# Patient Record
Sex: FEMALE | Race: WHITE | NOT HISPANIC OR LATINO | Employment: FULL TIME | URBAN - METROPOLITAN AREA
[De-identification: names, ages, dates, MRNs, and addresses within clinical notes are randomized per-mention and may not be internally consistent; named-entity substitution may affect disease eponyms.]

---

## 2017-01-30 RX ORDER — NORTRIPTYLINE HYDROCHLORIDE 10 MG/1
CAPSULE ORAL
Qty: 60 CAPSULE | Refills: 0 | Status: SHIPPED | OUTPATIENT
Start: 2017-01-30 | End: 2017-03-28 | Stop reason: SDUPTHER

## 2017-02-01 RX ORDER — CHLORTHALIDONE 25 MG/1
TABLET ORAL
Qty: 15 TABLET | Refills: 5 | Status: SHIPPED | OUTPATIENT
Start: 2017-02-01 | End: 2018-01-30 | Stop reason: SDUPTHER

## 2017-03-28 RX ORDER — NORTRIPTYLINE HYDROCHLORIDE 10 MG/1
CAPSULE ORAL
Qty: 60 CAPSULE | Refills: 0 | Status: SHIPPED | OUTPATIENT
Start: 2017-03-28 | End: 2017-05-30 | Stop reason: SDUPTHER

## 2017-05-30 RX ORDER — NORTRIPTYLINE HYDROCHLORIDE 10 MG/1
CAPSULE ORAL
Qty: 60 CAPSULE | Refills: 0 | Status: SHIPPED | OUTPATIENT
Start: 2017-05-30 | End: 2017-07-25 | Stop reason: SDUPTHER

## 2017-07-25 RX ORDER — NORTRIPTYLINE HYDROCHLORIDE 10 MG/1
CAPSULE ORAL
Qty: 60 CAPSULE | Refills: 0 | Status: SHIPPED | OUTPATIENT
Start: 2017-07-25 | End: 2017-09-12 | Stop reason: SDUPTHER

## 2017-09-12 RX ORDER — NORTRIPTYLINE HYDROCHLORIDE 10 MG/1
CAPSULE ORAL
Qty: 60 CAPSULE | Refills: 0 | Status: SHIPPED | OUTPATIENT
Start: 2017-09-12 | End: 2017-11-16 | Stop reason: SDUPTHER

## 2017-11-16 RX ORDER — NORTRIPTYLINE HYDROCHLORIDE 10 MG/1
CAPSULE ORAL
Qty: 60 CAPSULE | Refills: 0 | Status: SHIPPED | OUTPATIENT
Start: 2017-11-16 | End: 2018-01-04 | Stop reason: SDUPTHER

## 2017-11-28 ENCOUNTER — OFFICE VISIT (OUTPATIENT)
Dept: CARDIOLOGY | Facility: CLINIC | Age: 57
End: 2017-11-28

## 2017-11-28 VITALS
HEART RATE: 97 BPM | DIASTOLIC BLOOD PRESSURE: 78 MMHG | BODY MASS INDEX: 33.36 KG/M2 | SYSTOLIC BLOOD PRESSURE: 118 MMHG | WEIGHT: 200.2 LBS | HEIGHT: 65 IN

## 2017-11-28 DIAGNOSIS — E78.00 HYPERCHOLESTEROLEMIA: ICD-10-CM

## 2017-11-28 DIAGNOSIS — I10 ESSENTIAL HYPERTENSION: ICD-10-CM

## 2017-11-28 DIAGNOSIS — I25.10 CORONARY ARTERY DISEASE INVOLVING NATIVE CORONARY ARTERY OF NATIVE HEART WITHOUT ANGINA PECTORIS: Primary | ICD-10-CM

## 2017-11-28 PROCEDURE — 99214 OFFICE O/P EST MOD 30 MIN: CPT | Performed by: INTERNAL MEDICINE

## 2017-11-28 NOTE — PROGRESS NOTES
[facility] at [facility]  Office visit  Bri Boyce  1960  [phone]    VISIT DATE:  11/28/2017    PCP: Clarke Barragan MD  [address] [place] [address] [address] [address]    CC:  Chief Complaint   Patient presents with   • Coronary Artery Disease   • Hypertension       PROBLEM LIST:  1. Non-obstructive CAD  a. Left  heart catheterization, October 2011, 40% blockage off the circumflex  and 30% blockage of the LAD.  40% blockage of the RCA.  1. Echocardiogram, 04/09/2015: EF 55% to 60% with no regional wall motion abnormalities.  2. Exercise Cardiolite, 04/09/2015: Normal exercise Cardiolite with decreased exercise capacity.  2. Hypertension  3.  Hypercholesterolemia  4. Chest pain  5. Abnormal heart rhythm  6. Arthritis  7. Past surgeries:   a.  Cyst removed from ovary.  b. Cyst removed from right breast.  c. Tubal ligation  ASSESSMENT:   Diagnosis Plan   1. Coronary artery disease involving native coronary artery of native heart without angina pectoris     2. Essential hypertension     3. Hypercholesterolemia         PLAN:  Coronary artery disease, nonobstructive: Continue aspirin, statin and afterload reduction. encouraged regular exercise.    Hyperlipidemia: Goal LDL less than 100.  Continue atorvastatin 80 mg by mouth daily.  Have requested most recent fasting lipid panel.  Recommended biannual lipid panel and CMP.    Hypertension: Goal less than 130/80 mmHg.  Currently well-controlled.  Continue current medical therapy, ACE inhibitor and thiazide diuretic.    Subjective  Reports stable functional capacity.  Maintaining an active lifestyle but not exercising on a regular basis.  Blood pressures running less than 120/80 mmHg.  Denies myalgias on statin.  She is compliant with medical therapy.  She stopped smoking 7 years ago at the time of her heart catheterization.  Denies chest pain, palpitations or dyspnea.    PHYSICAL EXAMINATION:  Vitals:    11/28/17 1534   BP: 118/78   BP Location: Left arm  "  Patient Position: Sitting   Pulse: 97   Weight: 200 lb 3.2 oz (90.8 kg)   Height: 65\" (165.1 cm)     General Appearance:    Alert, cooperative, no distress, appears stated age   Head:    Normocephalic, without obvious abnormality, atraumatic   Eyes:    conjunctiva/corneas clear   Nose:   Nares normal, septum midline, mucosa normal, no drainage   Throat:   Lips, teeth and gums normal   Neck:   Supple, symmetrical, trachea midline, no carotid    bruit or JVD   Lungs:     Clear to auscultation bilaterally, respirations unlabored   Chest Wall:    No tenderness or deformity    Heart:    Regular rate and rhythm, S1 and S2 normal, no murmur, rub   or gallop, normal carotid impulse bilaterally without bruit.   Abdomen:     Soft, non-tender   Extremities:   Extremities normal, atraumatic, no cyanosis or edema   Pulses:   2+ and symmetric all extremities   Skin:   Skin color, texture, turgor normal, no rashes or lesions       Diagnostic Data:  Procedures  No results found for: CHLPL, TRIG, HDL, LDLDIRECT  No results found for: GLUCOSE, BUN, CREATININE, NA, K, CL, CO2, CREATININE, BUN  No results found for: HGBA1C  No results found for: WBC, HGB, HCT, PLT    Allergies  Allergies   Allergen Reactions   • Percocet [Oxycodone-Acetaminophen] Nausea And Vomiting       Current Medications    Current Outpatient Prescriptions:   •  aspirin 81 MG tablet, Take  by mouth Daily., Disp: , Rfl:   •  atorvastatin (LIPITOR) 80 MG tablet, Take 80 mg by mouth Daily., Disp: , Rfl:   •  chlorthalidone (HYGROTON) 25 MG tablet, take 1/2 tablet by mouth once daily, Disp: 15 tablet, Rfl: 5  •  Diphenhydramine-APAP, sleep, (TYLENOL PM EXTRA STRENGTH PO), Take  by mouth As Needed., Disp: , Rfl:   •  estradiol (ESTRACE) 0.5 MG tablet, Take  by mouth Daily., Disp: , Rfl:   •  lisinopril (PRINIVIL,ZESTRIL) 10 MG tablet, Take  by mouth Daily., Disp: , Rfl:   •  medroxyPROGESTERone (PROVERA) 2.5 MG tablet, Take  by mouth Daily., Disp: , Rfl:   •  " nortriptyline (PAMELOR) 10 MG capsule, take 1 to 2 capsules by mouth at bedtime, Disp: 60 capsule, Rfl: 0          ROS  Review of Systems   Cardiovascular: Negative for chest pain, claudication, dyspnea on exertion, irregular heartbeat, palpitations and syncope.   Respiratory: Negative for cough and wheezing.          SOCIAL HX  Social History     Social History   • Marital status:      Spouse name: N/A   • Number of children: N/A   • Years of education: N/A     Occupational History   • Not on file.     Social History Main Topics   • Smoking status: Former Smoker   • Smokeless tobacco: Never Used      Comment: QUIT 4 YEARS AGO   • Alcohol use No   • Drug use: No   • Sexual activity: Defer     Other Topics Concern   • Not on file     Social History Narrative       FAMILY HX  Family History   Problem Relation Age of Onset   • Other Other      BLOOD CLOTS   • Cancer Other    • Heart disease Other    • Diabetes Other    • Arthritis Other    • Stroke Mother    • Heart disease Father    • No Known Problems Sister    • No Known Problems Brother              Sven Escamilla III, MD, FACC

## 2018-01-04 RX ORDER — NORTRIPTYLINE HYDROCHLORIDE 10 MG/1
CAPSULE ORAL
Qty: 60 CAPSULE | Refills: 0 | Status: SHIPPED | OUTPATIENT
Start: 2018-01-04 | End: 2018-03-09 | Stop reason: SDUPTHER

## 2018-01-30 RX ORDER — CHLORTHALIDONE 25 MG/1
TABLET ORAL
Qty: 15 TABLET | Refills: 11 | Status: SHIPPED | OUTPATIENT
Start: 2018-01-30 | End: 2018-06-26 | Stop reason: SDUPTHER

## 2018-03-09 RX ORDER — NORTRIPTYLINE HYDROCHLORIDE 10 MG/1
CAPSULE ORAL
Qty: 60 CAPSULE | Refills: 5 | Status: SHIPPED | OUTPATIENT
Start: 2018-03-09 | End: 2019-07-23

## 2018-06-26 ENCOUNTER — OFFICE VISIT (OUTPATIENT)
Dept: CARDIOLOGY | Facility: CLINIC | Age: 58
End: 2018-06-26

## 2018-06-26 VITALS
HEART RATE: 97 BPM | DIASTOLIC BLOOD PRESSURE: 74 MMHG | HEIGHT: 65 IN | BODY MASS INDEX: 33.15 KG/M2 | SYSTOLIC BLOOD PRESSURE: 116 MMHG | WEIGHT: 199 LBS | OXYGEN SATURATION: 96 %

## 2018-06-26 DIAGNOSIS — I25.10 CORONARY ARTERY DISEASE INVOLVING NATIVE CORONARY ARTERY OF NATIVE HEART WITHOUT ANGINA PECTORIS: Primary | ICD-10-CM

## 2018-06-26 DIAGNOSIS — E78.00 HYPERCHOLESTEROLEMIA: ICD-10-CM

## 2018-06-26 DIAGNOSIS — I10 ESSENTIAL HYPERTENSION: ICD-10-CM

## 2018-06-26 PROCEDURE — 99214 OFFICE O/P EST MOD 30 MIN: CPT | Performed by: INTERNAL MEDICINE

## 2018-06-26 RX ORDER — CHLORTHALIDONE 25 MG/1
12.5 TABLET ORAL DAILY
Qty: 45 TABLET | Refills: 3 | Status: SHIPPED | OUTPATIENT
Start: 2018-06-26 | End: 2019-07-23 | Stop reason: SDUPTHER

## 2018-06-26 NOTE — PROGRESS NOTES
[facility] at [facility]  Office visit  Bri Boyce  1960  [phone]    VISIT DATE:  11/28/2017    PCP: Clarke Barragan MD  [address] [place] [address] [address] [place] [address]    CC:  Chief Complaint   Patient presents with   • Coronary Artery Disease   • Dizziness       PROBLEM LIST:  1. Non-obstructive CAD  a. Left  heart catheterization, October 2011, 40% blockage off the circumflex  and 30% blockage of the LAD.  40% blockage of the RCA.  1. Echocardiogram, 04/09/2015: EF 55% to 60% with no regional wall motion abnormalities.  2. Exercise Cardiolite, 04/09/2015: Normal exercise Cardiolite with decreased exercise capacity.  2. Hypertension  3.  Hypercholesterolemia  4. Chest pain  5. Abnormal heart rhythm  6. Arthritis  7. Past surgeries:   a.  Cyst removed from ovary.  b. Cyst removed from right breast.  c. Tubal ligation    ASSESSMENT:   Diagnosis Plan   1. Coronary artery disease involving native coronary artery of native heart without angina pectoris     2. Essential hypertension     3. Hypercholesterolemia         PLAN:  Coronary artery disease, nonobstructive: Continue aspirin, statin and afterload reduction.     Hyperlipidemia: Goal LDL less than 100.  Continue atorvastatin 80 mg by mouth daily. Recommended biannual lipid panel and CMP.Triglyceride levels and LDL are well controlled.  Mildly depressed HDL levels.  Continue regular exercise and dietary modification.    Hypertension: Goal less than 130/80 mmHg.  Currently well-controlled.  Continue current medical therapy, ACE inhibitor and thiazide diuretic.    Subjective  Reports stable functional capacity.  Maintaining an active lifestyle but not exercising on a regular basis.Has been able lose 12 pounds with regular exercise and dietary changes.  Blood pressures running less than 120/80 mmHg.  Denies myalgias on statin.  She is compliant with medical therapy.  She stopped smoking at the time of her heart catheterization.  Denies chest pain,  "palpitations or dyspnea.  Reviewed most recent fasting lipid panel.    PHYSICAL EXAMINATION:  Vitals:    06/26/18 1529   Weight: 90.3 kg (199 lb)   Height: 165.1 cm (65\")     General Appearance:    Alert, cooperative, no distress, appears stated age   Head:    Normocephalic, without obvious abnormality, atraumatic   Eyes:    conjunctiva/corneas clear   Nose:   Nares normal, septum midline, mucosa normal, no drainage   Throat:   Lips, teeth and gums normal   Neck:   Supple, symmetrical, trachea midline, no carotid    bruit or JVD   Lungs:     Clear to auscultation bilaterally, respirations unlabored   Chest Wall:    No tenderness or deformity    Heart:    Regular rate and rhythm, S1 and S2 normal, no murmur, rub   or gallop, normal carotid impulse bilaterally without bruit.   Abdomen:     Soft, non-tender   Extremities:   Extremities normal, atraumatic, no cyanosis or edema   Pulses:   2+ and symmetric all extremities   Skin:   Skin color, texture, turgor normal, no rashes or lesions       Diagnostic Data:  Procedures  No results found for: CHLPL, TRIG, HDL, LDLDIRECT  No results found for: GLUCOSE, BUN, CREATININE, NA, K, CL, CO2, CREATININE, BUN  No results found for: HGBA1C  No results found for: WBC, HGB, HCT, PLT    Allergies  Allergies   Allergen Reactions   • Percocet [Oxycodone-Acetaminophen] Nausea And Vomiting       Current Medications    Current Outpatient Prescriptions:   •  aspirin 81 MG tablet, Take  by mouth Daily., Disp: , Rfl:   •  atorvastatin (LIPITOR) 80 MG tablet, Take 80 mg by mouth Daily., Disp: , Rfl:   •  chlorthalidone (HYGROTON) 25 MG tablet, take 1/2 tablet by mouth once daily, Disp: 15 tablet, Rfl: 11  •  Diphenhydramine-APAP, sleep, (TYLENOL PM EXTRA STRENGTH PO), Take  by mouth As Needed., Disp: , Rfl:   •  estradiol (ESTRACE) 0.5 MG tablet, Take  by mouth Daily. Every other day, Disp: , Rfl:   •  lisinopril (PRINIVIL,ZESTRIL) 10 MG tablet, Take  by mouth Daily., Disp: , Rfl:   •  " medroxyPROGESTERone (PROVERA) 2.5 MG tablet, Take  by mouth Daily. Every other day, Disp: , Rfl:   •  Multiple Vitamin (MULTI-VITAMIN DAILY PO), Take  by mouth Daily. Smarty pants 6 gums, Disp: , Rfl:   •  nortriptyline (PAMELOR) 10 MG capsule, Take 1 to 2 capsules at bedtime, Disp: 60 capsule, Rfl: 5          ROS  Review of Systems   Cardiovascular: Negative for chest pain, claudication, dyspnea on exertion, irregular heartbeat, palpitations and syncope.   Respiratory: Positive for shortness of breath. Negative for cough and wheezing.          SOCIAL HX  Social History     Social History   • Marital status:      Spouse name: N/A   • Number of children: N/A   • Years of education: N/A     Occupational History   • Not on file.     Social History Main Topics   • Smoking status: Former Smoker     Types: Cigarettes     Quit date: 2011   • Smokeless tobacco: Never Used   • Alcohol use No   • Drug use: No   • Sexual activity: Defer     Other Topics Concern   • Not on file     Social History Narrative   • No narrative on file       FAMILY HX  Family History   Problem Relation Age of Onset   • Other Other         BLOOD CLOTS   • Cancer Other    • Heart disease Other    • Diabetes Other    • Arthritis Other    • Stroke Mother    • Heart disease Father    • No Known Problems Sister    • No Known Problems Brother              Sven Escamilla III, MD, [facility]

## 2019-02-25 RX ORDER — NORTRIPTYLINE HYDROCHLORIDE 10 MG/1
CAPSULE ORAL
Qty: 60 CAPSULE | Refills: 5 | OUTPATIENT
Start: 2019-02-25

## 2019-02-28 RX ORDER — NORTRIPTYLINE HYDROCHLORIDE 10 MG/1
CAPSULE ORAL
Qty: 60 CAPSULE | Refills: 5 | OUTPATIENT
Start: 2019-02-28

## 2019-06-26 RX ORDER — CHLORTHALIDONE 25 MG/1
TABLET ORAL
Qty: 15 TABLET | Refills: 1 | Status: SHIPPED | OUTPATIENT
Start: 2019-06-26 | End: 2019-07-23 | Stop reason: SDUPTHER

## 2019-07-23 ENCOUNTER — OFFICE VISIT (OUTPATIENT)
Dept: CARDIOLOGY | Facility: CLINIC | Age: 59
End: 2019-07-23

## 2019-07-23 VITALS
WEIGHT: 210 LBS | SYSTOLIC BLOOD PRESSURE: 136 MMHG | HEART RATE: 86 BPM | HEIGHT: 65 IN | OXYGEN SATURATION: 98 % | BODY MASS INDEX: 34.99 KG/M2 | DIASTOLIC BLOOD PRESSURE: 72 MMHG

## 2019-07-23 DIAGNOSIS — E78.00 HYPERCHOLESTEROLEMIA: ICD-10-CM

## 2019-07-23 DIAGNOSIS — I25.10 CORONARY ARTERY DISEASE INVOLVING NATIVE CORONARY ARTERY OF NATIVE HEART WITHOUT ANGINA PECTORIS: Primary | ICD-10-CM

## 2019-07-23 DIAGNOSIS — I10 ESSENTIAL HYPERTENSION: ICD-10-CM

## 2019-07-23 PROCEDURE — 99213 OFFICE O/P EST LOW 20 MIN: CPT | Performed by: INTERNAL MEDICINE

## 2019-07-23 RX ORDER — CHLORTHALIDONE 25 MG/1
12.5 TABLET ORAL DAILY
Qty: 45 TABLET | Refills: 3 | Status: SHIPPED | OUTPATIENT
Start: 2019-07-23 | End: 2020-03-11 | Stop reason: SDUPTHER

## 2019-07-23 NOTE — PROGRESS NOTES
[facility] at [facility]  Office visit  Bri Boyce  1960  [phone]    VISIT DATE:  7/23/2019      PCP: Clarke Barragan MD  [address] [place] [address] [address] [place] [address]    CC:  Chief Complaint   Patient presents with   • Coronary Artery Disease       PROBLEM LIST:  1. Non-obstructive CAD  a. Left  heart catheterization, October 2011, 40% blockage off the circumflex  and 30% blockage of the LAD.  40% blockage of the RCA.  1. Echocardiogram, 04/09/2015: EF 55% to 60% with no regional wall motion abnormalities.  2. Exercise Cardiolite, 04/09/2015: Normal exercise Cardiolite with decreased exercise capacity.  2. Hypertension  3.  Hypercholesterolemia  4. Chest pain  5. Abnormal heart rhythm  6. Arthritis  7. Past surgeries:   a.  Cyst removed from ovary.  b. Cyst removed from right breast.  c. Tubal ligation    ASSESSMENT:   Diagnosis Plan   1. Coronary artery disease involving native coronary artery of native heart without angina pectoris     2. Essential hypertension     3. Hypercholesterolemia         PLAN:  Coronary artery disease, nonobstructive: Continue aspirin, statin and afterload reduction.     Hyperlipidemia: Goal LDL less than 100.  Continue atorvastatin 80 mg by mouth daily. annual lipid panel and CMP.  LDL and HDL at goal.  Mild increase in triglycerides over the past year associated with about a 10 pound weight gain.  Continue regular exercise and dietary modification.    Hypertension: Goal less than 130/80 mmHg.  Currently well-controlled.  Continue current medical therapy, ACE inhibitor and thiazide diuretic.    Subjective  Reports stable functional capacity.  Maintaining an active lifestyle but not exercising on a regular basis.is gained about 10 pounds over the previous year. Denies myalgias on statin.  She is compliant with medical therapy.  She stopped smoking at the time of her heart catheterization.  Denies chest pain, palpitations or dyspnea.  Reviewed most recent fasting  "lipid panel.    PHYSICAL EXAMINATION:  Vitals:    07/23/19 1510   Weight: 95.3 kg (210 lb)   Height: 165.1 cm (65\")     General Appearance:    Alert, cooperative, no distress, appears stated age   Head:    Normocephalic, without obvious abnormality, atraumatic   Eyes:    conjunctiva/corneas clear   Nose:   Nares normal, septum midline, mucosa normal, no drainage   Throat:   Lips, teeth and gums normal   Neck:   Supple, symmetrical, trachea midline, no carotid    bruit or JVD   Lungs:     Clear to auscultation bilaterally, respirations unlabored   Chest Wall:    No tenderness or deformity    Heart:    Regular rate and rhythm, S1 and S2 normal, no murmur, rub   or gallop, normal carotid impulse bilaterally without bruit.   Abdomen:     Soft, non-tender   Extremities:   Extremities normal, atraumatic, no cyanosis or edema   Pulses:   2+ and symmetric all extremities   Skin:   Skin color, texture, turgor normal, no rashes or lesions       Diagnostic Data:  Procedures  No results found for: CHLPL, TRIG, HDL, LDLDIRECT  No results found for: GLUCOSE, BUN, CREATININE, NA, K, CL, CO2, CREATININE, BUN  No results found for: HGBA1C  No results found for: WBC, HGB, HCT, PLT    Allergies  Allergies   Allergen Reactions   • Percocet [Oxycodone-Acetaminophen] Nausea And Vomiting       Current Medications    Current Outpatient Medications:   •  aspirin 81 MG tablet, Take  by mouth Daily., Disp: , Rfl:   •  atorvastatin (LIPITOR) 80 MG tablet, Take 80 mg by mouth Daily., Disp: , Rfl:   •  chlorthalidone (HYGROTON) 25 MG tablet, Take 0.5 tablets by mouth Daily., Disp: 45 tablet, Rfl: 3  •  Diphenhydramine-APAP, sleep, (TYLENOL PM EXTRA STRENGTH PO), Take  by mouth As Needed., Disp: , Rfl:   •  estradiol (ESTRACE) 0.5 MG tablet, Take  by mouth Daily. Every other day, Disp: , Rfl:   •  lisinopril (PRINIVIL,ZESTRIL) 10 MG tablet, Take  by mouth Daily., Disp: , Rfl:   •  medroxyPROGESTERone (PROVERA) 2.5 MG tablet, Take  by mouth Daily. " Every other day, Disp: , Rfl:   •  Multiple Vitamin (MULTI-VITAMIN DAILY PO), Take  by mouth Daily. Smarty pants 6 gums, Disp: , Rfl:   •  nortriptyline (PAMELOR) 10 MG capsule, Take 1 to 2 capsules at bedtime, Disp: 60 capsule, Rfl: 5          ROS  Review of Systems   Cardiovascular: Negative for chest pain, claudication, dyspnea on exertion, irregular heartbeat, palpitations and syncope.   Respiratory: Negative for cough, shortness of breath and wheezing.          SOCIAL HX  Social History     Socioeconomic History   • Marital status:      Spouse name: Not on file   • Number of children: Not on file   • Years of education: Not on file   • Highest education level: Not on file   Tobacco Use   • Smoking status: Former Smoker     Types: Cigarettes     Last attempt to quit:      Years since quittin.5   • Smokeless tobacco: Never Used   Substance and Sexual Activity   • Alcohol use: No   • Drug use: No   • Sexual activity: Defer       FAMILY HX  Family History   Problem Relation Age of Onset   • Other Other         BLOOD CLOTS   • Cancer Other    • Heart disease Other    • Diabetes Other    • Arthritis Other    • Stroke Mother    • Heart disease Father    • No Known Problems Sister    • No Known Problems Brother              Sven Escamilla III, MD, FACC

## 2020-01-03 ENCOUNTER — TELEPHONE (OUTPATIENT)
Dept: CARDIOLOGY | Facility: CLINIC | Age: 60
End: 2020-01-03

## 2020-01-03 NOTE — TELEPHONE ENCOUNTER
Was in [facility] for 2 days last week with Pneumonia. B/P elevated and b/p medication changed from Lisinopril 10 mg to 20 mg daily and Isosorbide mononitrate 60 mg daily. B/P elevated this morning. Asked for a list of b/p reading. No reading recorded. Instructed her to keep a b/p log for the next week and to call us back with the readings. Verbalized understanding. Requested records from [facility].

## 2020-01-10 ENCOUNTER — OFFICE VISIT (OUTPATIENT)
Dept: NEUROSURGERY | Facility: CLINIC | Age: 60
End: 2020-01-10

## 2020-01-10 VITALS — WEIGHT: 207 LBS | BODY MASS INDEX: 34.49 KG/M2 | HEIGHT: 65 IN | TEMPERATURE: 98.6 F

## 2020-01-10 DIAGNOSIS — M50.30 DEGENERATIVE DISC DISEASE, CERVICAL: ICD-10-CM

## 2020-01-10 DIAGNOSIS — M54.2 NECK PAIN: Primary | ICD-10-CM

## 2020-01-10 DIAGNOSIS — M47.812 CERVICAL SPONDYLOSIS WITHOUT MYELOPATHY: ICD-10-CM

## 2020-01-10 PROCEDURE — 99243 OFF/OP CNSLTJ NEW/EST LOW 30: CPT | Performed by: NEUROLOGICAL SURGERY

## 2020-01-10 RX ORDER — ISOSORBIDE MONONITRATE 60 MG/1
60 TABLET, EXTENDED RELEASE ORAL DAILY
COMMUNITY
Start: 2019-12-20 | End: 2022-08-28 | Stop reason: HOSPADM

## 2020-01-10 RX ORDER — ALBUTEROL SULFATE 2.5 MG/3ML
SOLUTION RESPIRATORY (INHALATION)
COMMUNITY
Start: 2019-12-17 | End: 2020-08-04

## 2020-01-10 RX ORDER — CYCLOBENZAPRINE HCL 10 MG
10 TABLET ORAL AS NEEDED
COMMUNITY
End: 2022-06-20

## 2020-01-10 RX ORDER — DILTIAZEM HYDROCHLORIDE 60 MG/1
TABLET, FILM COATED ORAL
COMMUNITY
Start: 2019-12-20 | End: 2020-08-04

## 2020-01-10 NOTE — PROGRESS NOTES
Patient: Bri Boyce  : 1960    Primary Care Provider: Clarke Barragan MD    Requesting Provider: As above        History    Chief Complaint: Neck pain.    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  I saw her in 2015 with similar complaints.  At that time she had undergone therapy, chiropractic, acupuncture, and massage therapy.  She was noted to have mechanical neck pain and was referred to Dr. Whitten.  She ultimately had rhizotomies done and did wonderfully.  Since about mid  her symptoms have started to creep back on her.  In mid December she was afflicted with pneumonia and tightened up and that seemed to put more stress on her neck and her symptoms have been worse.  She has some trapezius region pain.  She has no radicular pain.  She denies numbness or weakness.  She has no bowel or bladder dysfunction.  She is worse with protracted sitting or sitting in a wrong position.  Massage is somewhat helpful.  She has been taking Flexeril.    Review of Systems   Constitutional: Negative for activity change, appetite change, chills, diaphoresis, fatigue, fever and unexpected weight change.   HENT: Negative for congestion, dental problem, drooling, ear discharge, ear pain, facial swelling, hearing loss, mouth sores, nosebleeds, postnasal drip, rhinorrhea, sinus pressure, sneezing, sore throat, tinnitus, trouble swallowing and voice change.    Eyes: Negative for photophobia, pain, discharge, redness, itching and visual disturbance.   Respiratory: Negative for apnea, cough, choking, chest tightness, shortness of breath, wheezing and stridor.    Cardiovascular: Negative for chest pain, palpitations and leg swelling.   Gastrointestinal: Negative for abdominal distention, abdominal pain, anal bleeding, blood in stool, constipation, diarrhea, nausea, rectal pain and vomiting.   Endocrine: Negative for cold intolerance, heat  "intolerance, polydipsia, polyphagia and polyuria.   Genitourinary: Negative for decreased urine volume, difficulty urinating, dysuria, enuresis, flank pain, frequency, genital sores, hematuria and urgency.   Musculoskeletal: Positive for myalgias, neck pain and neck stiffness. Negative for arthralgias, back pain, gait problem and joint swelling.   Skin: Negative for color change, pallor, rash and wound.   Allergic/Immunologic: Negative for environmental allergies, food allergies and immunocompromised state.   Neurological: Negative for dizziness, tremors, seizures, syncope, facial asymmetry, speech difficulty, weakness, light-headedness, numbness and headaches.   Hematological: Negative for adenopathy. Does not bruise/bleed easily.   Psychiatric/Behavioral: Negative for agitation, behavioral problems, confusion, decreased concentration, dysphoric mood, hallucinations, self-injury, sleep disturbance and suicidal ideas. The patient is not nervous/anxious and is not hyperactive.    All other systems reviewed and are negative.      The patient's past medical history, past surgical history, family history, and social history have been reviewed at length in the electronic medical record.    Physical Exam:   Temp 98.6 °F (37 °C)   Ht 165.1 cm (65\")   Wt 93.9 kg (207 lb)   BMI 34.45 kg/m²   CONSTITUTIONAL: Patient is well-nourished, pleasant and appears stated age.  CV: Heart regular rate and rhythm without murmur, rub, or gallop.  PULMONARY: Lungs are clear to ascultation.  MUSCULOSKELETAL:  Neck tenderness to palpation is not observed.   ROM in neck is normal.  NEUROLOGICAL:  Orientation, memory, attention span, language function, and cognition have been examined and are intact.  Strength is intact in the upper and lower extremities to direct testing.  Muscle tone is normal throughout.  Station and gait are normal.  Sensation is intact to light touch testing throughout.  Deep tendon reflexes are 1+ and symmetrical.  " Jim's Sign is negative bilaterally. No clonus is elicited.  Coordination is intact.      Medical Decision Making    Data Review:   MRI of the cervical spine dated 12/30/2019 demonstrates some diffuse degenerative disc disease and spondylosis with loss of the normal lordosis.  There is some modest central disc bulging without significant cord or root compromise.    Diagnosis:   The patient harbors mechanical neck pain without radiculopathy or myelopathy.    Treatment Options:   I am going to refer back to Dr. Whitten for some additional blocks.  She will follow-up on an as-needed basis.       Diagnosis Plan   1. Neck pain  Ambulatory Referral to Pain Management   2. Cervical spondylosis without myelopathy     3. Degenerative disc disease, cervical         Scribed for Tay Ayala MD by Jeannine Brooks CMA on 01/10/2020 at 4:36 PM      I, Dr. Ayala, personally performed the services described in the documentation, as scribed in my presence, and it is both accurate and complete.

## 2020-01-27 ENCOUNTER — TELEPHONE (OUTPATIENT)
Dept: PAIN MEDICINE | Facility: CLINIC | Age: 60
End: 2020-01-27

## 2020-01-29 PROBLEM — R53.81 PHYSICAL DECONDITIONING: Status: ACTIVE | Noted: 2020-01-29

## 2020-01-29 PROBLEM — M47.816 SPONDYLOSIS OF LUMBAR REGION WITHOUT MYELOPATHY OR RADICULOPATHY: Status: ACTIVE | Noted: 2020-01-29

## 2020-01-29 PROBLEM — M50.30 DDD (DEGENERATIVE DISC DISEASE), CERVICAL: Status: ACTIVE | Noted: 2020-01-29

## 2020-01-29 PROBLEM — M47.812 CERVICAL SPONDYLOSIS WITHOUT MYELOPATHY: Status: ACTIVE | Noted: 2020-01-29

## 2020-01-29 PROBLEM — M54.81 OCCIPITAL NEURALGIA: Status: ACTIVE | Noted: 2020-01-29

## 2020-01-29 PROBLEM — M79.18 MYOFASCIAL PAIN: Status: ACTIVE | Noted: 2020-01-29

## 2020-01-29 PROBLEM — G58.9 NERVE ENTRAPMENT: Status: ACTIVE | Noted: 2020-01-29

## 2020-02-04 ENCOUNTER — OFFICE VISIT (OUTPATIENT)
Dept: PAIN MEDICINE | Facility: CLINIC | Age: 60
End: 2020-02-04

## 2020-02-04 VITALS
DIASTOLIC BLOOD PRESSURE: 86 MMHG | SYSTOLIC BLOOD PRESSURE: 132 MMHG | HEIGHT: 65 IN | WEIGHT: 210 LBS | BODY MASS INDEX: 34.99 KG/M2

## 2020-02-04 DIAGNOSIS — M54.81 OCCIPITAL NEURALGIA, UNSPECIFIED LATERALITY: ICD-10-CM

## 2020-02-04 DIAGNOSIS — G58.9 NERVE ENTRAPMENT: ICD-10-CM

## 2020-02-04 DIAGNOSIS — M79.18 MYOFASCIAL PAIN: ICD-10-CM

## 2020-02-04 DIAGNOSIS — M50.30 DDD (DEGENERATIVE DISC DISEASE), CERVICAL: ICD-10-CM

## 2020-02-04 DIAGNOSIS — M47.812 CERVICAL SPONDYLOSIS WITHOUT MYELOPATHY: ICD-10-CM

## 2020-02-04 DIAGNOSIS — M47.816 SPONDYLOSIS OF LUMBAR REGION WITHOUT MYELOPATHY OR RADICULOPATHY: ICD-10-CM

## 2020-02-04 DIAGNOSIS — M47.812 CERVICAL SPONDYLOSIS WITHOUT MYELOPATHY: Primary | ICD-10-CM

## 2020-02-04 PROCEDURE — 99204 OFFICE O/P NEW MOD 45 MIN: CPT | Performed by: ANESTHESIOLOGY

## 2020-02-04 RX ORDER — ME-TETRAHYDROFOLATE/B12/HRB236 1-1-500 MG
CAPSULE ORAL
Qty: 180 CAPSULE | Refills: 1 | Status: SHIPPED | OUTPATIENT
Start: 2020-02-04 | End: 2020-08-04

## 2020-02-26 ENCOUNTER — OUTSIDE FACILITY SERVICE (OUTPATIENT)
Dept: PAIN MEDICINE | Facility: CLINIC | Age: 60
End: 2020-02-26

## 2020-02-26 DIAGNOSIS — M47.812 CERVICAL SPONDYLOSIS WITHOUT MYELOPATHY: Primary | ICD-10-CM

## 2020-02-26 PROCEDURE — 64490 INJ PARAVERT F JNT C/T 1 LEV: CPT | Performed by: ANESTHESIOLOGY

## 2020-02-26 PROCEDURE — 99152 MOD SED SAME PHYS/QHP 5/>YRS: CPT | Performed by: ANESTHESIOLOGY

## 2020-02-26 PROCEDURE — 64491 INJ PARAVERT F JNT C/T 2 LEV: CPT | Performed by: ANESTHESIOLOGY

## 2020-02-27 ENCOUNTER — TELEPHONE (OUTPATIENT)
Dept: PAIN MEDICINE | Facility: CLINIC | Age: 60
End: 2020-02-27

## 2020-02-27 DIAGNOSIS — M79.18 MYOFASCIAL PAIN: ICD-10-CM

## 2020-02-27 DIAGNOSIS — M47.816 SPONDYLOSIS OF LUMBAR REGION WITHOUT MYELOPATHY OR RADICULOPATHY: ICD-10-CM

## 2020-02-27 DIAGNOSIS — R53.81 PHYSICAL DECONDITIONING: ICD-10-CM

## 2020-02-27 DIAGNOSIS — M54.81 OCCIPITAL NEURALGIA, UNSPECIFIED LATERALITY: ICD-10-CM

## 2020-02-27 DIAGNOSIS — M47.812 CERVICAL SPONDYLOSIS WITHOUT MYELOPATHY: Primary | ICD-10-CM

## 2020-02-27 DIAGNOSIS — M50.30 DDD (DEGENERATIVE DISC DISEASE), CERVICAL: ICD-10-CM

## 2020-02-27 DIAGNOSIS — G58.9 NERVE ENTRAPMENT: ICD-10-CM

## 2020-02-27 NOTE — TELEPHONE ENCOUNTER
ONE SET DX ÁLVARO CERVICAL MBB 02/26/2020.    Left a message, requested a return call. Advised about RFTC that is scheduled for 03/04.

## 2020-03-04 ENCOUNTER — OUTSIDE FACILITY SERVICE (OUTPATIENT)
Dept: PAIN MEDICINE | Facility: CLINIC | Age: 60
End: 2020-03-04

## 2020-03-04 PROCEDURE — 64634 DESTROY C/TH FACET JNT ADDL: CPT | Performed by: ANESTHESIOLOGY

## 2020-03-04 PROCEDURE — 99152 MOD SED SAME PHYS/QHP 5/>YRS: CPT | Performed by: ANESTHESIOLOGY

## 2020-03-04 PROCEDURE — 64633 DESTROY CERV/THOR FACET JNT: CPT | Performed by: ANESTHESIOLOGY

## 2020-03-05 ENCOUNTER — TELEPHONE (OUTPATIENT)
Dept: PAIN MEDICINE | Facility: CLINIC | Age: 60
End: 2020-03-05

## 2020-03-05 NOTE — TELEPHONE ENCOUNTER
ÁLVARO CERVICAL TC 03/04/2020.    Attempted to contact patient. No answer, the voicemail was the  stating his name.  I left a brief message regarding the reason of the call. Also informed of 20 week f/u scheduled on 07/22.

## 2020-03-11 ENCOUNTER — TELEPHONE (OUTPATIENT)
Dept: CARDIOLOGY | Facility: CLINIC | Age: 60
End: 2020-03-11

## 2020-03-11 DIAGNOSIS — I10 ESSENTIAL HYPERTENSION: Primary | ICD-10-CM

## 2020-03-11 RX ORDER — CHLORTHALIDONE 25 MG/1
12.5 TABLET ORAL DAILY
Qty: 15 TABLET | Refills: 0 | Status: SHIPPED | OUTPATIENT
Start: 2020-03-11 | End: 2020-08-07 | Stop reason: SDUPTHER

## 2020-03-11 NOTE — TELEPHONE ENCOUNTER
Patient notified of message above from . Verbalized understanding. Stated would go to her PCP to get lab work done to check her potassium.

## 2020-03-11 NOTE — TELEPHONE ENCOUNTER
Left voice message requesting refill on her chlorthalidone 12.5 mg daily. Last lab work  was from [facility] in Dec 2019 when she was hospitalized with Pneumonia. Her K was 3.3 . No recent lab work with her PCP. Do you want a 30 day supply sent with an order for  Bmp? Please advise.

## 2020-05-10 ENCOUNTER — RESULTS ENCOUNTER (OUTPATIENT)
Dept: CARDIOLOGY | Facility: CLINIC | Age: 60
End: 2020-05-10

## 2020-05-10 DIAGNOSIS — I10 ESSENTIAL HYPERTENSION: ICD-10-CM

## 2020-06-03 ENCOUNTER — TELEPHONE (OUTPATIENT)
Dept: CARDIOLOGY | Facility: CLINIC | Age: 60
End: 2020-06-03

## 2020-06-03 DIAGNOSIS — R06.02 SHORTNESS OF BREATH: ICD-10-CM

## 2020-06-03 DIAGNOSIS — R07.9 CHEST PAIN, UNSPECIFIED TYPE: Primary | ICD-10-CM

## 2020-06-03 DIAGNOSIS — I49.8 FLUTTERING HEART: ICD-10-CM

## 2020-06-03 NOTE — TELEPHONE ENCOUNTER
Back in December was hospitalized for pneumonia. Had PFT'S and her pulmonologist told her they were negative.Still having soa and heart rate increases to @ 150 whenever she does any activity. Walking in her driveway she has to stop and catch her breath. Also 2-3 times a week she feels a flutter/chest pressure that makes her cough.The only new medication that was added back in December is Isosorbide mononitrate 60 mg daily.She is requesting her appt be moved up. Do you want any testing ordered? Please advise.

## 2020-06-03 NOTE — TELEPHONE ENCOUNTER
Patient notified of message above from . Told her Dorys would call her to schedule the Echo and stress test and Sapna or Ana in the holter dept would call her about the Zio patch. She verbalized understanding.

## 2020-06-30 ENCOUNTER — HOSPITAL ENCOUNTER (OUTPATIENT)
Dept: CARDIOLOGY | Facility: HOSPITAL | Age: 60
Discharge: HOME | End: 2020-06-30
Admitting: INTERNAL MEDICINE

## 2020-06-30 ENCOUNTER — HOSPITAL ENCOUNTER (OUTPATIENT)
Dept: CARDIOLOGY | Facility: HOSPITAL | Age: 60
Discharge: HOME | End: 2020-06-30

## 2020-06-30 VITALS
HEART RATE: 82 BPM | SYSTOLIC BLOOD PRESSURE: 138 MMHG | HEIGHT: 65 IN | BODY MASS INDEX: 35.82 KG/M2 | DIASTOLIC BLOOD PRESSURE: 78 MMHG | WEIGHT: 215 LBS

## 2020-06-30 DIAGNOSIS — R07.9 CHEST PAIN, UNSPECIFIED TYPE: ICD-10-CM

## 2020-06-30 DIAGNOSIS — R06.02 SHORTNESS OF BREATH: ICD-10-CM

## 2020-06-30 LAB
BH CV ECHO MEAS - AO ROOT AREA (BSA CORRECTED): 1.3
BH CV ECHO MEAS - AO ROOT AREA: 5.3 CM^2
BH CV ECHO MEAS - AO ROOT DIAM: 2.6 CM
BH CV ECHO MEAS - BSA(HAYCOCK): 2.2 M^2
BH CV ECHO MEAS - BSA: 2 M^2
BH CV ECHO MEAS - BZI_BMI: 35.8 KILOGRAMS/M^2
BH CV ECHO MEAS - BZI_METRIC_HEIGHT: 165.1 CM
BH CV ECHO MEAS - BZI_METRIC_WEIGHT: 97.5 KG
BH CV ECHO MEAS - EDV(CUBED): 55.3 ML
BH CV ECHO MEAS - EDV(TEICH): 62.3 ML
BH CV ECHO MEAS - EF(CUBED): 59.5 %
BH CV ECHO MEAS - EF(TEICH): 51.8 %
BH CV ECHO MEAS - ESV(CUBED): 22.4 ML
BH CV ECHO MEAS - ESV(TEICH): 30.1 ML
BH CV ECHO MEAS - FS: 26 %
BH CV ECHO MEAS - IVS/LVPW: 1
BH CV ECHO MEAS - IVSD: 0.99 CM
BH CV ECHO MEAS - LA DIMENSION: 3 CM
BH CV ECHO MEAS - LA/AO: 1.2
BH CV ECHO MEAS - LAD MAJOR: 4.5 CM
BH CV ECHO MEAS - LAT PEAK E' VEL: 7.9 CM/SEC
BH CV ECHO MEAS - LATERAL E/E' RATIO: 11.2
BH CV ECHO MEAS - LV MASS(C)D: 112.4 GRAMS
BH CV ECHO MEAS - LV MASS(C)DI: 55.1 GRAMS/M^2
BH CV ECHO MEAS - LVIDD: 3.8 CM
BH CV ECHO MEAS - LVIDS: 2.8 CM
BH CV ECHO MEAS - LVOT AREA (M): 3.1 CM^2
BH CV ECHO MEAS - LVOT AREA: 3.1 CM^2
BH CV ECHO MEAS - LVOT DIAM: 2 CM
BH CV ECHO MEAS - LVPWD: 0.95 CM
BH CV ECHO MEAS - MED PEAK E' VEL: 7.7 CM/SEC
BH CV ECHO MEAS - MEDIAL E/E' RATIO: 11.4
BH CV ECHO MEAS - MV A MAX VEL: 86.4 CM/SEC
BH CV ECHO MEAS - MV DEC SLOPE: 408 CM/SEC^2
BH CV ECHO MEAS - MV DEC TIME: 0.2 SEC
BH CV ECHO MEAS - MV E MAX VEL: 88.2 CM/SEC
BH CV ECHO MEAS - MV E/A: 1
BH CV ECHO MEAS - MV P1/2T MAX VEL: 103 CM/SEC
BH CV ECHO MEAS - MV P1/2T: 73.9 MSEC
BH CV ECHO MEAS - MVA P1/2T LCG: 2.1 CM^2
BH CV ECHO MEAS - MVA(P1/2T): 3 CM^2
BH CV ECHO MEAS - PA ACC SLOPE: 635 CM/SEC^2
BH CV ECHO MEAS - PA ACC TIME: 0.13 SEC
BH CV ECHO MEAS - PA PR(ACCEL): 20.5 MMHG
BH CV ECHO MEAS - RV MAX PG: 2.7 MMHG
BH CV ECHO MEAS - RV V1 MAX: 82.1 CM/SEC
BH CV ECHO MEAS - SI(CUBED): 16.1 ML/M^2
BH CV ECHO MEAS - SI(TEICH): 15.8 ML/M^2
BH CV ECHO MEAS - SV(CUBED): 32.9 ML
BH CV ECHO MEAS - SV(TEICH): 32.3 ML
BH CV ECHO MEAS - TAPSE (>1.6): 2.2 CM2
BH CV ECHO MEASUREMENTS AVERAGE E/E' RATIO: 11.31
BH CV STRESS BP STAGE 1: NORMAL
BH CV STRESS DURATION MIN STAGE 1: 4
BH CV STRESS DURATION SEC STAGE 1: 0
BH CV STRESS GRADE STAGE 1: 10
BH CV STRESS HR STAGE 1: 153
BH CV STRESS METS STAGE 1: 5
BH CV STRESS O2 STAGE 1: 95
BH CV STRESS PROTOCOL 1: NORMAL
BH CV STRESS RECOVERY BP: NORMAL MMHG
BH CV STRESS RECOVERY HR: 94 BPM
BH CV STRESS SPEED STAGE 1: 1.7
BH CV STRESS STAGE 1: 1
BH CV XLRA - RV BASE: 2.7 CM
BH CV XLRA - RV LENGTH: 6 CM
BH CV XLRA - RV MID: 2.3 CM
BH CV XLRA - TDI S': 11.8 CM/SEC
LEFT ATRIUM VOLUME INDEX: 13.7 ML/M^2
LEFT ATRIUM VOLUME: 28 ML
LV EF NUC BP: 78 %
MAXIMAL PREDICTED HEART RATE: 162 BPM
MAXIMAL PREDICTED HEART RATE: 162 BPM
PERCENT MAX PREDICTED HR: 95.68 %
STRESS BASELINE BP: NORMAL MMHG
STRESS BASELINE HR: 86 BPM
STRESS PERCENT HR: 113 %
STRESS POST ESTIMATED WORKLOAD: 4.6 METS
STRESS POST EXERCISE DUR MIN: 4 MIN
STRESS POST EXERCISE DUR SEC: 0 SEC
STRESS POST O2 SAT PEAK: 95 %
STRESS POST PEAK BP: NORMAL MMHG
STRESS POST PEAK HR: 155 BPM
STRESS TARGET HR: 138 BPM
STRESS TARGET HR: 138 BPM

## 2020-06-30 PROCEDURE — 78452 HT MUSCLE IMAGE SPECT MULT: CPT | Performed by: INTERNAL MEDICINE

## 2020-06-30 PROCEDURE — 93017 CV STRESS TEST TRACING ONLY: CPT

## 2020-06-30 PROCEDURE — 0 TECHNETIUM SESTAMIBI: Performed by: INTERNAL MEDICINE

## 2020-06-30 PROCEDURE — 93306 TTE W/DOPPLER COMPLETE: CPT | Performed by: INTERNAL MEDICINE

## 2020-06-30 PROCEDURE — 93306 TTE W/DOPPLER COMPLETE: CPT

## 2020-06-30 PROCEDURE — 78452 HT MUSCLE IMAGE SPECT MULT: CPT

## 2020-06-30 PROCEDURE — 93018 CV STRESS TEST I&R ONLY: CPT | Performed by: INTERNAL MEDICINE

## 2020-06-30 PROCEDURE — A9500 TC99M SESTAMIBI: HCPCS | Performed by: INTERNAL MEDICINE

## 2020-06-30 RX ADMIN — TECHNETIUM TC 99M SESTAMIBI 1 DOSE: 1 INJECTION INTRAVENOUS at 13:35

## 2020-06-30 RX ADMIN — TECHNETIUM TC 99M SESTAMIBI 1 DOSE: 1 INJECTION INTRAVENOUS at 11:40

## 2020-07-01 ENCOUNTER — TELEPHONE (OUTPATIENT)
Dept: CARDIOLOGY | Facility: CLINIC | Age: 60
End: 2020-07-01

## 2020-07-01 NOTE — TELEPHONE ENCOUNTER
----- Message from Sven Escamilla III, MD sent at 7/1/2020  9:14 AM EDT -----  Strength of heart muscle is normal.  You are having some extra heartbeats coming from the top chambers of your heart which you are sensing every once in a while.  These are more annoying than anything else.  No evidence of blockage on your stress test.  The heart muscle is gotten a little stiff and does not relax very well in between each heartbeat which can cause some shortness of breath.  There is some mild calcium buildup in the arteries feeding blood supply to your heart.  We will discuss these issues in more detail at follow-up.

## 2020-08-04 ENCOUNTER — OFFICE VISIT (OUTPATIENT)
Dept: CARDIOLOGY | Facility: CLINIC | Age: 60
End: 2020-08-04

## 2020-08-04 VITALS
DIASTOLIC BLOOD PRESSURE: 72 MMHG | HEART RATE: 98 BPM | OXYGEN SATURATION: 99 % | BODY MASS INDEX: 36.55 KG/M2 | WEIGHT: 219.4 LBS | TEMPERATURE: 97.7 F | SYSTOLIC BLOOD PRESSURE: 126 MMHG | HEIGHT: 65 IN

## 2020-08-04 DIAGNOSIS — I10 ESSENTIAL HYPERTENSION: ICD-10-CM

## 2020-08-04 DIAGNOSIS — I25.10 CORONARY ARTERY DISEASE INVOLVING NATIVE CORONARY ARTERY OF NATIVE HEART WITHOUT ANGINA PECTORIS: Primary | ICD-10-CM

## 2020-08-04 DIAGNOSIS — E78.00 HYPERCHOLESTEROLEMIA: ICD-10-CM

## 2020-08-04 PROCEDURE — 99214 OFFICE O/P EST MOD 30 MIN: CPT | Performed by: INTERNAL MEDICINE

## 2020-08-04 RX ORDER — ALBUTEROL SULFATE 90 UG/1
2 AEROSOL, METERED RESPIRATORY (INHALATION) EVERY 4 HOURS PRN
COMMUNITY

## 2020-08-04 NOTE — PROGRESS NOTES
[facility]  Office visit  Bri Boyce  1960  [phone]    VISIT DATE:  8/4/2020      PCP: Clarke Barragan MD  [address] [place] [address] [address] [place] [address]    CC:  Chief Complaint   Patient presents with   • Shortness of Breath   • Coronary Artery Disease       PROBLEM LIST:  1. Non-obstructive CAD  a. Left  heart catheterization, October 2011, 40% blockage off the circumflex  and 30% blockage of the LAD.  40% blockage of the RCA.  1. Echocardiogram, 04/09/2015: EF 55% to 60% with no regional wall motion abnormalities.  2. Exercise Cardiolite, 04/09/2015: Normal exercise Cardiolite with decreased exercise capacity.  2. Hypertension  3.  Hypercholesterolemia  4. Chest pain  5. Abnormal heart rhythm  6. Arthritis  7. Past surgeries:   a.  Cyst removed from ovary.  b. Cyst removed from right breast.  c. Tubal ligation    Previous cardiac studies and procedures:  June 2020  2-week ambulatory ECG monitor  · A relatively benign monitor study.  · Shortness of air correlates with sinus rhythm. Palpitations correlate with PACs and short bursts of nonsustained atrial tachycardia.  Exercise myocardial perfusion imaging  · Mild mild calcifications visualized in the mid LAD and proximal circumflex.  · Pt denied any chest pain or discomfort, did report significant SOA (O2 sat 93-98%) during stress.  · Expected exercise time: 7:05, actual time: 4:00  · No ECG evidence of myocardial ischemia  · Left ventricular ejection fraction is hyperdynamic (Calculated EF > 70%).  · Myocardial perfusion imaging indicates a normal myocardial perfusion study with no evidence of ischemia.  · Impressions are consistent with a low risk study.  Echo  · Estimated EF appears to be in the range of 56 - 60%.  · Left ventricular diastolic dysfunction (grade II) consistent with pseudonormalization.    ASSESSMENT:   Diagnosis Plan   1. Coronary artery disease involving native coronary artery of native heart without angina  "pectoris     2. Essential hypertension     3. Hypercholesterolemia         PLAN:  Coronary artery disease, nonobstructive: Continue aspirin, statin and afterload reduction.  Reassuring perfusion imaging.    Hyperlipidemia: Goal LDL less than 100.  Continue atorvastatin 80 mg by mouth daily. annual lipid panel and CMP.  LDL and HDL at goal.  Mild increase in triglycerides over the past year associated with weight gain.  Encouraged regular exercise and dietary modification.    Hypertension: Goal less than 130/80 mmHg.  Currently well-controlled.  Continue current medical therapy, ACE inhibitor and thiazide diuretic.    Symptomatic PACs and PVCs: Offered reassurance.  Starting low-dose beta-blockade.  Trending clinical response.    Diastolic dysfunction, grade 2: Currently euvolemic.  Continue current afterload reduction and thiazide diuretic.  Adding low-dose beta-blockade.  Encouraged dietary modifications and regular exercise to achieve weight loss.    Subjective  Reports stable functional capacity.  Still with shortness of breath and a class II pattern.  Intermittent brief palpitations which usually occur at rest without any triggers.. Denies myalgias on statin.  She is compliant with medical therapy.  She stopped smoking at the time of her heart catheterization.  Denies chest pain.  Reviewed results of ambulatory ECG monitor, echo and stress test.  Blood pressures running less than 130/80 mmHg.  She feels heart racing heartbeat when she exerts herself.    PHYSICAL EXAMINATION:  Vitals:    08/04/20 0937   BP: 126/72   BP Location: Left arm   Patient Position: Sitting   Pulse: 98   Temp: 97.7 °F (36.5 °C)   SpO2: 99%   Weight: 99.5 kg (219 lb 6.4 oz)   Height: 165.1 cm (65\")     General Appearance:    Alert, cooperative, no distress, appears stated age   Head:    Normocephalic, without obvious abnormality, atraumatic   Eyes:    conjunctiva/corneas clear   Nose:   Nares normal, septum midline, mucosa normal, no " drainage   Throat:   Lips, teeth and gums normal   Neck:   Supple, symmetrical, trachea midline, no carotid    bruit or JVD   Lungs:     Clear to auscultation bilaterally, respirations unlabored   Chest Wall:    No tenderness or deformity    Heart:    Regular rate and rhythm, S1 and S2 normal, no murmur, rub   or gallop, normal carotid impulse bilaterally without bruit.   Abdomen:     Soft, non-tender   Extremities:   Extremities normal, atraumatic, no cyanosis.  Trivial pretibial edema   Pulses:   2+ and symmetric all extremities   Skin:   Skin color, texture, turgor normal, no rashes or lesions       Diagnostic Data:  Procedures  No results found for: CHLPL, TRIG, HDL, LDLDIRECT  No results found for: GLUCOSE, BUN, CREATININE, NA, K, CL, CO2, CREATININE, BUN  No results found for: HGBA1C  No results found for: WBC, HGB, HCT, PLT    Allergies  Allergies   Allergen Reactions   • Percocet [Oxycodone-Acetaminophen] Nausea And Vomiting       Current Medications    Current Outpatient Medications:   •  albuterol sulfate  (90 Base) MCG/ACT inhaler, Inhale 2 puffs Every 4 (Four) Hours As Needed for Wheezing., Disp: , Rfl:   •  aspirin 81 MG tablet, Take 81 mg by mouth Daily., Disp: , Rfl:   •  atorvastatin (LIPITOR) 80 MG tablet, Take 80 mg by mouth Daily., Disp: , Rfl:   •  chlorthalidone (HYGROTON) 25 MG tablet, Take 0.5 tablets by mouth Daily. Need lab work for further refills. Otherwise defer to PCP., Disp: 15 tablet, Rfl: 0  •  cyclobenzaprine (FLEXERIL) 10 MG tablet, cyclobenzaprine 10 mg tablet  prn pain, Disp: , Rfl:   •  Diphenhydramine-APAP, sleep, (TYLENOL PM EXTRA STRENGTH PO), Take 1 tablet by mouth As Needed., Disp: , Rfl:   •  estradiol (ESTRACE) 0.5 MG tablet, Take 0.5 mg by mouth Daily., Disp: , Rfl:   •  isosorbide mononitrate (IMDUR) 60 MG 24 hr tablet, Take 60 mg by mouth Daily., Disp: , Rfl:   •  lisinopril (PRINIVIL,ZESTRIL) 10 MG tablet, Take 10 mg by mouth Daily., Disp: , Rfl:   •   medroxyPROGESTERone (PROVERA) 2.5 MG tablet, Take 2.5 mg by mouth Daily., Disp: , Rfl:   •  WIXELA INHUB 250-50 MCG/DOSE DISKUS, USE 1 INHALATION PO BID, Disp: , Rfl:   •  SYMBICORT 80-4.5 MCG/ACT inhaler, , Disp: , Rfl:           ROS  Review of Systems   Cardiovascular: Negative for chest pain, claudication, dyspnea on exertion, irregular heartbeat, palpitations and syncope.   Respiratory: Negative for cough, shortness of breath and wheezing.          SOCIAL HX  Social History     Socioeconomic History   • Marital status:      Spouse name: Not on file   • Number of children: Not on file   • Years of education: Not on file   • Highest education level: Not on file   Tobacco Use   • Smoking status: Former Smoker     Packs/day: 1.00     Types: Cigarettes     Last attempt to quit:      Years since quittin.5   • Smokeless tobacco: Never Used   Substance and Sexual Activity   • Alcohol use: No   • Drug use: No   • Sexual activity: Defer       FAMILY HX  Family History   Problem Relation Age of Onset   • Other Other         BLOOD CLOTS   • Cancer Other    • Heart disease Other    • Diabetes Other    • Arthritis Other    • Stroke Mother    • Diabetes Mother    • Heart disease Father    • No Known Problems Sister    • No Known Problems Brother    • Cancer Maternal Grandmother    • No Known Problems Sister              Sven Escamilla III, MD, [facility]

## 2020-08-07 RX ORDER — CHLORTHALIDONE 25 MG/1
12.5 TABLET ORAL DAILY
Qty: 15 TABLET | Refills: 5 | Status: SHIPPED | OUTPATIENT
Start: 2020-08-07 | End: 2021-05-11 | Stop reason: SDUPTHER

## 2020-11-10 ENCOUNTER — OFFICE VISIT (OUTPATIENT)
Dept: CARDIOLOGY | Facility: CLINIC | Age: 60
End: 2020-11-10

## 2020-11-10 VITALS
DIASTOLIC BLOOD PRESSURE: 70 MMHG | BODY MASS INDEX: 36.04 KG/M2 | WEIGHT: 216.3 LBS | OXYGEN SATURATION: 98 % | SYSTOLIC BLOOD PRESSURE: 112 MMHG | HEART RATE: 75 BPM | HEIGHT: 65 IN

## 2020-11-10 DIAGNOSIS — E78.00 HYPERCHOLESTEROLEMIA: ICD-10-CM

## 2020-11-10 DIAGNOSIS — I10 ESSENTIAL HYPERTENSION: ICD-10-CM

## 2020-11-10 DIAGNOSIS — I25.10 CORONARY ARTERY DISEASE INVOLVING NATIVE CORONARY ARTERY OF NATIVE HEART WITHOUT ANGINA PECTORIS: Primary | ICD-10-CM

## 2020-11-10 PROCEDURE — 99214 OFFICE O/P EST MOD 30 MIN: CPT | Performed by: INTERNAL MEDICINE

## 2020-11-10 RX ORDER — ESTRADIOL 10 UG/1
INSERT VAGINAL
COMMUNITY
Start: 2020-10-26 | End: 2021-05-11

## 2020-11-10 RX ORDER — DICLOFENAC SODIUM 75 MG/1
1 TABLET, DELAYED RELEASE ORAL AS NEEDED
COMMUNITY
Start: 2020-11-09 | End: 2021-05-11

## 2020-11-10 RX ORDER — LISINOPRIL 20 MG/1
TABLET ORAL DAILY
COMMUNITY
Start: 2020-10-19 | End: 2020-11-10

## 2020-11-10 NOTE — PROGRESS NOTES
[facility]  Office visit  Bri Boyce  1960  [phone]    VISIT DATE:  11/10/2020      PCP: Clarke Barragan MD  [address] [place] [address] [address] [place] [id number]    CC:  Chief Complaint   Patient presents with   • Coronary Artery Disease   • Shortness of Breath       PROBLEM LIST:  1. Non-obstructive CAD  a. Left  heart catheterization, October 2011, 40% blockage off the circumflex  and 30% blockage of the LAD.  40% blockage of the RCA.  1. Echocardiogram, 04/09/2015: EF 55% to 60% with no regional wall motion abnormalities.  2. Exercise Cardiolite, 04/09/2015: Normal exercise Cardiolite with decreased exercise capacity.  2. Hypertension  3.  Hypercholesterolemia  4. Chest pain  5. Abnormal heart rhythm  6. Arthritis  7. Past surgeries:   a.  Cyst removed from ovary.  b. Cyst removed from right breast.  c. Tubal ligation    Previous cardiac studies and procedures:  June 2020  2-week ambulatory ECG monitor  · A relatively benign monitor study.  · Shortness of air correlates with sinus rhythm. Palpitations correlate with PACs and short bursts of nonsustained atrial tachycardia.  Exercise myocardial perfusion imaging  · Mild mild calcifications visualized in the mid LAD and proximal circumflex.  · Pt denied any chest pain or discomfort, did report significant SOA (O2 sat 93-98%) during stress.  · Expected exercise time: 7:05, actual time: 4:00  · No ECG evidence of myocardial ischemia  · Left ventricular ejection fraction is hyperdynamic (Calculated EF > 70%).  · Myocardial perfusion imaging indicates a normal myocardial perfusion study with no evidence of ischemia.  · Impressions are consistent with a low risk study.  Echo  · Estimated EF appears to be in the range of 56 - 60%.  · Left ventricular diastolic dysfunction (grade II) consistent with pseudonormalization.    ASSESSMENT:   Diagnosis Plan   1. Coronary artery disease involving native coronary artery of native heart without angina  "pectoris     2. Hypercholesterolemia     3. Essential hypertension         PLAN:  Coronary artery disease, nonobstructive: Continue aspirin, statin and afterload reduction.  Reassuring perfusion imaging.    Hyperlipidemia: Goal LDL less than 100.  Continue atorvastatin 80 mg by mouth daily. annual lipid panel and CMP.  Encouraged regular exercise and dietary modification.    Hypertension: Goal less than 130/80 mmHg.  Currently well-controlled.  Continue current medical therapy, ACE inhibitor and thiazide diuretic.    Symptomatic PACs and PVCs: Improving symptomatically on beta-blockade, continue metoprolol tartrate 25 mg p.o. twice daily.    Diastolic dysfunction, grade 2: Currently euvolemic.  Continue current medical therapy.  Encouraged dietary modifications and regular exercise to achieve weight loss.  Improving functional capacity with regular exercise.    Subjective  Improving functional capacity, is now able to walk down to the end of her driveway and back without stopping which is about half a mile.  Episodes of palpitations improved significantly on beta-blockade. Denies myalgias on statin.  She is compliant with medical therapy.  She stopped smoking at the time of her heart catheterization in 2011.  Denies chest pain.  Blood pressures running less than 130/80 mmHg.      PHYSICAL EXAMINATION:  Vitals:    11/10/20 1024   BP: 112/70   BP Location: Right arm   Patient Position: Sitting   Pulse: 75   SpO2: 98%   Weight: 98.1 kg (216 lb 4.8 oz)   Height: 165.1 cm (65\")     General Appearance:    Alert, cooperative, no distress, appears stated age   Head:    Normocephalic, without obvious abnormality, atraumatic   Eyes:    conjunctiva/corneas clear   Nose:   Nares normal, septum midline, mucosa normal, no drainage   Throat:   Lips, teeth and gums normal   Neck:   Supple, symmetrical, trachea midline, no carotid    bruit or JVD   Lungs:     Clear to auscultation bilaterally, respirations unlabored   Chest Wall:    " No tenderness or deformity    Heart:    Regular rate and rhythm, S1 and S2 normal, no murmur, rub   or gallop, normal carotid impulse bilaterally without bruit.   Abdomen:     Soft, non-tender   Extremities:   Extremities normal, atraumatic, no cyanosis.  Trivial pretibial edema   Pulses:   2+ and symmetric all extremities   Skin:   Skin color, texture, turgor normal, no rashes or lesions       Diagnostic Data:  Procedures  No results found for: CHLPL, TRIG, HDL, LDLDIRECT  No results found for: GLUCOSE, BUN, CREATININE, NA, K, CL, CO2, CREATININE, BUN  No results found for: HGBA1C  No results found for: WBC, HGB, HCT, PLT    Allergies  Allergies   Allergen Reactions   • Percocet [Oxycodone-Acetaminophen] Nausea And Vomiting       Current Medications    Current Outpatient Medications:   •  albuterol sulfate  (90 Base) MCG/ACT inhaler, Inhale 2 puffs Every 4 (Four) Hours As Needed for Wheezing., Disp: , Rfl:   •  aspirin 81 MG tablet, Take 81 mg by mouth Daily., Disp: , Rfl:   •  atorvastatin (LIPITOR) 80 MG tablet, Take 80 mg by mouth Daily., Disp: , Rfl:   •  chlorthalidone (HYGROTON) 25 MG tablet, Take 0.5 tablets by mouth Daily., Disp: 15 tablet, Rfl: 5  •  cyclobenzaprine (FLEXERIL) 10 MG tablet, Take 10 mg by mouth As Needed., Disp: , Rfl:   •  diclofenac (VOLTAREN) 75 MG EC tablet, Take 1 tablet by mouth As Needed., Disp: , Rfl:   •  Diphenhydramine-APAP, sleep, (TYLENOL PM EXTRA STRENGTH PO), Take 1 tablet by mouth As Needed., Disp: , Rfl:   •  estradiol (VAGIFEM) 10 MCG tablet vaginal tablet, I 1 T VAGINALLY 2 TIMES A WK, Disp: , Rfl:   •  isosorbide mononitrate (IMDUR) 60 MG 24 hr tablet, Take 60 mg by mouth Daily., Disp: , Rfl:   •  lisinopril (PRINIVIL,ZESTRIL) 10 MG tablet, Take 10 mg by mouth Daily., Disp: , Rfl:   •  metoprolol tartrate (LOPRESSOR) 25 MG tablet, Take 1 tablet by mouth 2 (Two) Times a Day., Disp: 60 tablet, Rfl: 11  •  WIXELA INHUB 250-50 MCG/DOSE DISKUS, USE 1 INHALATION PO BID,  Disp: , Rfl:           ROS  Review of Systems   Cardiovascular: Negative for chest pain, claudication, dyspnea on exertion, irregular heartbeat, palpitations and syncope.   Respiratory: Negative for cough, shortness of breath and wheezing.          SOCIAL HX  Social History     Socioeconomic History   • Marital status:      Spouse name: Not on file   • Number of children: Not on file   • Years of education: Not on file   • Highest education level: Not on file   Tobacco Use   • Smoking status: Former Smoker     Packs/day: 1.00     Types: Cigarettes     Quit date:      Years since quittin.8   • Smokeless tobacco: Never Used   Substance and Sexual Activity   • Alcohol use: No   • Drug use: No   • Sexual activity: Defer       FAMILY HX  Family History   Problem Relation Age of Onset   • Other Other         BLOOD CLOTS   • Cancer Other    • Heart disease Other    • Diabetes Other    • Arthritis Other    • Stroke Mother    • Diabetes Mother    • Heart disease Father    • No Known Problems Sister    • No Known Problems Brother    • Cancer Maternal Grandmother    • No Known Problems Sister              Sven Escamilla III, MD, FACC

## 2021-05-11 ENCOUNTER — OFFICE VISIT (OUTPATIENT)
Dept: CARDIOLOGY | Facility: CLINIC | Age: 61
End: 2021-05-11

## 2021-05-11 VITALS
HEART RATE: 74 BPM | DIASTOLIC BLOOD PRESSURE: 72 MMHG | BODY MASS INDEX: 35.65 KG/M2 | HEIGHT: 65 IN | WEIGHT: 214 LBS | OXYGEN SATURATION: 97 % | SYSTOLIC BLOOD PRESSURE: 118 MMHG

## 2021-05-11 DIAGNOSIS — E78.00 HYPERCHOLESTEROLEMIA: ICD-10-CM

## 2021-05-11 DIAGNOSIS — I10 ESSENTIAL HYPERTENSION: ICD-10-CM

## 2021-05-11 DIAGNOSIS — I25.10 CORONARY ARTERY DISEASE INVOLVING NATIVE CORONARY ARTERY OF NATIVE HEART WITHOUT ANGINA PECTORIS: Primary | ICD-10-CM

## 2021-05-11 PROBLEM — M54.2 NECK PAIN: Status: ACTIVE | Noted: 2021-05-11

## 2021-05-11 PROBLEM — M25.519 SHOULDER PAIN: Status: ACTIVE | Noted: 2021-05-11

## 2021-05-11 PROBLEM — M54.9 BACK PAIN: Status: ACTIVE | Noted: 2021-05-11

## 2021-05-11 PROCEDURE — 99214 OFFICE O/P EST MOD 30 MIN: CPT | Performed by: INTERNAL MEDICINE

## 2021-05-11 RX ORDER — FLUTICASONE PROPIONATE 50 MCG
SPRAY, SUSPENSION (ML) NASAL
COMMUNITY
Start: 2021-03-12 | End: 2022-07-26

## 2021-05-11 RX ORDER — CEPHALEXIN 500 MG/1
CAPSULE ORAL
COMMUNITY
Start: 2021-03-04 | End: 2021-05-11

## 2021-05-11 RX ORDER — CHLORTHALIDONE 25 MG/1
12.5 TABLET ORAL DAILY
Qty: 45 TABLET | Refills: 1 | Status: SHIPPED | OUTPATIENT
Start: 2021-05-11 | End: 2022-06-15

## 2021-05-11 RX ORDER — FLUCONAZOLE 150 MG/1
TABLET ORAL
COMMUNITY
End: 2021-05-11

## 2021-05-11 RX ORDER — PREDNISOLONE ACETATE 10 MG/ML
SUSPENSION/ DROPS OPHTHALMIC
COMMUNITY
Start: 2021-02-08 | End: 2021-05-11

## 2021-05-11 RX ORDER — LISINOPRIL 20 MG/1
20 TABLET ORAL DAILY
COMMUNITY
Start: 2021-04-19 | End: 2021-05-11

## 2021-05-11 NOTE — PROGRESS NOTES
[facility]  Office visit  Bir Boyce  1960  [phone]    VISIT DATE:  5/11/2021      PCP: Laney Contreras PA  [address] [place] [address]    CC:  Chief Complaint   Patient presents with   • Coronary Artery Disease       PROBLEM LIST:  1. Non-obstructive CAD  a. Left  heart catheterization, October 2011, 40% blockage off the circumflex  and 30% blockage of the LAD.  40% blockage of the RCA.  1. Echocardiogram, 04/09/2015: EF 55% to 60% with no regional wall motion abnormalities.  2. Exercise Cardiolite, 04/09/2015: Normal exercise Cardiolite with decreased exercise capacity.  2. Hypertension  3.  Hypercholesterolemia  4. Chest pain  5. Abnormal heart rhythm  6. Arthritis  7. Past surgeries:   a.  Cyst removed from ovary.  b. Cyst removed from right breast.  c. Tubal ligation    Previous cardiac studies and procedures:  June 2020  2-week ambulatory ECG monitor  · A relatively benign monitor study.  · Shortness of air correlates with sinus rhythm. Palpitations correlate with PACs and short bursts of nonsustained atrial tachycardia.  Exercise myocardial perfusion imaging  · Mild mild calcifications visualized in the mid LAD and proximal circumflex.  · Pt denied any chest pain or discomfort, did report significant SOA (O2 sat 93-98%) during stress.  · Expected exercise time: 7:05, actual time: 4:00  · No ECG evidence of myocardial ischemia  · Left ventricular ejection fraction is hyperdynamic (Calculated EF > 70%).  · Myocardial perfusion imaging indicates a normal myocardial perfusion study with no evidence of ischemia.  · Impressions are consistent with a low risk study.  Echo  · Estimated EF appears to be in the range of 56 - 60%.  · Left ventricular diastolic dysfunction (grade II) consistent with pseudonormalization.    ASSESSMENT:   Diagnosis Plan   1. Coronary artery disease involving native coronary artery of native heart without angina pectoris     2.  "Hypercholesterolemia     3. Essential hypertension         PLAN:  Coronary artery disease, nonobstructive: Continue aspirin, statin and afterload reduction.  Currently asymptomatic.    Hyperlipidemia: Goal LDL less than 100.  Continue atorvastatin 80 mg by mouth daily. annual lipid panel and CMP.  Encouraged regular exercise and dietary modification.    Hypertension: Goal less than 130/80 mmHg.  Currently well-controlled.  Continue current medical therapy, ACE inhibitor and thiazide diuretic.    Symptomatic PACs and PVCs: Improving symptomatically on beta-blockade, continue metoprolol tartrate 25 mg p.o. twice daily.    Diastolic dysfunction, grade 2: Currently euvolemic.  Continue current medical therapy.  Encouraged dietary modifications and regular exercise to achieve weight loss.  Improving functional capacity with regular exercise.    Subjective  Stable functional capacity.  Currently not exercising on a regular basis but wants to begin walking again.  Palpitations are being well controlled on beta-blockade. Denies myalgias on statin.  She is compliant with medical therapy.  She stopped smoking at the time of her heart catheterization in 2011.  Denies chest pain.  Blood pressures running less than 130/80 mmHg.      PHYSICAL EXAMINATION:  Vitals:    05/11/21 0908   Weight: 97.1 kg (214 lb)   Height: 165.1 cm (65\")     General Appearance:    Alert, cooperative, no distress, appears stated age   Head:    Normocephalic, without obvious abnormality, atraumatic   Eyes:    conjunctiva/corneas clear   Nose:   Nares normal, septum midline, mucosa normal, no drainage   Throat:   Lips, teeth and gums normal   Neck:   Supple, symmetrical, trachea midline, no carotid    bruit or JVD   Lungs:     Clear to auscultation bilaterally, respirations unlabored   Chest Wall:    No tenderness or deformity    Heart:    Regular rate and rhythm, S1 and S2 normal, no murmur, rub   or gallop, normal carotid impulse bilaterally without " bruit.   Abdomen:     Soft, non-tender   Extremities:   Extremities normal, atraumatic, no cyanosis.  Trivial pretibial edema   Pulses:   2+ and symmetric all extremities   Skin:   Skin color, texture, turgor normal, no rashes or lesions       Diagnostic Data:  Procedures  No results found for: CHLPL, TRIG, HDL, LDLDIRECT  No results found for: GLUCOSE, BUN, CREATININE, NA, K, CL, CO2, CREATININE, BUN  No results found for: HGBA1C  No results found for: WBC, HGB, HCT, PLT    Allergies  Allergies   Allergen Reactions   • Percocet [Oxycodone-Acetaminophen] Nausea And Vomiting       Current Medications    Current Outpatient Medications:   •  albuterol sulfate  (90 Base) MCG/ACT inhaler, Inhale 2 puffs Every 4 (Four) Hours As Needed for Wheezing., Disp: , Rfl:   •  aspirin 81 MG tablet, Take 81 mg by mouth Daily., Disp: , Rfl:   •  atorvastatin (LIPITOR) 80 MG tablet, Take 80 mg by mouth Daily., Disp: , Rfl:   •  chlorthalidone (HYGROTON) 25 MG tablet, Take 0.5 tablets by mouth Daily., Disp: 15 tablet, Rfl: 5  •  cyclobenzaprine (FLEXERIL) 10 MG tablet, Take 10 mg by mouth As Needed., Disp: , Rfl:   •  Diphenhydramine-APAP, sleep, (TYLENOL PM EXTRA STRENGTH PO), Take 1 tablet by mouth As Needed., Disp: , Rfl:   •  fluticasone (FLONASE) 50 MCG/ACT nasal spray, 2 SPRAYS IN EACH NOSTRIL EVERY DAY, Disp: , Rfl:   •  isosorbide mononitrate (IMDUR) 60 MG 24 hr tablet, Take 60 mg by mouth Daily., Disp: , Rfl:   •  lisinopril (PRINIVIL,ZESTRIL) 10 MG tablet, Take 10 mg by mouth Daily., Disp: , Rfl:   •  metoprolol tartrate (LOPRESSOR) 25 MG tablet, Take 1 tablet by mouth 2 (Two) Times a Day., Disp: 60 tablet, Rfl: 11          ROS  Review of Systems   Cardiovascular: Negative for chest pain, claudication, dyspnea on exertion, irregular heartbeat, palpitations and syncope.   Respiratory: Negative for cough, shortness of breath and wheezing.          SOCIAL HX  Social History     Socioeconomic History   • Marital status:       Spouse name: Not on file   • Number of children: Not on file   • Years of education: Not on file   • Highest education level: Not on file   Tobacco Use   • Smoking status: Former Smoker     Packs/day: 1.00     Types: Cigarettes     Quit date: 2011     Years since quitting: 10.3   • Smokeless tobacco: Never Used   Substance and Sexual Activity   • Alcohol use: No   • Drug use: No   • Sexual activity: Defer       FAMILY HX  Family History   Problem Relation Age of Onset   • Other Other         BLOOD CLOTS   • Cancer Other    • Heart disease Other    • Diabetes Other    • Arthritis Other    • Stroke Mother    • Diabetes Mother    • Heart disease Father    • No Known Problems Sister    • No Known Problems Brother    • Cancer Maternal Grandmother    • No Known Problems Sister              Sven Escamilla III, MD, FACC

## 2021-08-08 ENCOUNTER — HOSPITAL ENCOUNTER (EMERGENCY)
Age: 61
Discharge: HOME | End: 2021-08-08
Payer: COMMERCIAL

## 2021-08-08 VITALS
OXYGEN SATURATION: 98 % | HEART RATE: 63 BPM | TEMPERATURE: 98.1 F | RESPIRATION RATE: 18 BRPM | SYSTOLIC BLOOD PRESSURE: 133 MMHG | DIASTOLIC BLOOD PRESSURE: 63 MMHG

## 2021-08-08 VITALS
RESPIRATION RATE: 18 BRPM | TEMPERATURE: 98.1 F | HEART RATE: 63 BPM | OXYGEN SATURATION: 98 % | SYSTOLIC BLOOD PRESSURE: 133 MMHG | DIASTOLIC BLOOD PRESSURE: 63 MMHG

## 2021-08-08 VITALS — BODY MASS INDEX: 34.9 KG/M2

## 2021-08-08 DIAGNOSIS — Z20.822: Primary | ICD-10-CM

## 2021-08-08 DIAGNOSIS — R51.9: ICD-10-CM

## 2021-08-08 PROCEDURE — U0003 INFECTIOUS AGENT DETECTION BY NUCLEIC ACID (DNA OR RNA); SEVERE ACUTE RESPIRATORY SYNDROME CORONAVIRUS 2 (SARS-COV-2) (CORONAVIRUS DISEASE [COVID-19]), AMPLIFIED PROBE TECHNIQUE, MAKING USE OF HIGH THROUGHPUT TECHNOLOGIES AS DESCRIBED BY CMS-2020-01-R: HCPCS

## 2021-08-08 PROCEDURE — 99202 OFFICE O/P NEW SF 15 MIN: CPT

## 2021-08-08 PROCEDURE — G0463 HOSPITAL OUTPT CLINIC VISIT: HCPCS

## 2021-11-09 ENCOUNTER — TELEPHONE (OUTPATIENT)
Dept: CARDIOLOGY | Facility: CLINIC | Age: 61
End: 2021-11-09

## 2021-11-09 DIAGNOSIS — E78.00 HYPERCHOLESTEROLEMIA: ICD-10-CM

## 2021-11-09 DIAGNOSIS — I10 ESSENTIAL HYPERTENSION: ICD-10-CM

## 2021-11-09 DIAGNOSIS — I25.10 CORONARY ARTERY DISEASE INVOLVING NATIVE CORONARY ARTERY OF NATIVE HEART WITHOUT ANGINA PECTORIS: Primary | ICD-10-CM

## 2021-11-09 NOTE — TELEPHONE ENCOUNTER
Notified of message above from . Requesting lab requisitions be faxed to her PCP at [phone]. Lab requisitions faxed.

## 2021-11-18 ENCOUNTER — APPOINTMENT (OUTPATIENT)
Dept: GENERAL RADIOLOGY | Facility: HOSPITAL | Age: 61
End: 2021-11-18

## 2021-11-18 ENCOUNTER — HOSPITAL ENCOUNTER (EMERGENCY)
Facility: HOSPITAL | Age: 61
Discharge: HOME | End: 2021-11-19
Attending: STUDENT IN AN ORGANIZED HEALTH CARE EDUCATION/TRAINING PROGRAM | Admitting: STUDENT IN AN ORGANIZED HEALTH CARE EDUCATION/TRAINING PROGRAM

## 2021-11-18 DIAGNOSIS — R07.9 NONSPECIFIC CHEST PAIN: ICD-10-CM

## 2021-11-18 DIAGNOSIS — R00.0 RAPID HEART RATE: Primary | ICD-10-CM

## 2021-11-18 PROCEDURE — 84443 ASSAY THYROID STIM HORMONE: CPT | Performed by: STUDENT IN AN ORGANIZED HEALTH CARE EDUCATION/TRAINING PROGRAM

## 2021-11-18 PROCEDURE — 80053 COMPREHEN METABOLIC PANEL: CPT | Performed by: STUDENT IN AN ORGANIZED HEALTH CARE EDUCATION/TRAINING PROGRAM

## 2021-11-18 PROCEDURE — 83880 ASSAY OF NATRIURETIC PEPTIDE: CPT | Performed by: STUDENT IN AN ORGANIZED HEALTH CARE EDUCATION/TRAINING PROGRAM

## 2021-11-18 PROCEDURE — 83735 ASSAY OF MAGNESIUM: CPT | Performed by: STUDENT IN AN ORGANIZED HEALTH CARE EDUCATION/TRAINING PROGRAM

## 2021-11-18 PROCEDURE — 85025 COMPLETE CBC W/AUTO DIFF WBC: CPT

## 2021-11-18 PROCEDURE — 93005 ELECTROCARDIOGRAM TRACING: CPT | Performed by: STUDENT IN AN ORGANIZED HEALTH CARE EDUCATION/TRAINING PROGRAM

## 2021-11-18 PROCEDURE — 99283 EMERGENCY DEPT VISIT LOW MDM: CPT

## 2021-11-18 PROCEDURE — 71045 X-RAY EXAM CHEST 1 VIEW: CPT

## 2021-11-18 PROCEDURE — 84484 ASSAY OF TROPONIN QUANT: CPT | Performed by: STUDENT IN AN ORGANIZED HEALTH CARE EDUCATION/TRAINING PROGRAM

## 2021-11-18 PROCEDURE — 93005 ELECTROCARDIOGRAM TRACING: CPT

## 2021-11-18 RX ORDER — SODIUM CHLORIDE 0.9 % (FLUSH) 0.9 %
10 SYRINGE (ML) INJECTION AS NEEDED
Status: DISCONTINUED | OUTPATIENT
Start: 2021-11-18 | End: 2021-11-19 | Stop reason: HOSPADM

## 2021-11-19 VITALS
SYSTOLIC BLOOD PRESSURE: 127 MMHG | HEIGHT: 65 IN | TEMPERATURE: 98.2 F | DIASTOLIC BLOOD PRESSURE: 73 MMHG | BODY MASS INDEX: 35.99 KG/M2 | OXYGEN SATURATION: 95 % | WEIGHT: 216 LBS | RESPIRATION RATE: 18 BRPM | HEART RATE: 84 BPM

## 2021-11-19 LAB
ALBUMIN SERPL-MCNC: 4.5 G/DL (ref 3.5–5.2)
ALBUMIN/GLOB SERPL: 1.6 G/DL
ALP SERPL-CCNC: 102 U/L (ref 39–117)
ALT SERPL W P-5'-P-CCNC: 26 U/L (ref 1–33)
ANION GAP SERPL CALCULATED.3IONS-SCNC: 13 MMOL/L (ref 5–15)
AST SERPL-CCNC: 27 U/L (ref 1–32)
BASOPHILS # BLD AUTO: 0.08 10*3/MM3 (ref 0–0.2)
BASOPHILS NFR BLD AUTO: 0.9 % (ref 0–1.5)
BILIRUB SERPL-MCNC: 0.4 MG/DL (ref 0–1.2)
BUN SERPL-MCNC: 17 MG/DL (ref 6–20)
BUN/CREAT SERPL: 21 (ref 7–25)
CALCIUM SPEC-SCNC: 9.5 MG/DL (ref 8.6–10.5)
CHLORIDE SERPL-SCNC: 100 MMOL/L (ref 98–107)
CO2 SERPL-SCNC: 27 MMOL/L (ref 22–29)
CREAT SERPL-MCNC: 0.81 MG/DL (ref 0.57–1)
DEPRECATED RDW RBC AUTO: 44.3 FL (ref 37–54)
EOSINOPHIL # BLD AUTO: 0.24 10*3/MM3 (ref 0–0.4)
EOSINOPHIL NFR BLD AUTO: 2.7 % (ref 0.3–6.2)
ERYTHROCYTE [DISTWIDTH] IN BLOOD BY AUTOMATED COUNT: 12 % (ref 12.3–15.4)
GFR SERPL CREATININE-BSD FRML MDRD: 72 ML/MIN/1.73
GLOBULIN UR ELPH-MCNC: 2.8 GM/DL
GLUCOSE SERPL-MCNC: 202 MG/DL (ref 65–99)
HCT VFR BLD AUTO: 41.5 % (ref 34–46.6)
HGB BLD-MCNC: 14.5 G/DL (ref 12–15.9)
HOLD SPECIMEN: NORMAL
IMM GRANULOCYTES # BLD AUTO: 0.07 10*3/MM3 (ref 0–0.05)
IMM GRANULOCYTES NFR BLD AUTO: 0.8 % (ref 0–0.5)
LYMPHOCYTES # BLD AUTO: 3.38 10*3/MM3 (ref 0.7–3.1)
LYMPHOCYTES NFR BLD AUTO: 38.2 % (ref 19.6–45.3)
MAGNESIUM SERPL-MCNC: 1.9 MG/DL (ref 1.6–2.6)
MCH RBC QN AUTO: 35 PG (ref 26.6–33)
MCHC RBC AUTO-ENTMCNC: 34.9 G/DL (ref 31.5–35.7)
MCV RBC AUTO: 100.2 FL (ref 79–97)
MONOCYTES # BLD AUTO: 0.68 10*3/MM3 (ref 0.1–0.9)
MONOCYTES NFR BLD AUTO: 7.7 % (ref 5–12)
NEUTROPHILS NFR BLD AUTO: 4.39 10*3/MM3 (ref 1.7–7)
NEUTROPHILS NFR BLD AUTO: 49.7 % (ref 42.7–76)
NRBC BLD AUTO-RTO: 0 /100 WBC (ref 0–0.2)
NT-PROBNP SERPL-MCNC: 17.4 PG/ML (ref 0–900)
PLATELET # BLD AUTO: 326 10*3/MM3 (ref 140–450)
PMV BLD AUTO: 9.6 FL (ref 6–12)
POTASSIUM SERPL-SCNC: 3.9 MMOL/L (ref 3.5–5.2)
PROT SERPL-MCNC: 7.3 G/DL (ref 6–8.5)
RBC # BLD AUTO: 4.14 10*6/MM3 (ref 3.77–5.28)
SODIUM SERPL-SCNC: 140 MMOL/L (ref 136–145)
TROPONIN T SERPL-MCNC: <0.01 NG/ML (ref 0–0.03)
TROPONIN T SERPL-MCNC: <0.01 NG/ML (ref 0–0.03)
TSH SERPL DL<=0.05 MIU/L-ACNC: 3.01 UIU/ML (ref 0.27–4.2)
WBC NRBC COR # BLD: 8.84 10*3/MM3 (ref 3.4–10.8)
WHOLE BLOOD HOLD SPECIMEN: NORMAL
WHOLE BLOOD HOLD SPECIMEN: NORMAL

## 2021-11-19 PROCEDURE — 84484 ASSAY OF TROPONIN QUANT: CPT | Performed by: PHYSICIAN ASSISTANT

## 2021-11-19 RX ORDER — TAMSULOSIN HYDROCHLORIDE 0.4 MG/1
1 CAPSULE ORAL DAILY
COMMUNITY

## 2021-11-19 NOTE — DISCHARGE INSTRUCTIONS
Symptomatic care is recommended. Take all medications as prescribed and instructed. Follow up with your primary care as directed and cardiologist as scheduled or return to Emergency Department with worsening of symptoms.

## 2021-11-19 NOTE — ED PROVIDER NOTES
Subjective   Patient is a 61 year old female who presents to the emergency room for evaluation of a rapid hear rate and chest pain. Patient shares that today she started to experience a rapid heart rate. She denies anything specific that made her symptoms come on and cannot correlate them to any certain event of activity. Patient shares that she experienced pain in her chest with the fast hear rate. She denies any additional associated symptoms. She reports recently starting a new medication to control her heart rate and has a follow up appointment scheduled with her cardiologist, Dr. Sven Escamilla      Palpitations  Palpitations quality:  Fast  Onset quality:  Insidious  Duration:  1 day  Timing:  Sporadic  Progression:  Waxing and waning  Chronicity:  New  Context: not anxiety, not caffeine, not exercise and not stimulant use    Relieved by:  None tried  Worsened by:  Nothing  Ineffective treatments:  None tried  Associated symptoms: chest pain and chest pressure        Review of Systems   Constitutional: Negative for activity change, appetite change, chills, fatigue and fever.   HENT: Negative.    Respiratory: Negative.    Cardiovascular: Positive for chest pain and palpitations.   Gastrointestinal: Negative.    Genitourinary: Negative.    Musculoskeletal: Negative.    Skin: Negative.    Neurological: Negative.    Psychiatric/Behavioral: Negative.        Past Medical History:   Diagnosis Date   • Abnormal heart rhythm 10/14/2016   • Arthritis 10/14/2016   • CAD (coronary artery disease) 10/14/2016    Non-obstructive CAD   • Chest pain 10/14/2016   • Heart disease    • Hypercholesterolemia 10/14/2016   • Hypertension 10/14/2016   • Pneumonia 12/2019       Allergies   Allergen Reactions   • Percocet [Oxycodone-Acetaminophen] Nausea And Vomiting       Past Surgical History:   Procedure Laterality Date   • OTHER SURGICAL HISTORY      CYST REMOVAL BREAST   • OVARIAN CYST SURGERY     • TUBAL ABDOMINAL LIGATION          Family History   Problem Relation Age of Onset   • Other Other         BLOOD CLOTS   • Cancer Other    • Heart disease Other    • Diabetes Other    • Arthritis Other    • Stroke Mother    • Diabetes Mother    • Heart disease Father    • No Known Problems Sister    • No Known Problems Brother    • Cancer Maternal Grandmother    • No Known Problems Sister        Social History     Socioeconomic History   • Marital status:    Tobacco Use   • Smoking status: Former Smoker     Packs/day: 1.00     Types: Cigarettes     Quit date: 2011     Years since quitting: 10.8   • Smokeless tobacco: Never Used   Substance and Sexual Activity   • Alcohol use: No   • Drug use: No   • Sexual activity: Defer           Objective   Physical Exam  Vitals and nursing note reviewed.   Constitutional:       General: She is not in acute distress.     Appearance: Normal appearance. She is not ill-appearing or toxic-appearing.   HENT:      Head: Normocephalic and atraumatic.      Nose: Nose normal.      Mouth/Throat:      Mouth: Mucous membranes are moist.   Eyes:      Extraocular Movements: Extraocular movements intact.      Conjunctiva/sclera: Conjunctivae normal.   Cardiovascular:      Rate and Rhythm: Normal rate and regular rhythm.      Pulses: Normal pulses.   Pulmonary:      Effort: Pulmonary effort is normal. No respiratory distress.   Abdominal:      General: There is no distension.   Musculoskeletal:         General: Normal range of motion.      Cervical back: Normal range of motion.   Skin:     General: Skin is warm and dry.   Neurological:      General: No focal deficit present.      Mental Status: She is alert.   Psychiatric:         Mood and Affect: Mood normal.         Behavior: Behavior normal.         Thought Content: Thought content normal.         Judgment: Judgment normal.         Procedures           ED Course  ED Course as of 11/20/21 0008   Sat Nov 20, 2021   0005 Patient presents to ED with complaints of chest  pain. No acute or emergent findings on physical exam. Labs and urinalysis without acute or emergent abnormalities. Initial and two hour troponin negative. Imaging of chest demonsrates no acute cardiopulmonary process. EKG with no acute changes or ST elevations. Doubt ACS, PE, dissection, or emergent cardiothoracic process at this time based on exam, history, clinical presentation, gestalt, objective findings in the ED, and risk stratification (heart score 3). Patient is afebrile and non-toxic in appearance with vital signs stable with oxygen saturation of 95% on room air. Patient will be discharged home with symptomatic care and outpatient follow up to primary care and her scheduled appointment with her cardiologist. Patient is agreeable to plan of care, provided clear return precautions and demonstrated understanding. [JG]      ED Course User Index  [JG] Alexander Rose PA      Recent Results (from the past 24 hour(s))   Troponin    Collection Time: 11/19/21  1:59 AM    Specimen: Blood   Result Value Ref Range    Troponin T <0.010 0.000 - 0.030 ng/mL     Note: In addition to lab results from this visit, the labs listed above may include labs taken at another facility or during a different encounter within the last 24 hours. Please correlate lab times with ED admission and discharge times for further clarification of the services performed during this visit.    XR Chest 1 View   Final Result   No acute cardiopulmonary findings.      Signer Name: Gabriele Leonard MD    Signed: 11/19/2021 12:11 AM    Workstation Name: [facility]     [facility]        Vitals:    11/19/21 0200 11/19/21 0215 11/19/21 0230 11/19/21 0245   BP: 125/79  127/73    Pulse: 80 79 84 84   Resp:       Temp:       TempSrc:       SpO2: 91% 96% 92% 95%   Weight:       Height:         Medications - No data to display  ECG/EMG Results (last 24 hours)     ** No results found for the last 24 hours. **        ECG 12 Lead                                               HEART Score (for prediction of 6-week risk of major adverse cardiac event) reviewed and/or performed as part of the patient evaluation and treatment planning process.  The result associated with this review/performance is: 3       MDM  Number of Diagnoses or Management Options  Nonspecific chest pain: new and requires workup  Rapid heart rate: new and requires workup     Amount and/or Complexity of Data Reviewed  Clinical lab tests: reviewed  Tests in the radiology section of CPT®: reviewed  Tests in the medicine section of CPT®: reviewed    Risk of Complications, Morbidity, and/or Mortality  Presenting problems: moderate  Diagnostic procedures: moderate  Management options: moderate    Patient Progress  Patient progress: improved      Final diagnoses:   Rapid heart rate   Nonspecific chest pain       ED Disposition  ED Disposition     ED Disposition Condition Comment    Discharge Stable           Laney Contreras PA  [address] [place] [address] [phone]    Call   As needed    Sven Escamilla III, MD  [address] [facility]  [address] [facility] [address]  [place] [address]  [phone]    On 11/23/2021  Keep appointment as scheduled with cardiology    [facility] Emergency Department  [address] [address] [phone]  Go to   If symptoms worsen         Medication List      No changes were made to your prescriptions during this visit.          Alexander Rose PA  11/20/21 0008

## 2021-11-20 LAB
QT INTERVAL: 374 MS
QTC INTERVAL: 436 MS

## 2021-11-23 ENCOUNTER — OFFICE VISIT (OUTPATIENT)
Dept: CARDIOLOGY | Facility: CLINIC | Age: 61
End: 2021-11-23

## 2021-11-23 ENCOUNTER — LAB (OUTPATIENT)
Dept: LAB | Facility: HOSPITAL | Age: 61
End: 2021-11-23

## 2021-11-23 VITALS
DIASTOLIC BLOOD PRESSURE: 72 MMHG | WEIGHT: 215 LBS | HEIGHT: 65 IN | OXYGEN SATURATION: 96 % | HEART RATE: 95 BPM | BODY MASS INDEX: 35.82 KG/M2 | SYSTOLIC BLOOD PRESSURE: 126 MMHG

## 2021-11-23 DIAGNOSIS — I10 PRIMARY HYPERTENSION: ICD-10-CM

## 2021-11-23 DIAGNOSIS — R73.09 ABNORMAL GLUCOSE: ICD-10-CM

## 2021-11-23 DIAGNOSIS — E78.00 HYPERCHOLESTEROLEMIA: ICD-10-CM

## 2021-11-23 DIAGNOSIS — I25.10 CORONARY ARTERY DISEASE INVOLVING NATIVE CORONARY ARTERY OF NATIVE HEART WITHOUT ANGINA PECTORIS: Primary | ICD-10-CM

## 2021-11-23 LAB
ANION GAP SERPL CALCULATED.3IONS-SCNC: 10.3 MMOL/L (ref 5–15)
BUN SERPL-MCNC: 16 MG/DL (ref 6–20)
BUN/CREAT SERPL: 23.5 (ref 7–25)
CALCIUM SPEC-SCNC: 9.8 MG/DL (ref 8.6–10.5)
CHLORIDE SERPL-SCNC: 102 MMOL/L (ref 98–107)
CHOLEST SERPL-MCNC: 148 MG/DL (ref 0–200)
CO2 SERPL-SCNC: 27.7 MMOL/L (ref 22–29)
CREAT SERPL-MCNC: 0.68 MG/DL (ref 0.57–1)
GFR SERPL CREATININE-BSD FRML MDRD: 89 ML/MIN/1.73
GLUCOSE SERPL-MCNC: 145 MG/DL (ref 65–99)
HDLC SERPL-MCNC: 34 MG/DL (ref 40–60)
LDLC SERPL CALC-MCNC: 90 MG/DL (ref 0–100)
LDLC/HDLC SERPL: 2.58 {RATIO}
POTASSIUM SERPL-SCNC: 4.4 MMOL/L (ref 3.5–5.2)
SODIUM SERPL-SCNC: 140 MMOL/L (ref 136–145)
TRIGL SERPL-MCNC: 131 MG/DL (ref 0–150)
VLDLC SERPL-MCNC: 24 MG/DL (ref 5–40)

## 2021-11-23 PROCEDURE — 80061 LIPID PANEL: CPT | Performed by: INTERNAL MEDICINE

## 2021-11-23 PROCEDURE — 80048 BASIC METABOLIC PNL TOTAL CA: CPT

## 2021-11-23 PROCEDURE — 36415 COLL VENOUS BLD VENIPUNCTURE: CPT

## 2021-11-23 PROCEDURE — 83036 HEMOGLOBIN GLYCOSYLATED A1C: CPT

## 2021-11-23 PROCEDURE — 99214 OFFICE O/P EST MOD 30 MIN: CPT | Performed by: INTERNAL MEDICINE

## 2021-11-23 RX ORDER — MONTELUKAST SODIUM 10 MG/1
10 TABLET ORAL DAILY
COMMUNITY
Start: 2021-10-22

## 2021-11-23 NOTE — PROGRESS NOTES
[facility]  Office visit  Bri Boyce  1960  [phone]    VISIT DATE:  11/23/2021      PCP: Romulo Costa, APRN  [address] [place] [id number]    CC:  Chief Complaint   Patient presents with   • Coronary artery disease involving native coronary artery of        PROBLEM LIST:  1. Non-obstructive CAD  a. Left  heart catheterization, October 2011, 40% blockage off the circumflex  and 30% blockage of the LAD.  40% blockage of the RCA.  1. Echocardiogram, 04/09/2015: EF 55% to 60% with no regional wall motion abnormalities.  2. Exercise Cardiolite, 04/09/2015: Normal exercise Cardiolite with decreased exercise capacity.  2. Hypertension  3.  Hypercholesterolemia  4. Chest pain  5. Abnormal heart rhythm  6. Arthritis  7. Past surgeries:   a.  Cyst removed from ovary.  b. Cyst removed from right breast.  c. Tubal ligation    Previous cardiac studies and procedures:  June 2020  2-week ambulatory ECG monitor  · A relatively benign monitor study.  · Shortness of air correlates with sinus rhythm. Palpitations correlate with PACs and short bursts of nonsustained atrial tachycardia.  Exercise myocardial perfusion imaging  · Mild mild calcifications visualized in the mid LAD and proximal circumflex.  · Pt denied any chest pain or discomfort, did report significant SOA (O2 sat 93-98%) during stress.  · Expected exercise time: 7:05, actual time: 4:00  · No ECG evidence of myocardial ischemia  · Left ventricular ejection fraction is hyperdynamic (Calculated EF > 70%).  · Myocardial perfusion imaging indicates a normal myocardial perfusion study with no evidence of ischemia.  · Impressions are consistent with a low risk study.  Echo  · Estimated EF appears to be in the range of 56 - 60%.  · Left ventricular diastolic dysfunction (grade II) consistent with pseudonormalization.    ASSESSMENT:   Diagnosis Plan   1. Coronary artery disease involving native coronary artery of native  "heart without angina pectoris     2. Hypercholesterolemia     3. Primary hypertension         PLAN:  Coronary artery disease, nonobstructive: Continue aspirin, statin and afterload reduction.  Currently asymptomatic.    Hyperlipidemia: Goal LDL less than 100.  Continue atorvastatin 80 mg by mouth daily. annual lipid panel and CMP.  Encouraged regular exercise and dietary modification.    Hypertension: Goal less than 130/80 mmHg.  Currently well-controlled.  Continue current medical therapy, ACE inhibitor and thiazide diuretic.    Symptomatic PACs and PVCs: Improving symptomatically on beta-blockade, continue metoprolol tartrate 25 mg p.o. twice daily.    Diastolic dysfunction, grade 2: Currently euvolemic.  Continue current medical therapy.  Encouraged dietary modifications and regular exercise to achieve weight loss.  Improving functional capacity with regular exercise.    Palpitations: Low risk cardiac evaluation recently.  Given a prescription for metoprolol tartrate 25 mg p.o. every 8 hours to take as needed for palpitations.  Will arrange for ambulatory ECG monitor if symptoms return.    Subjective  Recent episode of tachypalpitations with atypical chest pain which woke her up from sleep and was intermittent throughout the day.  Episodes do not last more than 5 minutes.  No triggers.  No alleviating factors.  Evaluation the ED was unremarkable.    PHYSICAL EXAMINATION:  Vitals:    11/23/21 0857   BP: 126/72   BP Location: Right arm   Patient Position: Sitting   Pulse: 95   SpO2: 96%   Weight: 97.5 kg (215 lb)   Height: 165.1 cm (65\")     General Appearance:    Alert, cooperative, no distress, appears stated age   Head:    Normocephalic, without obvious abnormality, atraumatic   Eyes:    conjunctiva/corneas clear   Nose:   Nares normal, septum midline, mucosa normal, no drainage   Throat:   Lips, teeth and gums normal   Neck:   Supple, symmetrical, trachea midline, no carotid    bruit or JVD   Lungs:     Clear " to auscultation bilaterally, respirations unlabored   Chest Wall:    No tenderness or deformity    Heart:    Regular rate and rhythm, S1 and S2 normal, no murmur, rub   or gallop, normal carotid impulse bilaterally without bruit.   Abdomen:     Soft, non-tender   Extremities:   Extremities normal, atraumatic, no cyanosis.  Trivial pretibial edema   Pulses:   2+ and symmetric all extremities   Skin:   Skin color, texture, turgor normal, no rashes or lesions       Diagnostic Data:  Procedures  No results found for: CHLPL, TRIG, HDL, LDLDIRECT  Lab Results   Component Value Date    GLUCOSE 202 (H) 11/18/2021    BUN 17 11/18/2021    CREATININE 0.81 11/18/2021     11/18/2021    K 3.9 11/18/2021     11/18/2021    CO2 27.0 11/18/2021     No results found for: HGBA1C  Lab Results   Component Value Date    WBC 8.84 11/18/2021    HGB 14.5 11/18/2021    HCT 41.5 11/18/2021     11/18/2021       Allergies  Allergies   Allergen Reactions   • Percocet [Oxycodone-Acetaminophen] Nausea And Vomiting       Current Medications    Current Outpatient Medications:   •  albuterol sulfate  (90 Base) MCG/ACT inhaler, Inhale 2 puffs Every 4 (Four) Hours As Needed for Wheezing., Disp: , Rfl:   •  aspirin 81 MG tablet, Take 81 mg by mouth Daily., Disp: , Rfl:   •  atorvastatin (LIPITOR) 80 MG tablet, Take 80 mg by mouth Daily., Disp: , Rfl:   •  chlorthalidone (HYGROTON) 25 MG tablet, Take 0.5 tablets by mouth Daily., Disp: 45 tablet, Rfl: 1  •  cyclobenzaprine (FLEXERIL) 10 MG tablet, Take 10 mg by mouth As Needed., Disp: , Rfl:   •  Diphenhydramine-APAP, sleep, (TYLENOL PM EXTRA STRENGTH PO), Take 1 tablet by mouth As Needed., Disp: , Rfl:   •  fluticasone (FLONASE) 50 MCG/ACT nasal spray, 2 SPRAYS IN EACH NOSTRIL EVERY DAY, Disp: , Rfl:   •  isosorbide mononitrate (IMDUR) 60 MG 24 hr tablet, Take 60 mg by mouth Daily., Disp: , Rfl:   •  lisinopril (PRINIVIL,ZESTRIL) 10 MG tablet, Take 10 mg by mouth Daily., Disp: ,  Rfl:   •  metoprolol tartrate (LOPRESSOR) 25 MG tablet, TAKE 1 TABLET BY MOUTH TWICE DAILY, Disp: 180 tablet, Rfl: 1  •  montelukast (SINGULAIR) 10 MG tablet, Take 10 mg by mouth Daily., Disp: , Rfl:   •  tamsulosin (FLOMAX) 0.4 MG capsule 24 hr capsule, Take 1 capsule by mouth Daily., Disp: , Rfl:           ROS  Review of Systems   Cardiovascular: Negative for chest pain, claudication, dyspnea on exertion, irregular heartbeat, palpitations and syncope.   Respiratory: Negative for cough, shortness of breath and wheezing.          SOCIAL HX  Social History     Socioeconomic History   • Marital status:    Tobacco Use   • Smoking status: Former Smoker     Packs/day: 1.00     Types: Cigarettes     Quit date: 2011     Years since quitting: 10.9   • Smokeless tobacco: Never Used   Vaping Use   • Vaping Use: Never used   Substance and Sexual Activity   • Alcohol use: No   • Drug use: No   • Sexual activity: Defer       FAMILY HX  Family History   Problem Relation Age of Onset   • Other Other         BLOOD CLOTS   • Cancer Other    • Heart disease Other    • Diabetes Other    • Arthritis Other    • Stroke Mother    • Diabetes Mother    • Heart disease Father    • No Known Problems Sister    • No Known Problems Brother    • Cancer Maternal Grandmother    • No Known Problems Sister              Sven Escamilla III, MD, [facility]

## 2021-11-24 ENCOUNTER — TELEPHONE (OUTPATIENT)
Dept: CARDIOLOGY | Facility: CLINIC | Age: 61
End: 2021-11-24

## 2021-11-24 DIAGNOSIS — R73.09 ABNORMAL GLUCOSE: Primary | ICD-10-CM

## 2021-11-24 DIAGNOSIS — R73.09 ELEVATED HEMOGLOBIN A1C: ICD-10-CM

## 2021-11-24 LAB — HBA1C MFR BLD: 6.79 % (ref 4.8–5.6)

## 2021-11-24 RX ORDER — METFORMIN HYDROCHLORIDE 500 MG/1
500 TABLET, EXTENDED RELEASE ORAL
Qty: 30 TABLET | Refills: 3 | Status: SHIPPED | OUTPATIENT
Start: 2021-11-24 | End: 2022-03-21

## 2021-11-24 NOTE — TELEPHONE ENCOUNTER
----- Message from Sven Escamilla III, MD sent at 11/24/2021  8:44 AM EST -----  Bad cholesterol levels are currently where you want them to be.  Continue current medications.  Blood sugar levels are falling into the diabetic range.  Would recommend starting Metformin extended release 500 mg p.o. daily and refer her to diabetic educator.

## 2021-12-13 ENCOUNTER — HOSPITAL ENCOUNTER (OUTPATIENT)
Dept: DIABETES SERVICES | Facility: HOSPITAL | Age: 61
Setting detail: RECURRING SERIES
Discharge: HOME | End: 2021-12-13

## 2021-12-13 NOTE — CONSULTS
Diabetes Education    Patient Name:  Bri Boyce  YOB: 1960  MRN: [id number]  Admit Date:  12/13/2021        This medical referred consult was provided as a telephone call, tele-health or e-visit, as patient is unable to attend an in-office appointment due to the COVID-19 crisis. Consent for treatment was given verbally.  Mrs. Boyce attended her scheduled 90 minute diabetes education visit.  Please see media tab for assessment and notes if you use [employer]. If you are not an [employer] user a copy of patient's assessment and notes will be sent per routine. Thank you for the referral.         Electronically signed by:  Zhanna Lieberman, MSN, APRN  12/13/21 11:30 EST

## 2022-03-21 RX ORDER — METFORMIN HYDROCHLORIDE 500 MG/1
500 TABLET, EXTENDED RELEASE ORAL
Qty: 30 TABLET | Refills: 1 | Status: SHIPPED | OUTPATIENT
Start: 2022-03-21 | End: 2022-06-20

## 2022-03-28 ENCOUNTER — TELEPHONE (OUTPATIENT)
Dept: CARDIOLOGY | Facility: CLINIC | Age: 62
End: 2022-03-28

## 2022-03-28 NOTE — TELEPHONE ENCOUNTER
CJ  Wants you to know that she stopped the Glucophage. It caused severe diarrhea. Her PCP has placed her on Ozempic.

## 2022-05-16 ENCOUNTER — TELEPHONE (OUTPATIENT)
Dept: CARDIOLOGY | Facility: CLINIC | Age: 62
End: 2022-05-16

## 2022-05-16 NOTE — TELEPHONE ENCOUNTER
Communicated Dr. Escamilla's recommendations to patient. Patient verbalized understanding and will call the office on Wednesday afternoon with updated BP and heart rate readings.

## 2022-05-16 NOTE — TELEPHONE ENCOUNTER
Patient has been experiencing episodes of hypotension with associated s/s of fatigue and lightheadedness, onset last week.     Patient saw her PCP last Tuesday. BP at that time was 96/67. PCP suggested decreasing Imdur from 60 MG to 30 MG.      BP this morning was 84/53, HR 80 BPM, 97/56, HR 89BPM, and 96/63, HR 84 BPM.  Patient took Metoprolol this morning. Advised patient to hold Metoprolol if her SBP < 90.    Other cardiac meds include Lisinopril 10 MG and Metoprolol 25 BID.    Suggested increasing fluid intake and elevating extremities.    Any other suggestions?

## 2022-05-19 NOTE — TELEPHONE ENCOUNTER
Patient called with BP updates after d/c'ing Lisinopril.    Monday at noon BP 89/58 HR 77 BPM  Monday at bedtime /90     Tuesday 0800 117/75 HR 94 BPM      1400 113/70 HR 87 BPM      1130 150/96 HR 88BPM    Wednesday 0800 118/84  BPM    Thursday   118/75 HR 91 BPM           1240 122/69 HR 89 BPM            300 125/76 HR 86 BPM    Advised patient to continue on medication regimen. Patient verbalized understanding.

## 2022-06-15 RX ORDER — CHLORTHALIDONE 25 MG/1
TABLET ORAL
Qty: 45 TABLET | Refills: 0 | Status: SHIPPED | OUTPATIENT
Start: 2022-06-15 | End: 2022-11-14

## 2022-06-16 NOTE — PROGRESS NOTES
"Chief complaint: \"Neck and low back pain\"      History of present illness: Mrs. Bri Boyce is a 62 y.o. female, who returns to the clinic for evaluation of recurrent chronic neck pain.  She was last seen on March 4, 2020, when she underwent bilateral cervical medial branch rhizotomies at C3, C4, and C5, from which she tells me has provided her with 80% pain relief lasting about 1 1/2 years.  She cancelled a few follow-ups thereafter which was in the midst of COVID, and has not been seen since her procedure.  She has done fairly well, and tells me cervical rhizotomies provide her with much benefit in her neck pain.  She is also having some recent lower back pain.  She tells me she has been seen by orthopedics, Dr. Brandon Melendez, and has recently underwent a lumbar medial branch block, as well as a lumbar epidural steroid injection from which she tells me did not provide her with any benefit.  She tells me she has participated in recent physical therapy for the last six weeks her neck and low back pain.  She denies any changes in her medical history since she was last seen.  She presents today with a new MRI of the cervical and lumbar spine.  Today she wishes to proceed with treatment of her neck.  Pain History: Ms. Bri Boyce, 62 y.o. female originally referred by Dr. Tay Ayala in consultation for chronic intractable neck pain.  Patient reports a now 3 year history of pain, which began without incident.   Pain Description: constant pain with intermittent exacerbation, described as sharp, aching, and \"on fire\" sensation.   Radiation of Pain: The pain radiates into the bilateral occipital region and into the bilateral shoulders.   Pain intensity today: 3/10  Average pain intensity last week: 5/10  Pain intensity ranges from: 3/10 to 7/10  Aggravating factors: Pain increases with extension and rotation of the cervical spine   Alleviating factors: Pain decreases with massage and lying down "     Associated Symptoms:   Patient denies pain, numbness, or weakness in the upper extremities.   Patient denies any new bladder or bowel problems.   Patient denies difficulties with her balance or recent falls.   Patient reports bilateral occipital headaches lasting occurring 1-2 times per month.      Review of previous therapies and additional medical records:  Bri Boyce has already failed the following measures, including:   Conservative measures: oral analgesics, massage, physical therapy, chiropractic therapy and acupuncture   Interventional: 08/31/2015: Bilateral cervical RFA  3/4/2020: Bilateral cervical RFA  LESI and lumbar MBB by Dr. Brandon Melendez out benefit.  I will obtain records.  Surgical: No previous history of cervical spine or shoulder surgery  Bri Boyce underwent neurosurgical  consultation with Dr. Tay Ayala on 6/12/2015 and on 10/10/2020, and was found not to be a surgical candidate.   Bri Boyce presents with significant comorbidities including hypertension, hypercholesterolemia, moderate obesity, type 2 diabetes  In terms of current analgesics, Bri Boyce takes: None  I have reviewed her Ankush Report is consistent with medication reconciliation.    SOAPP: Low Risk    Global Pain Scale 02-04  2020 06-20 2022         Pain 10 12         Feelings 3 0         Clinical outcomes 3 0         Activities 0 0         GPS Total: 16 12             Review of New Diagnostic Studies:   MRI of the cervical spine without contrast 3/14/2022: Per radiology report.  Mild reversal of the normal cervical lordosis.  There are degenerative endplate changes most significant at C5-C6.  There are high T2 signal changes noted within the amy, which can be seen with small vessel ischemic related changes and demyelinating processes.  MRI of the brain could be obtained.  C2-C3: No significant spinal canal or neural foraminal stenosis.  C3-C4: Disc osteophyte complex formation  that contacts and mildly effaces the ventral cord surface with mild central spinal canal stenosis.  C4-C5: Small disc osteophyte complex formation that contacts in minimally effaces the ventral cord surface there is mild left neural foraminal narrowing and minimal right neural foraminal narrowing.  C5-C6: Disc osteophyte complex with contact and effacement of the ventral cord surface.  Mild central spinal canal stenosis with moderate left and moderate right neural foraminal stenosis.  C6-C7: Small disc osteophyte complex formation with mild neuroforaminal stenosis, greater on the right.  There is minimal spinal canal stenosis.  C7-T1: Small disc osteophyte complex formation without significant spinal canal or neural foraminal stenosis.  MRI of the brain with and without contrast 6/8/2022: Per radiology report.  No evidence of acute ischemia or intracranial hemorrhage.  There are no abnormal enhancing mass lesions.  The pituitary gland images are normal.  The cerebellar tonsils are in the normal location.  There are flair signal changes noted within the cerebral white matter and in the amy, these are nonspecific signal changes and can be seen with small vessel ischemic disease as well as demyelinating processes among other etiologies a few of these lesions raise suspicion for possible demyelinating process such as multiple sclerosis.  MRI of the lumbar spine without contrast 3/13/2022, per radiology report.  T12-L1: No significant spinal canal or neuroforaminal stenosis.  L1-L2: Small disc bulges with no significant spinal canal or neural foraminal stenosis.  L2-L3: Disc bulge with a small disc extrusion that extends superiorly along the inferior endplate of L2.  No significant spinal canal or neuroforaminal stenosis.  L3-4: Small disc bulge with mild facet arthritis without significant spinal canal or neuroforaminal stenosis.  L4-L5: Mild facet arthritis without significant spinal canal or neuroforaminal  stenosis.  L5-S1: Small disc bulge with a small posterior central disc protrusion with mild facet arthritis.  No significant spinal canal or neuroforaminal stenosis.    Diagnostic Studies:  MRI of the cervical spine without contrast 12/30/2019: Per radiology report:  C2-C3: No significant spinal canal or neural foraminal stenosis.  C3-C4: Small disc osteophyte complex with mild left foraminal stenosis and mild central spinal canal stenosis.  C4-C5: Small disc osteophyte complex with mild to moderate left and mild right neuroforaminal stenosis and mild central spinal canal stenosis.  C5-C6: Disc osteophyte complex with mild to moderate neuroforaminal stenosis and mild to moderate central spinal canal stenosis.  C6-C7: Small disc osteophyte complex with mild to moderate central spinal canal mild left neuroforaminal stenosis.  C7-T1: Disc osteophyte complex with minimal spinal canal stenosis.  No foraminal stenosis.    Review of Systems   Musculoskeletal: Positive for arthralgias, back pain, myalgias, neck pain and neck stiffness.   All other systems reviewed and are negative.       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    • Occipital neuralgia   • DDD (degenerative disc disease), cervical   • Cervical spondylosis without myelopathy   • Neck pain   • Shoulder pain   • Back pain       Past Medical History:   Diagnosis Date   • Abnormal heart rhythm 10/14/2016   • Arthritis 10/14/2016   • CAD (coronary artery disease) 10/14/2016    Non-obstructive CAD   • Chest pain 10/14/2016   • Heart disease    • Hypercholesterolemia 10/14/2016   • Hypertension 10/14/2016   • Pneumonia 12/2019         Past  Surgical History:   Procedure Laterality Date   • OTHER SURGICAL HISTORY      CYST REMOVAL BREAST   • OVARIAN CYST SURGERY     • TUBAL ABDOMINAL LIGATION           Family History   Problem Relation Age of Onset   • Other Other         BLOOD CLOTS   • Cancer Other    • Heart disease Other    • Diabetes Other    • Arthritis Other    • Stroke Mother    • Diabetes Mother    • Heart disease Father    • No Known Problems Sister    • No Known Problems Brother    • Cancer Maternal Grandmother    • No Known Problems Sister          Social History     Socioeconomic History   • Marital status:    Tobacco Use   • Smoking status: Former Smoker     Packs/day: 1.00     Types: Cigarettes     Quit date:      Years since quittin.4   • Smokeless tobacco: Never Used   Vaping Use   • Vaping Use: Never used   Substance and Sexual Activity   • Alcohol use: No   • Drug use: No   • Sexual activity: Defer           Current Outpatient Medications:   •  albuterol sulfate  (90 Base) MCG/ACT inhaler, Inhale 2 puffs Every 4 (Four) Hours As Needed for Wheezing., Disp: , Rfl:   •  aspirin 81 MG tablet, Take 81 mg by mouth Daily., Disp: , Rfl:   •  atorvastatin (LIPITOR) 80 MG tablet, Take 80 mg by mouth Daily., Disp: , Rfl:   •  chlorthalidone (HYGROTON) 25 MG tablet, TAKE 1/2 TABLET BY MOUTH DAILY, Disp: 45 tablet, Rfl: 0  •  Diphenhydramine-APAP, sleep, (TYLENOL PM EXTRA STRENGTH PO), Take 1 tablet by mouth As Needed., Disp: , Rfl:   •  fluticasone (FLONASE) 50 MCG/ACT nasal spray, 2 SPRAYS IN EACH NOSTRIL EVERY DAY, Disp: , Rfl:   •  isosorbide mononitrate (IMDUR) 60 MG 24 hr tablet, Take 60 mg by mouth Daily., Disp: , Rfl:   •  lisinopril (PRINIVIL,ZESTRIL) 10 MG tablet, Take 10 mg by mouth Daily., Disp: , Rfl:   •  lisinopril (PRINIVIL,ZESTRIL) 5 MG tablet, Take 5 mg by mouth Daily., Disp: , Rfl:   •  metoprolol tartrate (LOPRESSOR) 25 MG tablet, TAKE 1 TABLET BY MOUTH TWICE DAILY, Disp: 180 tablet, Rfl: 1  •   "montelukast (SINGULAIR) 10 MG tablet, Take 10 mg by mouth Daily., Disp: , Rfl:   •  Ozempic, 0.25 or 0.5 MG/DOSE, 2 MG/1.5ML solution pen-injector, INJECT 0.5MG ONCE WEEKLY AS DIRECTED, Disp: , Rfl:   •  tamsulosin (FLOMAX) 0.4 MG capsule 24 hr capsule, Take 1 capsule by mouth Daily., Disp: , Rfl:   •  Wixela Inhub 100-50 MCG/ACT DISKUS, Inhale 1 puff 2 (Two) Times a Day., Disp: , Rfl:   •  metoprolol tartrate (LOPRESSOR) 25 MG tablet, Take 1 tablet by mouth Every 8 (Eight) Hours As Needed (palpitations)., Disp: 30 tablet, Rfl: 3      Allergies   Allergen Reactions   • Percocet [Oxycodone-Acetaminophen] Nausea And Vomiting         The following portions of the patient's history were reviewed and updated as appropriate: problem list, past medical history, past surgery history, social history, family history, medications, and allergies     /84   Pulse 72   Temp 96.8 °F (36 °C)   Ht 165.1 cm (65\")   Wt 90.6 kg (199 lb 12.8 oz)   SpO2 98%   BMI 33.25 kg/m²       Physical Exam   Constitutional: Patient appears, well-developed, well-nourished, well-hydrated  HEENT: Head: Normocephalic and atraumatic  Eyes: Conjunctivae and lids are normal  Pupils: Equal, round, reactive to light  Neck: Trachea normal. Neck supple. No JVD present.   Pulmonary: No increased work of breathing or signs of respiratory distress.   Musculoskeletal   Gait and station: Gait evaluation demonstrated a normal gait   Cervical spine: Passive and active range of motion are limited secondary to pain. Extension, flexion, lateral flexion, rotation of the cervical spine increased and reproduced pain. Cervical facet joint loading maneuvers are positive.  Muscles: Presence of active trigger points at the bilateral levator scapulae, bilateral trapezius and bilateral rhomboids  Shoulders: The range of motion of the glenohumeral joints is full and without pain. Rotator cuff strength is 5/5.   Lumbar spine: Passive and active range of motion are " almost full and without significant pain. Extension and flexion of the lumbar spine increased and reproduced some pain. Lumbar facet joint loading maneuvers are equivocal.  Héctor test, Gaenslen's test, SI compression test, Yeoman's test positive on the right.   Piriformis maneuvers are negative.    Palpation of the bilateral ischial tuberosities, unrevealing.    Palpation of the bilateral greater trochanter, reveals tenderness bilaterally.    Examination of the Iliotibial band: unrevealing.    Hip joints: The range of motion of the hip joints is full and without pain on the left, limited to internal rotation on the right.  Neurological:   Patient is alert and oriented to person, place, and time.   Speech: Normal.   Cortical function: Normal mental status.   Cranial nerves 2-12: intact.   Reflex Scores:  Right brachioradialis: 2+  Left brachioradialis: 2+  Right biceps: 2+  Left biceps: 2+  Right triceps: 2+  Left triceps: 2+  Right patellar: 1+  Left patellar: 1+  Right Achilles: 1+  Left Achilles: 1+  Motor strength: 5/5  Motor Tone: Normal  Involuntary movements: None.   Superficial/Primitive Reflexes: Primitive reflexes were absent.   Right Deleon: Absent  Left Deleon: Absent  Right ankle clonus: Absent  Left ankle clonus: Absent   Spurling sign: Negative. Neck tornado test: Negative. Lhermitte sign: Negative. Long tract signs: Negative.  Straight leg raising test: Negative.  Femoral stretch sign: Negative  Sensory exam: Intact to light touch, intact pain and temperature sensation, intact vibration sensation and normal proprioception.   Coordination: Normal finger to nose and heel to shin. Normal balance and negative Romberg's sign   Skin and subcutaneous tissue: Skin is warm and intact. No rash noted. No cyanosis.   Psychiatric: Judgment and insight: Normal. Recent and remote memory: Intact. Mood and affect: Normal.        ASSESSMENT:   1. Cervical spondylosis without myelopathy    2. Myofascial pain    3.  Bilateral occipital neuralgia    4. DDD (degenerative disc disease), cervical    5. Physical deconditioning    6. Spondylosis of lumbar region without myelopathy or radiculopathy    7. Coronary artery disease involving native coronary artery of native heart without angina pectoris        PLAN/MEDICAL DECISION MAKING: Patient presents with a 3-year history of mechanical neck pain.  She has underwent cervical rhizotomies in the past with significant benefit, as regards her HPI.  Recent MRI of the cervical spine revealed to level degenerative changes, with facet arthritis.  At C3-C4 there is disc osteophyte complex formation that contacts and mildly effaces the ventral cord surface with mild central spinal canal stenosis. C4-C5: Small disc osteophyte complex formation that contacts in minimally effaces the ventral cord surface there is mild left neural foraminal narrowing and minimal right neural foraminal narrowing. At C5-C6: Disc osteophyte complex with contact and effacement of the ventral cord surface.  Mild central spinal canal stenosis with moderate left and moderate right neural foraminal stenosis. Her symptoms remain more mechanical.  She also has quite a bit of myofascial symptoms upon physical examination. She is also having some recent lower back pain.  She tells me she has been seen by orthopedics, Dr. Brandon Melendez, and has recently underwent a lumbar medial branch block, as well as a lumbar epidural steroid injection from which she tells me did not provide her with any benefit.  She has participated in recent physical therapy for at least 6 weeks.  She has also been referred to a spine surgeon per her report for her lower back.  She also has a known history of bilateral hip arthritis and bilateral greater trochanteric bursitis, from which she gets injections, from which she reports does provide her with benefit.  Upon physical examination I did find positive findings for right sacroiliac joint dysfunction,  as she reports some pain extending into her right thigh stopping above the knee.  She does have mechanical lower back pain as well from facet arthritis.  I will obtain some further diagnostic studies, if she wishes to address her lower back pain as well as her right sacroiliac joint dysfunction, we will arrange for follow-up after completion of her neck.  Also of note she had some abnormal findings on her cervical MRI, which prompted an MRI of the brain with and without contrast.  She tells me she has been undergoing work-up with neurology, which thus far has been negative she will follow-up with them for regular scans.  Patient has failed to obtain pain relief with conservative measures as referenced above. I have reviewed all available patient's medical records as well as previous therapies as referenced above. I had a lengthy conversation with Ms. Bri Boyce regarding her chronic pain condition and potential therapeutic options including risks, benefits, alternative therapies, to name a few. Therefore, I have proposed the following plan:  1. Diagnostic studies:  A. X-rays of the cervical spine full view with flexion and extension  B. Lumbar spine x-rays with flexion-extension views  C. Bilateral hip x-rays  2. Pharmacological measures: Reviewed and discussed;   A. Patient takes at this time.  She reports she has tried muscle relaxants, anti-inflammatories, topical creams, to name a few which do not provide her with any benefit.   3. Interventional pain management measures:   A. Treatment of neck pain: Patient will be scheduled for one set of diagnostic bilateral cervical medial branch blocks at C3, C4, C5; for bilateral cervical facet joints at C3-C4, C4-C5 to clarify the origin of chronic refractory mechanical/axial cervicalgia. If patient experiences more than 80% pain relief along with significant improvement in the range of motion of the cervical spine, then, patient will be scheduled for bilateral  cervical medial branch rhizotomies combined with trigger point injections at the bilateral levator scapulae, bilateral trapezius, and bilateral rhomboids to maximize her rehabilitation.  If she continues with occipital headaches we could consider diagnostic and therapeutic bilateral greater occipital nerve blocks by hydrodissection technique under ultrasound and fluoroscopic guidance.  Other options include the possibility of cervical epidural steroid injection by interlaminar approach.   B. Treatment of lower back pain/SI joint pain: I briefly discussed with patient the possibility of diagnostic right sacroiliac joint injection with local anesthetics to determine the origin of her pain.  I have also briefly discussed with her lumbar facet joint injections as well as lumbar medial branch blocks to address her mechanical lower back pain.  4. Long-term rehabilitation efforts:  A. The patient does not have a history of falls. I did complete a risk assessment for falls.   B. Patient will start a comprehensive physical therapy program for upper body strengthening/posture correction, core strengthening, neurodynamics, myofascial release, cupping and dry needling   C. Start an exercise program such as yoga and Pilates  D. Contrast therapy: Apply ice-packs for 15-20 minutes, followed by heating pads for 15-20 minutes to affected area   5. The patient has been instructed to contact my office with any questions or difficulties. The patient understands the plan and agrees to proceed accordingly.    The patient has a documented plan of care to address chronic pain.   Bri Boyce reports a pain score of 3.  Given her pain assessment as noted, treatment options were discussed and the following options were decided upon as a follow-up plan to address the patient's pain: continuation of current treatment plan for pain, home exercises and therapy and use of non-medical modalities (ice, heat, stretching and/or behavior  modifications).          Pain Medications             aspirin 81 MG tablet Take 81 mg by mouth Daily.           Patient Care Team:  Romulo Costa APRN as PCP - General (Nurse Practitioner)  Tay Ayala MD as Consulting Physician (Neurosurgery)  Sven Escamilla III, MD as Consulting Physician (Cardiology)  Miguel Whitten MD as Consulting Physician (Pain Medicine)  Marti Abdullahi APRN as Nurse Practitioner (Pain Medicine)     No orders of the defined types were placed in this encounter.        Future Appointments   Date Time Provider Department Center   6/20/2022 10:45 AM [facility] 1 [facility]   6/20/2022 11:00 AM [facility]   6/20/2022 11:15 AM [facility] 1 [facility]   7/6/2022 10:00 AM Miguel Whitten MD [facility]   7/26/2022  3:45 PM Sven Escamilla III, MD [facility]         BALDO Lew     Please note that portions of this note were completed with a voice recognition program.

## 2022-06-20 ENCOUNTER — OFFICE VISIT (OUTPATIENT)
Dept: PAIN MEDICINE | Facility: CLINIC | Age: 62
End: 2022-06-20

## 2022-06-20 ENCOUNTER — HOSPITAL ENCOUNTER (OUTPATIENT)
Dept: GENERAL RADIOLOGY | Facility: HOSPITAL | Age: 62
Discharge: HOME | End: 2022-06-20

## 2022-06-20 VITALS
HEIGHT: 65 IN | TEMPERATURE: 96.8 F | BODY MASS INDEX: 33.29 KG/M2 | OXYGEN SATURATION: 98 % | WEIGHT: 199.8 LBS | HEART RATE: 72 BPM | SYSTOLIC BLOOD PRESSURE: 132 MMHG | DIASTOLIC BLOOD PRESSURE: 84 MMHG

## 2022-06-20 DIAGNOSIS — M47.812 CERVICAL SPONDYLOSIS WITHOUT MYELOPATHY: ICD-10-CM

## 2022-06-20 DIAGNOSIS — M47.816 SPONDYLOSIS OF LUMBAR REGION WITHOUT MYELOPATHY OR RADICULOPATHY: ICD-10-CM

## 2022-06-20 DIAGNOSIS — R53.81 PHYSICAL DECONDITIONING: ICD-10-CM

## 2022-06-20 DIAGNOSIS — I25.10 CORONARY ARTERY DISEASE INVOLVING NATIVE CORONARY ARTERY OF NATIVE HEART WITHOUT ANGINA PECTORIS: ICD-10-CM

## 2022-06-20 DIAGNOSIS — M47.812 CERVICAL SPONDYLOSIS WITHOUT MYELOPATHY: Primary | ICD-10-CM

## 2022-06-20 DIAGNOSIS — M54.81 BILATERAL OCCIPITAL NEURALGIA: ICD-10-CM

## 2022-06-20 DIAGNOSIS — M50.30 DDD (DEGENERATIVE DISC DISEASE), CERVICAL: ICD-10-CM

## 2022-06-20 DIAGNOSIS — M79.18 MYOFASCIAL PAIN: ICD-10-CM

## 2022-06-20 PROCEDURE — 72052 X-RAY EXAM NECK SPINE 6/>VWS: CPT

## 2022-06-20 PROCEDURE — 73521 X-RAY EXAM HIPS BI 2 VIEWS: CPT

## 2022-06-20 PROCEDURE — 99214 OFFICE O/P EST MOD 30 MIN: CPT | Performed by: NURSE PRACTITIONER

## 2022-06-20 PROCEDURE — 72114 X-RAY EXAM L-S SPINE BENDING: CPT

## 2022-06-20 RX ORDER — LISINOPRIL 5 MG/1
5 TABLET ORAL DAILY
COMMUNITY
Start: 2022-05-24

## 2022-06-20 RX ORDER — FLUTICASONE PROPIONATE AND SALMETEROL 100; 50 UG/1; UG/1
1 POWDER RESPIRATORY (INHALATION) 2 TIMES DAILY
COMMUNITY
Start: 2022-05-17

## 2022-06-20 RX ORDER — SEMAGLUTIDE 1.34 MG/ML
INJECTION, SOLUTION SUBCUTANEOUS
COMMUNITY
Start: 2022-05-12

## 2022-07-12 DIAGNOSIS — M47.812 CERVICAL SPONDYLOSIS WITHOUT MYELOPATHY: Primary | ICD-10-CM

## 2022-07-18 ENCOUNTER — OUTSIDE FACILITY SERVICE (OUTPATIENT)
Dept: PAIN MEDICINE | Facility: CLINIC | Age: 62
End: 2022-07-18

## 2022-07-18 DIAGNOSIS — M47.812 CERVICAL SPONDYLOSIS WITHOUT MYELOPATHY: Primary | ICD-10-CM

## 2022-07-18 PROCEDURE — 64490 INJ PARAVERT F JNT C/T 1 LEV: CPT | Performed by: ANESTHESIOLOGY

## 2022-07-18 PROCEDURE — 99152 MOD SED SAME PHYS/QHP 5/>YRS: CPT | Performed by: ANESTHESIOLOGY

## 2022-07-18 PROCEDURE — 64491 INJ PARAVERT F JNT C/T 2 LEV: CPT | Performed by: ANESTHESIOLOGY

## 2022-07-19 ENCOUNTER — TELEPHONE (OUTPATIENT)
Dept: PAIN MEDICINE | Facility: CLINIC | Age: 62
End: 2022-07-19

## 2022-07-22 ENCOUNTER — TELEPHONE (OUTPATIENT)
Dept: PAIN MEDICINE | Facility: CLINIC | Age: 62
End: 2022-07-22

## 2022-07-26 ENCOUNTER — OFFICE VISIT (OUTPATIENT)
Dept: CARDIOLOGY | Facility: CLINIC | Age: 62
End: 2022-07-26

## 2022-07-26 VITALS
WEIGHT: 194.8 LBS | HEIGHT: 65 IN | HEART RATE: 80 BPM | OXYGEN SATURATION: 97 % | SYSTOLIC BLOOD PRESSURE: 126 MMHG | BODY MASS INDEX: 32.46 KG/M2 | DIASTOLIC BLOOD PRESSURE: 82 MMHG

## 2022-07-26 DIAGNOSIS — E78.2 MIXED HYPERLIPIDEMIA: ICD-10-CM

## 2022-07-26 DIAGNOSIS — I25.10 CORONARY ARTERY DISEASE INVOLVING NATIVE CORONARY ARTERY OF NATIVE HEART WITHOUT ANGINA PECTORIS: Primary | ICD-10-CM

## 2022-07-26 DIAGNOSIS — I10 PRIMARY HYPERTENSION: ICD-10-CM

## 2022-07-26 PROCEDURE — 99214 OFFICE O/P EST MOD 30 MIN: CPT | Performed by: INTERNAL MEDICINE

## 2022-07-26 NOTE — PROGRESS NOTES
[facility] Cardiology  Office Progress Note  Bri Boyce  1960  [address] [place] [address]       Visit Date: 07/26/22    PCP: Romulo Costa, APRN  [address] [place] [address]    IDENTIFICATION: A 62 y.o. female  for [employer]    PROBLEM LIST:   1. Non-obstructive CAD  a. Left  heart catheterization, October 2011, 40% blockage off the circumflex  and 30% blockage of the LAD.  40% blockage of the RCA.  1. Echocardiogram, 04/09/2015: EF 55% to 60% with no regional wall motion abnormalities.  2. Exercise Cardiolite, 04/09/2015: Normal exercise Cardiolite with decreased exercise capacity.  3. June 2020 exercise MPS: Mild coronary calcifications visualized in mid LAD and proximal circumflex.  Patient denies any chest pain or discomfort.  Did report significant shortness of air (02 sat 93 to 98%) during stress.  Expected: 7:05, actual 4:00.  No ECG evidence of myocardial ischemia.  LVEF >70%.  Perfusion imaging indicates normal myocardial perfusion study with no evidence of ischemia.  No restudy.  2. Diastolic dysfunction  1. June 2020 2D echo: LVEF = 56 to 60%.  LV diastolic dysfunction (grade 2) pseudonormalization.  3. Palpitations  1. June 2020 2-week Holter monitor: Relatively benign monitor study.  Shortness of air correlates with sinus rhythm.  Palpitations correlate with PACs and short bursts of nonsustained atrial tachycardia.  4. Hypertension  5.  Hypercholesterolemia  11/21 148/131/34/90  6. Past surgeries:   a.  Cyst removed from ovary.  b. Cyst removed       CC:   Chief Complaint   Patient presents with   • Coronary artery disease involving native coronary artery of       Allergies  Allergies   Allergen Reactions   • Percocet [Oxycodone-Acetaminophen] Nausea And Vomiting       Current Medications    Current Outpatient Medications:   •  albuterol sulfate  (90 Base) MCG/ACT inhaler, Inhale 2 puffs Every 4 (Four) Hours As Needed  for Wheezing., Disp: , Rfl:   •  aspirin 81 MG tablet, Take 81 mg by mouth Daily., Disp: , Rfl:   •  atorvastatin (LIPITOR) 80 MG tablet, Take 80 mg by mouth Daily., Disp: , Rfl:   •  chlorthalidone (HYGROTON) 25 MG tablet, TAKE 1/2 TABLET BY MOUTH DAILY, Disp: 45 tablet, Rfl: 0  •  Diphenhydramine-APAP, sleep, (TYLENOL PM EXTRA STRENGTH PO), Take 1 tablet by mouth As Needed., Disp: , Rfl:   •  isosorbide mononitrate (IMDUR) 60 MG 24 hr tablet, Take 60 mg by mouth Daily., Disp: , Rfl:   •  lisinopril (PRINIVIL,ZESTRIL) 5 MG tablet, Take 5 mg by mouth Daily., Disp: , Rfl:   •  metoprolol tartrate (LOPRESSOR) 25 MG tablet, TAKE 1 TABLET BY MOUTH TWICE DAILY, Disp: 180 tablet, Rfl: 1  •  metoprolol tartrate (LOPRESSOR) 25 MG tablet, Take 1 tablet by mouth Every 8 (Eight) Hours As Needed (palpitations)., Disp: 30 tablet, Rfl: 3  •  montelukast (SINGULAIR) 10 MG tablet, Take 10 mg by mouth Daily., Disp: , Rfl:   •  Ozempic, 0.25 or 0.5 MG/DOSE, 2 MG/1.5ML solution pen-injector, INJECT 0.5MG ONCE WEEKLY AS DIRECTED, Disp: , Rfl:   •  tamsulosin (FLOMAX) 0.4 MG capsule 24 hr capsule, Take 1 capsule by mouth Daily., Disp: , Rfl:   •  Wixela Inhub 100-50 MCG/ACT DISKUS, Inhale 1 puff 2 (Two) Times a Day., Disp: , Rfl:       History of Present Illness   Bri Boyce is a 62 y.o. year old female here for follow up.    Pt denies any chest pain, dyspnea, dyspnea on exertion, orthopnea, PND,    lower extremity edema, or claudication.  Couple episodes of palpitation which can occur without provocation.  These tend to be fleeting and only seconds in duration.  She has had some neck issues and is scheduled upcoming to see Dr. Whitten for potential rhizotomy  She has had diffuse bruising over her hands and arms with minimal M pact.  She does not change medications taken steroids or any omega-3 fatty acids since last visit  Recently had CBC with acceptable hemogram    OBJECTIVE:  Vitals:    07/26/22 1551   BP: 126/82   BP  "Location: Left arm   Patient Position: Sitting   Pulse: 80   SpO2: 97%   Weight: 88.4 kg (194 lb 12.8 oz)   Height: 165.1 cm (65\")     Body mass index is 32.42 kg/m².    Constitutional:       Appearance: Not in distress.      Comments: Cushingoid appearance   Neck:      Vascular: No JVR. JVD normal.   Pulmonary:      Effort: Pulmonary effort is normal.      Breath sounds: Normal breath sounds. No wheezing. No rhonchi. No rales.   Chest:      Chest wall: Not tender to palpatation.   Cardiovascular:      PMI at left midclavicular line. Normal rate. Regular rhythm. Normal S1. Normal S2.      Murmurs: There is no murmur.      No gallop. No click. No rub.   Pulses:     Intact distal pulses.   Edema:     Peripheral edema absent.   Abdominal:      General: Bowel sounds are normal.      Palpations: Abdomen is soft.      Tenderness: There is no abdominal tenderness.   Musculoskeletal: Normal range of motion.         General: No tenderness. Skin:     General: Skin is warm and dry.   Neurological:      General: No focal deficit present.      Mental Status: Alert and oriented to person, place and time.         Diagnostic Data:  Procedures      ASSESSMENT:   Diagnosis Plan   1. Coronary artery disease involving native coronary artery of native heart without angina pectoris     2. Primary hypertension     3. Mixed hyperlipidemia         PLAN:  CAD historically nonobstructive continue GDMT    Hypertension controlled metoprolol lisinopril isosorbide    Mixed dyslipidemia historically not controlled on high potency atorvastatin would reevaluate lipid profile at nearest convenience with a target LDL of less than 70    Cushingoid appearance with diffuse bruising/striae -would screen with 24-hour urine free cortisol, if elevated referral to endocrinology for for more thorough evaluation of potential Cushing syndrome          Zachery Marie MD, FACC  "

## 2022-08-01 ENCOUNTER — OUTSIDE FACILITY SERVICE (OUTPATIENT)
Dept: PAIN MEDICINE | Facility: CLINIC | Age: 62
End: 2022-08-01

## 2022-08-01 PROCEDURE — 20553 NJX 1/MLT TRIGGER POINTS 3/>: CPT | Performed by: ANESTHESIOLOGY

## 2022-08-01 PROCEDURE — 64634 DESTROY C/TH FACET JNT ADDL: CPT | Performed by: ANESTHESIOLOGY

## 2022-08-01 PROCEDURE — 99152 MOD SED SAME PHYS/QHP 5/>YRS: CPT | Performed by: ANESTHESIOLOGY

## 2022-08-01 PROCEDURE — 64633 DESTROY CERV/THOR FACET JNT: CPT | Performed by: ANESTHESIOLOGY

## 2022-08-02 ENCOUNTER — TELEPHONE (OUTPATIENT)
Dept: PAIN MEDICINE | Facility: CLINIC | Age: 62
End: 2022-08-02

## 2022-08-02 NOTE — TELEPHONE ENCOUNTER
Spoke with patient regarding procedure, she states she is alittle tender and sore, which is common with RFTC and she has no concerns or questions. And is aware of both her follow ups.

## 2022-08-09 RX ORDER — TIZANIDINE 2 MG/1
TABLET ORAL
Qty: 60 TABLET | Refills: 0 | Status: SHIPPED | OUTPATIENT
Start: 2022-08-09 | End: 2022-08-19

## 2022-08-09 NOTE — TELEPHONE ENCOUNTER
Patient called today c/o neck pain and pressure following a RFTC.  Explained that this was common the first weeks following and dr covarrubias agreed and explained that it may be muscle pain due to the location, they had to go through muscle.

## 2022-08-19 RX ORDER — BACLOFEN 10 MG/1
10 TABLET ORAL 3 TIMES DAILY
Qty: 90 TABLET | Refills: 0 | Status: SHIPPED | OUTPATIENT
Start: 2022-08-19 | End: 2022-12-13

## 2022-08-19 RX ORDER — MELOXICAM 7.5 MG/1
7.5 TABLET ORAL 2 TIMES DAILY PRN
Qty: 60 TABLET | Refills: 0 | Status: SHIPPED | OUTPATIENT
Start: 2022-08-19 | End: 2022-09-21

## 2022-08-19 NOTE — TELEPHONE ENCOUNTER
-- DO NOT REPLY / DO NOT REPLY ALL --  -- Message is from the [facility]--    COVID-19 Universal Screening: Negative    General Patient Message      Reason for Call: The patient is requesting for his routine blood work orders to be placed before his annual physical, please call to discuss.     Caller Information       Type Contact Phone    08/17/2020 08:59 AM CDT Phone (Incoming) Neftaly King (Self) [phone] (M)          Alternative phone number: Wife [phone]    Turnaround time given to caller:   \"This message will be sent to [state Provider's name]. The clinical team will fulfill your request as soon as they review your message.\"     Patient called back with same complaint as prior.     Numbness and major pain 18 days post RFTC, and is still following plan of care, aside from PT to which patient states she refuses to go, since it didn't help last time.  Patient was RX'd tizanidine on 8/9 to which the patient said she did not get relief. Is asking for additional advice.  I advised patient that she will be instructed to try PT, but she said she will not, so I advised patient that she should ask her PCP for pain medication if it is this severe incase she does not hear from me before 12.    Please advise further.

## 2022-08-25 ENCOUNTER — HOSPITAL ENCOUNTER (OUTPATIENT)
Facility: HOSPITAL | Age: 62
Setting detail: OBSERVATION
Discharge: HOME | End: 2022-08-28
Attending: EMERGENCY MEDICINE | Admitting: INTERNAL MEDICINE

## 2022-08-25 ENCOUNTER — APPOINTMENT (OUTPATIENT)
Dept: CT IMAGING | Facility: HOSPITAL | Age: 62
End: 2022-08-25

## 2022-08-25 DIAGNOSIS — E08.00 DIABETES MELLITUS DUE TO UNDERLYING CONDITION WITH HYPEROSMOLARITY WITHOUT COMA, UNSPECIFIED WHETHER LONG TERM INSULIN USE: ICD-10-CM

## 2022-08-25 DIAGNOSIS — K29.00 ACUTE SUPERFICIAL GASTRITIS WITHOUT HEMORRHAGE: Primary | ICD-10-CM

## 2022-08-25 DIAGNOSIS — I25.10 CORONARY ARTERIOSCLEROSIS IN NATIVE ARTERY: ICD-10-CM

## 2022-08-25 DIAGNOSIS — R11.10 RECURRENT VOMITING: ICD-10-CM

## 2022-08-25 PROCEDURE — 80053 COMPREHEN METABOLIC PANEL: CPT | Performed by: EMERGENCY MEDICINE

## 2022-08-25 PROCEDURE — 83690 ASSAY OF LIPASE: CPT | Performed by: EMERGENCY MEDICINE

## 2022-08-25 PROCEDURE — 85025 COMPLETE CBC W/AUTO DIFF WBC: CPT | Performed by: EMERGENCY MEDICINE

## 2022-08-25 PROCEDURE — 74176 CT ABD & PELVIS W/O CONTRAST: CPT

## 2022-08-25 PROCEDURE — 84145 PROCALCITONIN (PCT): CPT | Performed by: INTERNAL MEDICINE

## 2022-08-25 PROCEDURE — 36415 COLL VENOUS BLD VENIPUNCTURE: CPT

## 2022-08-25 PROCEDURE — 84484 ASSAY OF TROPONIN QUANT: CPT | Performed by: EMERGENCY MEDICINE

## 2022-08-25 PROCEDURE — 99285 EMERGENCY DEPT VISIT HI MDM: CPT

## 2022-08-25 PROCEDURE — 83036 HEMOGLOBIN GLYCOSYLATED A1C: CPT | Performed by: PHYSICIAN ASSISTANT

## 2022-08-25 RX ORDER — MORPHINE SULFATE 2 MG/ML
2 INJECTION, SOLUTION INTRAMUSCULAR; INTRAVENOUS ONCE
Status: COMPLETED | OUTPATIENT
Start: 2022-08-25 | End: 2022-08-26

## 2022-08-25 RX ORDER — PROCHLORPERAZINE EDISYLATE 5 MG/ML
5 INJECTION INTRAMUSCULAR; INTRAVENOUS ONCE
Status: COMPLETED | OUTPATIENT
Start: 2022-08-25 | End: 2022-08-26

## 2022-08-25 RX ADMIN — SODIUM CHLORIDE 1000 ML: 9 INJECTION, SOLUTION INTRAVENOUS at 23:25

## 2022-08-26 ENCOUNTER — APPOINTMENT (OUTPATIENT)
Dept: CT IMAGING | Facility: HOSPITAL | Age: 62
End: 2022-08-26

## 2022-08-26 PROBLEM — K29.00 ACUTE SUPERFICIAL GASTRITIS WITHOUT HEMORRHAGE: Status: ACTIVE | Noted: 2022-08-26

## 2022-08-26 PROBLEM — E87.20 LACTIC ACIDOSIS: Status: ACTIVE | Noted: 2022-08-26

## 2022-08-26 PROBLEM — R19.7 NAUSEA VOMITING AND DIARRHEA: Status: ACTIVE | Noted: 2022-08-26

## 2022-08-26 PROBLEM — E11.9 TYPE 2 DIABETES MELLITUS (HCC): Status: ACTIVE | Noted: 2022-08-26

## 2022-08-26 PROBLEM — R11.2 INTRACTABLE NAUSEA AND VOMITING: Status: ACTIVE | Noted: 2022-08-26

## 2022-08-26 PROBLEM — R73.9 HYPERGLYCEMIA: Status: ACTIVE | Noted: 2022-08-26

## 2022-08-26 LAB
ADV 40+41 DNA STL QL NAA+NON-PROBE: NOT DETECTED
ALBUMIN SERPL-MCNC: 3.6 G/DL (ref 3.5–5.2)
ALBUMIN SERPL-MCNC: 4 G/DL (ref 3.5–5.2)
ALBUMIN/GLOB SERPL: 1.5 G/DL
ALBUMIN/GLOB SERPL: 2.1 G/DL
ALP SERPL-CCNC: 66 U/L (ref 39–117)
ALP SERPL-CCNC: 80 U/L (ref 39–117)
ALT SERPL W P-5'-P-CCNC: 14 U/L (ref 1–33)
ALT SERPL W P-5'-P-CCNC: 18 U/L (ref 1–33)
ANION GAP SERPL CALCULATED.3IONS-SCNC: 10 MMOL/L (ref 5–15)
ANION GAP SERPL CALCULATED.3IONS-SCNC: 13 MMOL/L (ref 5–15)
AST SERPL-CCNC: 13 U/L (ref 1–32)
AST SERPL-CCNC: 16 U/L (ref 1–32)
ASTRO TYP 1-8 RNA STL QL NAA+NON-PROBE: NOT DETECTED
BASOPHILS # BLD AUTO: 0.02 10*3/MM3 (ref 0–0.2)
BASOPHILS # BLD AUTO: 0.02 10*3/MM3 (ref 0–0.2)
BASOPHILS NFR BLD AUTO: 0.2 % (ref 0–1.5)
BASOPHILS NFR BLD AUTO: 0.4 % (ref 0–1.5)
BILIRUB SERPL-MCNC: 0.8 MG/DL (ref 0–1.2)
BILIRUB SERPL-MCNC: 0.9 MG/DL (ref 0–1.2)
BILIRUB UR QL STRIP: NEGATIVE
BUN SERPL-MCNC: 18 MG/DL (ref 8–23)
BUN SERPL-MCNC: 19 MG/DL (ref 8–23)
BUN/CREAT SERPL: 28.6 (ref 7–25)
BUN/CREAT SERPL: 31.1 (ref 7–25)
C CAYETANENSIS DNA STL QL NAA+NON-PROBE: NOT DETECTED
C COLI+JEJ+UPSA DNA STL QL NAA+NON-PROBE: NOT DETECTED
C DIFF TOX GENS STL QL NAA+PROBE: NOT DETECTED
CALCIUM SPEC-SCNC: 8.2 MG/DL (ref 8.6–10.5)
CALCIUM SPEC-SCNC: 9.3 MG/DL (ref 8.6–10.5)
CHLORIDE SERPL-SCNC: 106 MMOL/L (ref 98–107)
CHLORIDE SERPL-SCNC: 108 MMOL/L (ref 98–107)
CLARITY UR: CLEAR
CO2 SERPL-SCNC: 23 MMOL/L (ref 22–29)
CO2 SERPL-SCNC: 27 MMOL/L (ref 22–29)
COLOR UR: YELLOW
CREAT SERPL-MCNC: 0.61 MG/DL (ref 0.57–1)
CREAT SERPL-MCNC: 0.63 MG/DL (ref 0.57–1)
CRYPTOSP DNA STL QL NAA+NON-PROBE: NOT DETECTED
D-LACTATE SERPL-SCNC: 1.6 MMOL/L (ref 0.5–2)
D-LACTATE SERPL-SCNC: 1.9 MMOL/L (ref 0.5–2)
D-LACTATE SERPL-SCNC: 3 MMOL/L (ref 0.5–2)
DEPRECATED RDW RBC AUTO: 46.5 FL (ref 37–54)
DEPRECATED RDW RBC AUTO: 48.4 FL (ref 37–54)
E HISTOLYT DNA STL QL NAA+NON-PROBE: NOT DETECTED
EAEC PAA PLAS AGGR+AATA ST NAA+NON-PRB: NOT DETECTED
EC STX1+STX2 GENES STL QL NAA+NON-PROBE: NOT DETECTED
EGFRCR SERPLBLD CKD-EPI 2021: 101.7 ML/MIN/1.73
EGFRCR SERPLBLD CKD-EPI 2021: 102.5 ML/MIN/1.73
EOSINOPHIL # BLD AUTO: 0.13 10*3/MM3 (ref 0–0.4)
EOSINOPHIL # BLD AUTO: 0.87 10*3/MM3 (ref 0–0.4)
EOSINOPHIL NFR BLD AUTO: 1.1 % (ref 0.3–6.2)
EOSINOPHIL NFR BLD AUTO: 15.3 % (ref 0.3–6.2)
EPEC EAE GENE STL QL NAA+NON-PROBE: NOT DETECTED
ERYTHROCYTE [DISTWIDTH] IN BLOOD BY AUTOMATED COUNT: 12.5 % (ref 12.3–15.4)
ERYTHROCYTE [DISTWIDTH] IN BLOOD BY AUTOMATED COUNT: 13 % (ref 12.3–15.4)
ETEC LTA+ST1A+ST1B TOX ST NAA+NON-PROBE: NOT DETECTED
FLUAV SUBTYP SPEC NAA+PROBE: NOT DETECTED
FLUBV RNA ISLT QL NAA+PROBE: NOT DETECTED
G LAMBLIA DNA STL QL NAA+NON-PROBE: NOT DETECTED
GLOBULIN UR ELPH-MCNC: 1.7 GM/DL
GLOBULIN UR ELPH-MCNC: 2.6 GM/DL
GLUCOSE BLDC GLUCOMTR-MCNC: 104 MG/DL (ref 70–130)
GLUCOSE BLDC GLUCOMTR-MCNC: 108 MG/DL (ref 70–130)
GLUCOSE BLDC GLUCOMTR-MCNC: 90 MG/DL (ref 70–130)
GLUCOSE SERPL-MCNC: 106 MG/DL (ref 65–99)
GLUCOSE SERPL-MCNC: 125 MG/DL (ref 65–99)
GLUCOSE UR STRIP-MCNC: NEGATIVE MG/DL
HBA1C MFR BLD: 5.8 % (ref 4.8–5.6)
HCT VFR BLD AUTO: 39.9 % (ref 34–46.6)
HCT VFR BLD AUTO: 47.3 % (ref 34–46.6)
HGB BLD-MCNC: 13.8 G/DL (ref 12–15.9)
HGB BLD-MCNC: 16.2 G/DL (ref 12–15.9)
HGB UR QL STRIP.AUTO: NEGATIVE
IMM GRANULOCYTES # BLD AUTO: 0.02 10*3/MM3 (ref 0–0.05)
IMM GRANULOCYTES # BLD AUTO: 0.08 10*3/MM3 (ref 0–0.05)
IMM GRANULOCYTES NFR BLD AUTO: 0.4 % (ref 0–0.5)
IMM GRANULOCYTES NFR BLD AUTO: 0.7 % (ref 0–0.5)
KETONES UR QL STRIP: NEGATIVE
LEUKOCYTE ESTERASE UR QL STRIP.AUTO: NEGATIVE
LIPASE SERPL-CCNC: 33 U/L (ref 13–60)
LYMPHOCYTES # BLD AUTO: 1.46 10*3/MM3 (ref 0.7–3.1)
LYMPHOCYTES # BLD AUTO: 1.57 10*3/MM3 (ref 0.7–3.1)
LYMPHOCYTES NFR BLD AUTO: 13.7 % (ref 19.6–45.3)
LYMPHOCYTES NFR BLD AUTO: 25.7 % (ref 19.6–45.3)
MCH RBC QN AUTO: 35 PG (ref 26.6–33)
MCH RBC QN AUTO: 35.5 PG (ref 26.6–33)
MCHC RBC AUTO-ENTMCNC: 34.2 G/DL (ref 31.5–35.7)
MCHC RBC AUTO-ENTMCNC: 34.6 G/DL (ref 31.5–35.7)
MCV RBC AUTO: 102.2 FL (ref 79–97)
MCV RBC AUTO: 102.6 FL (ref 79–97)
MONOCYTES # BLD AUTO: 0.49 10*3/MM3 (ref 0.1–0.9)
MONOCYTES # BLD AUTO: 0.64 10*3/MM3 (ref 0.1–0.9)
MONOCYTES NFR BLD AUTO: 5.6 % (ref 5–12)
MONOCYTES NFR BLD AUTO: 8.6 % (ref 5–12)
NEUTROPHILS NFR BLD AUTO: 2.82 10*3/MM3 (ref 1.7–7)
NEUTROPHILS NFR BLD AUTO: 49.6 % (ref 42.7–76)
NEUTROPHILS NFR BLD AUTO: 78.7 % (ref 42.7–76)
NEUTROPHILS NFR BLD AUTO: 8.99 10*3/MM3 (ref 1.7–7)
NITRITE UR QL STRIP: NEGATIVE
NOROVIRUS GI+II RNA STL QL NAA+NON-PROBE: NOT DETECTED
NRBC BLD AUTO-RTO: 0 /100 WBC (ref 0–0.2)
NRBC BLD AUTO-RTO: 0.5 /100 WBC (ref 0–0.2)
P SHIGELLOIDES DNA STL QL NAA+NON-PROBE: NOT DETECTED
PH UR STRIP.AUTO: <=5 [PH] (ref 5–8)
PLATELET # BLD AUTO: 271 10*3/MM3 (ref 140–450)
PLATELET # BLD AUTO: 320 10*3/MM3 (ref 140–450)
PMV BLD AUTO: 9.6 FL (ref 6–12)
PMV BLD AUTO: 9.7 FL (ref 6–12)
POTASSIUM SERPL-SCNC: 3.6 MMOL/L (ref 3.5–5.2)
POTASSIUM SERPL-SCNC: 3.7 MMOL/L (ref 3.5–5.2)
PROCALCITONIN SERPL-MCNC: 0.1 NG/ML (ref 0–0.25)
PROT SERPL-MCNC: 5.3 G/DL (ref 6–8.5)
PROT SERPL-MCNC: 6.6 G/DL (ref 6–8.5)
PROT UR QL STRIP: NEGATIVE
RBC # BLD AUTO: 3.89 10*6/MM3 (ref 3.77–5.28)
RBC # BLD AUTO: 4.63 10*6/MM3 (ref 3.77–5.28)
RVA RNA STL QL NAA+NON-PROBE: NOT DETECTED
S ENT+BONG DNA STL QL NAA+NON-PROBE: NOT DETECTED
SAPO I+II+IV+V RNA STL QL NAA+NON-PROBE: NOT DETECTED
SARS-COV-2 RNA PNL SPEC NAA+PROBE: NOT DETECTED
SHIGELLA SP+EIEC IPAH ST NAA+NON-PROBE: NOT DETECTED
SODIUM SERPL-SCNC: 143 MMOL/L (ref 136–145)
SODIUM SERPL-SCNC: 144 MMOL/L (ref 136–145)
SP GR UR STRIP: 1.07 (ref 1–1.03)
TROPONIN T SERPL-MCNC: <0.01 NG/ML (ref 0–0.03)
UROBILINOGEN UR QL STRIP: ABNORMAL
V CHOL+PARA+VUL DNA STL QL NAA+NON-PROBE: NOT DETECTED
V CHOLERAE DNA STL QL NAA+NON-PROBE: NOT DETECTED
WBC NRBC COR # BLD: 11.43 10*3/MM3 (ref 3.4–10.8)
WBC NRBC COR # BLD: 5.68 10*3/MM3 (ref 3.4–10.8)
Y ENTEROCOL DNA STL QL NAA+NON-PROBE: NOT DETECTED

## 2022-08-26 PROCEDURE — 88312 SPECIAL STAINS GROUP 1: CPT | Performed by: EMERGENCY MEDICINE

## 2022-08-26 PROCEDURE — 99219 PR INITIAL OBSERVATION CARE/DAY 50 MINUTES: CPT | Performed by: INTERNAL MEDICINE

## 2022-08-26 PROCEDURE — 94799 UNLISTED PULMONARY SVC/PX: CPT

## 2022-08-26 PROCEDURE — 80053 COMPREHEN METABOLIC PANEL: CPT | Performed by: PHYSICIAN ASSISTANT

## 2022-08-26 PROCEDURE — 93005 ELECTROCARDIOGRAM TRACING: CPT | Performed by: EMERGENCY MEDICINE

## 2022-08-26 PROCEDURE — 87040 BLOOD CULTURE FOR BACTERIA: CPT | Performed by: EMERGENCY MEDICINE

## 2022-08-26 PROCEDURE — 83605 ASSAY OF LACTIC ACID: CPT | Performed by: INTERNAL MEDICINE

## 2022-08-26 PROCEDURE — 85025 COMPLETE CBC W/AUTO DIFF WBC: CPT | Performed by: INTERNAL MEDICINE

## 2022-08-26 PROCEDURE — 25010000002 PROCHLORPERAZINE 10 MG/2ML SOLUTION: Performed by: EMERGENCY MEDICINE

## 2022-08-26 PROCEDURE — 82962 GLUCOSE BLOOD TEST: CPT

## 2022-08-26 PROCEDURE — 81003 URINALYSIS AUTO W/O SCOPE: CPT | Performed by: EMERGENCY MEDICINE

## 2022-08-26 PROCEDURE — 83605 ASSAY OF LACTIC ACID: CPT | Performed by: EMERGENCY MEDICINE

## 2022-08-26 PROCEDURE — 25010000002 PIPERACILLIN SOD-TAZOBACTAM PER 1 G: Performed by: PHYSICIAN ASSISTANT

## 2022-08-26 PROCEDURE — 87147 CULTURE TYPE IMMUNOLOGIC: CPT | Performed by: EMERGENCY MEDICINE

## 2022-08-26 PROCEDURE — 74174 CTA ABD&PLVS W/CONTRAST: CPT

## 2022-08-26 PROCEDURE — 87493 C DIFF AMPLIFIED PROBE: CPT | Performed by: EMERGENCY MEDICINE

## 2022-08-26 PROCEDURE — 96365 THER/PROPH/DIAG IV INF INIT: CPT

## 2022-08-26 PROCEDURE — 87150 DNA/RNA AMPLIFIED PROBE: CPT | Performed by: EMERGENCY MEDICINE

## 2022-08-26 PROCEDURE — 25010000002 MORPHINE PER 10 MG: Performed by: EMERGENCY MEDICINE

## 2022-08-26 PROCEDURE — 0 IOPAMIDOL PER 1 ML: Performed by: EMERGENCY MEDICINE

## 2022-08-26 PROCEDURE — 96376 TX/PRO/DX INJ SAME DRUG ADON: CPT

## 2022-08-26 PROCEDURE — 94640 AIRWAY INHALATION TREATMENT: CPT

## 2022-08-26 PROCEDURE — G0378 HOSPITAL OBSERVATION PER HR: HCPCS

## 2022-08-26 PROCEDURE — 87507 IADNA-DNA/RNA PROBE TQ 12-25: CPT | Performed by: INTERNAL MEDICINE

## 2022-08-26 PROCEDURE — 87636 SARSCOV2 & INF A&B AMP PRB: CPT | Performed by: PHYSICIAN ASSISTANT

## 2022-08-26 PROCEDURE — 25010000002 PIPERACILLIN SOD-TAZOBACTAM PER 1 G: Performed by: EMERGENCY MEDICINE

## 2022-08-26 PROCEDURE — 96375 TX/PRO/DX INJ NEW DRUG ADDON: CPT

## 2022-08-26 PROCEDURE — 25010000002 ONDANSETRON PER 1 MG: Performed by: EMERGENCY MEDICINE

## 2022-08-26 RX ORDER — LISINOPRIL 5 MG/1
5 TABLET ORAL DAILY
Status: DISCONTINUED | OUTPATIENT
Start: 2022-08-26 | End: 2022-08-26

## 2022-08-26 RX ORDER — ONDANSETRON 2 MG/ML
4 INJECTION INTRAMUSCULAR; INTRAVENOUS ONCE
Status: COMPLETED | OUTPATIENT
Start: 2022-08-26 | End: 2022-08-26

## 2022-08-26 RX ORDER — ISOSORBIDE MONONITRATE 60 MG/1
60 TABLET, EXTENDED RELEASE ORAL DAILY
Status: DISCONTINUED | OUTPATIENT
Start: 2022-08-26 | End: 2022-08-26

## 2022-08-26 RX ORDER — SODIUM CHLORIDE 0.9 % (FLUSH) 0.9 %
10 SYRINGE (ML) INJECTION EVERY 12 HOURS SCHEDULED
Status: DISCONTINUED | OUTPATIENT
Start: 2022-08-26 | End: 2022-08-28 | Stop reason: HOSPADM

## 2022-08-26 RX ORDER — ALBUTEROL SULFATE 2.5 MG/3ML
2.5 SOLUTION RESPIRATORY (INHALATION) EVERY 4 HOURS PRN
Status: DISCONTINUED | OUTPATIENT
Start: 2022-08-26 | End: 2022-08-28 | Stop reason: HOSPADM

## 2022-08-26 RX ORDER — PANTOPRAZOLE SODIUM 40 MG/10ML
40 INJECTION, POWDER, LYOPHILIZED, FOR SOLUTION INTRAVENOUS 2 TIMES DAILY
Status: DISCONTINUED | OUTPATIENT
Start: 2022-08-26 | End: 2022-08-28 | Stop reason: HOSPADM

## 2022-08-26 RX ORDER — CHLORTHALIDONE 25 MG/1
12.5 TABLET ORAL DAILY
Status: DISCONTINUED | OUTPATIENT
Start: 2022-08-26 | End: 2022-08-26

## 2022-08-26 RX ORDER — MONTELUKAST SODIUM 10 MG/1
10 TABLET ORAL DAILY
Status: DISCONTINUED | OUTPATIENT
Start: 2022-08-26 | End: 2022-08-28 | Stop reason: HOSPADM

## 2022-08-26 RX ORDER — SODIUM CHLORIDE 0.9 % (FLUSH) 0.9 %
10 SYRINGE (ML) INJECTION AS NEEDED
Status: DISCONTINUED | OUTPATIENT
Start: 2022-08-26 | End: 2022-08-28 | Stop reason: HOSPADM

## 2022-08-26 RX ORDER — SODIUM CHLORIDE, SODIUM LACTATE, POTASSIUM CHLORIDE, CALCIUM CHLORIDE 600; 310; 30; 20 MG/100ML; MG/100ML; MG/100ML; MG/100ML
75 INJECTION, SOLUTION INTRAVENOUS CONTINUOUS
Status: DISCONTINUED | OUTPATIENT
Start: 2022-08-26 | End: 2022-08-26

## 2022-08-26 RX ORDER — INSULIN LISPRO 100 [IU]/ML
0-7 INJECTION, SOLUTION INTRAVENOUS; SUBCUTANEOUS EVERY 6 HOURS SCHEDULED
Status: DISCONTINUED | OUTPATIENT
Start: 2022-08-26 | End: 2022-08-28 | Stop reason: HOSPADM

## 2022-08-26 RX ORDER — DEXTROSE MONOHYDRATE 25 G/50ML
25 INJECTION, SOLUTION INTRAVENOUS
Status: DISCONTINUED | OUTPATIENT
Start: 2022-08-26 | End: 2022-08-28 | Stop reason: HOSPADM

## 2022-08-26 RX ORDER — PANTOPRAZOLE SODIUM 40 MG/10ML
40 INJECTION, POWDER, LYOPHILIZED, FOR SOLUTION INTRAVENOUS
Status: DISCONTINUED | OUTPATIENT
Start: 2022-08-26 | End: 2022-08-26

## 2022-08-26 RX ORDER — BUDESONIDE AND FORMOTEROL FUMARATE DIHYDRATE 80; 4.5 UG/1; UG/1
2 AEROSOL RESPIRATORY (INHALATION)
Status: DISCONTINUED | OUTPATIENT
Start: 2022-08-26 | End: 2022-08-28 | Stop reason: HOSPADM

## 2022-08-26 RX ORDER — BACLOFEN 10 MG/1
10 TABLET ORAL 3 TIMES DAILY
Status: DISCONTINUED | OUTPATIENT
Start: 2022-08-26 | End: 2022-08-28 | Stop reason: HOSPADM

## 2022-08-26 RX ORDER — CHOLECALCIFEROL (VITAMIN D3) 125 MCG
5 CAPSULE ORAL NIGHTLY PRN
Status: DISCONTINUED | OUTPATIENT
Start: 2022-08-26 | End: 2022-08-28 | Stop reason: HOSPADM

## 2022-08-26 RX ORDER — ONDANSETRON 2 MG/ML
4 INJECTION INTRAMUSCULAR; INTRAVENOUS EVERY 6 HOURS PRN
Status: DISCONTINUED | OUTPATIENT
Start: 2022-08-26 | End: 2022-08-28 | Stop reason: HOSPADM

## 2022-08-26 RX ORDER — ONDANSETRON 2 MG/ML
4 INJECTION INTRAMUSCULAR; INTRAVENOUS EVERY 6 HOURS PRN
Status: DISCONTINUED | OUTPATIENT
Start: 2022-08-26 | End: 2022-08-26 | Stop reason: SDUPTHER

## 2022-08-26 RX ORDER — SODIUM CHLORIDE, SODIUM LACTATE, POTASSIUM CHLORIDE, CALCIUM CHLORIDE 600; 310; 30; 20 MG/100ML; MG/100ML; MG/100ML; MG/100ML
100 INJECTION, SOLUTION INTRAVENOUS CONTINUOUS
Status: ACTIVE | OUTPATIENT
Start: 2022-08-26 | End: 2022-08-27

## 2022-08-26 RX ORDER — NICOTINE POLACRILEX 4 MG
15 LOZENGE BUCCAL
Status: DISCONTINUED | OUTPATIENT
Start: 2022-08-26 | End: 2022-08-28 | Stop reason: HOSPADM

## 2022-08-26 RX ORDER — ACETAMINOPHEN 325 MG/1
650 TABLET ORAL EVERY 4 HOURS PRN
Status: DISCONTINUED | OUTPATIENT
Start: 2022-08-26 | End: 2022-08-28 | Stop reason: HOSPADM

## 2022-08-26 RX ORDER — TAMSULOSIN HYDROCHLORIDE 0.4 MG/1
0.4 CAPSULE ORAL DAILY
Status: DISCONTINUED | OUTPATIENT
Start: 2022-08-26 | End: 2022-08-28 | Stop reason: HOSPADM

## 2022-08-26 RX ORDER — ATORVASTATIN CALCIUM 40 MG/1
80 TABLET, FILM COATED ORAL DAILY
Status: DISCONTINUED | OUTPATIENT
Start: 2022-08-26 | End: 2022-08-28 | Stop reason: HOSPADM

## 2022-08-26 RX ADMIN — MORPHINE SULFATE 2 MG: 2 INJECTION, SOLUTION INTRAMUSCULAR; INTRAVENOUS at 00:12

## 2022-08-26 RX ADMIN — TAZOBACTAM SODIUM AND PIPERACILLIN SODIUM 3.38 G: 375; 3 INJECTION, SOLUTION INTRAVENOUS at 16:55

## 2022-08-26 RX ADMIN — BUDESONIDE AND FORMOTEROL FUMARATE DIHYDRATE 2 PUFF: 80; 4.5 AEROSOL RESPIRATORY (INHALATION) at 10:11

## 2022-08-26 RX ADMIN — SODIUM CHLORIDE, POTASSIUM CHLORIDE, SODIUM LACTATE AND CALCIUM CHLORIDE 100 ML/HR: 600; 310; 30; 20 INJECTION, SOLUTION INTRAVENOUS at 08:27

## 2022-08-26 RX ADMIN — Medication 5 MG: at 20:14

## 2022-08-26 RX ADMIN — BUDESONIDE AND FORMOTEROL FUMARATE DIHYDRATE 2 PUFF: 80; 4.5 AEROSOL RESPIRATORY (INHALATION) at 19:22

## 2022-08-26 RX ADMIN — TAMSULOSIN HYDROCHLORIDE 0.4 MG: 0.4 CAPSULE ORAL at 10:56

## 2022-08-26 RX ADMIN — PANTOPRAZOLE SODIUM 40 MG: 40 INJECTION, POWDER, FOR SOLUTION INTRAVENOUS at 17:00

## 2022-08-26 RX ADMIN — TAZOBACTAM SODIUM AND PIPERACILLIN SODIUM 3.38 G: 375; 3 INJECTION, SOLUTION INTRAVENOUS at 08:26

## 2022-08-26 RX ADMIN — SODIUM CHLORIDE 1000 ML: 9 INJECTION, SOLUTION INTRAVENOUS at 03:52

## 2022-08-26 RX ADMIN — METOPROLOL TARTRATE 25 MG: 25 TABLET, FILM COATED ORAL at 10:56

## 2022-08-26 RX ADMIN — BACLOFEN 10 MG: 10 TABLET ORAL at 10:55

## 2022-08-26 RX ADMIN — BACLOFEN 10 MG: 10 TABLET ORAL at 16:55

## 2022-08-26 RX ADMIN — IOPAMIDOL 100 ML: 755 INJECTION, SOLUTION INTRAVENOUS at 01:17

## 2022-08-26 RX ADMIN — LISINOPRIL 5 MG: 5 TABLET ORAL at 10:55

## 2022-08-26 RX ADMIN — PROCHLORPERAZINE EDISYLATE 5 MG: 5 INJECTION INTRAMUSCULAR; INTRAVENOUS at 00:12

## 2022-08-26 RX ADMIN — ONDANSETRON 4 MG: 2 INJECTION INTRAMUSCULAR; INTRAVENOUS at 03:52

## 2022-08-26 RX ADMIN — ATORVASTATIN CALCIUM 80 MG: 40 TABLET, FILM COATED ORAL at 10:55

## 2022-08-26 RX ADMIN — TAZOBACTAM SODIUM AND PIPERACILLIN SODIUM 3.38 G: 375; 3 INJECTION, SOLUTION INTRAVENOUS at 02:34

## 2022-08-26 RX ADMIN — ISOSORBIDE MONONITRATE 60 MG: 60 TABLET, EXTENDED RELEASE ORAL at 10:55

## 2022-08-26 RX ADMIN — METOPROLOL TARTRATE 25 MG: 25 TABLET, FILM COATED ORAL at 20:14

## 2022-08-26 RX ADMIN — PANTOPRAZOLE SODIUM 40 MG: 40 INJECTION, POWDER, FOR SOLUTION INTRAVENOUS at 03:52

## 2022-08-26 RX ADMIN — BACLOFEN 10 MG: 10 TABLET ORAL at 20:14

## 2022-08-26 RX ADMIN — SODIUM CHLORIDE, POTASSIUM CHLORIDE, SODIUM LACTATE AND CALCIUM CHLORIDE 75 ML/HR: 600; 310; 30; 20 INJECTION, SOLUTION INTRAVENOUS at 06:52

## 2022-08-26 RX ADMIN — MONTELUKAST 10 MG: 10 TABLET, FILM COATED ORAL at 17:00

## 2022-08-26 RX ADMIN — Medication 10 ML: at 08:26

## 2022-08-27 LAB
ANION GAP SERPL CALCULATED.3IONS-SCNC: 11 MMOL/L (ref 5–15)
BACTERIA BLD CULT: ABNORMAL
BOTTLE TYPE: ABNORMAL
BUN SERPL-MCNC: 13 MG/DL (ref 8–23)
BUN/CREAT SERPL: 17.8 (ref 7–25)
CALCIUM SPEC-SCNC: 9.4 MG/DL (ref 8.6–10.5)
CHLORIDE SERPL-SCNC: 106 MMOL/L (ref 98–107)
CO2 SERPL-SCNC: 28 MMOL/L (ref 22–29)
CREAT SERPL-MCNC: 0.73 MG/DL (ref 0.57–1)
DEPRECATED RDW RBC AUTO: 48 FL (ref 37–54)
EGFRCR SERPLBLD CKD-EPI 2021: 94.3 ML/MIN/1.73
ERYTHROCYTE [DISTWIDTH] IN BLOOD BY AUTOMATED COUNT: 12.9 % (ref 12.3–15.4)
GLUCOSE BLDC GLUCOMTR-MCNC: 117 MG/DL (ref 70–130)
GLUCOSE BLDC GLUCOMTR-MCNC: 119 MG/DL (ref 70–130)
GLUCOSE BLDC GLUCOMTR-MCNC: 126 MG/DL (ref 70–130)
GLUCOSE BLDC GLUCOMTR-MCNC: 92 MG/DL (ref 70–130)
GLUCOSE SERPL-MCNC: 111 MG/DL (ref 65–99)
HCT VFR BLD AUTO: 37.8 % (ref 34–46.6)
HGB BLD-MCNC: 13 G/DL (ref 12–15.9)
MCH RBC QN AUTO: 35.4 PG (ref 26.6–33)
MCHC RBC AUTO-ENTMCNC: 34.4 G/DL (ref 31.5–35.7)
MCV RBC AUTO: 103 FL (ref 79–97)
PLATELET # BLD AUTO: 270 10*3/MM3 (ref 140–450)
PMV BLD AUTO: 9.4 FL (ref 6–12)
POTASSIUM SERPL-SCNC: 4 MMOL/L (ref 3.5–5.2)
RBC # BLD AUTO: 3.67 10*6/MM3 (ref 3.77–5.28)
SODIUM SERPL-SCNC: 145 MMOL/L (ref 136–145)
WBC NRBC COR # BLD: 7.8 10*3/MM3 (ref 3.4–10.8)

## 2022-08-27 PROCEDURE — 82962 GLUCOSE BLOOD TEST: CPT

## 2022-08-27 PROCEDURE — 80048 BASIC METABOLIC PNL TOTAL CA: CPT | Performed by: SURGERY

## 2022-08-27 PROCEDURE — G0378 HOSPITAL OBSERVATION PER HR: HCPCS

## 2022-08-27 PROCEDURE — 85027 COMPLETE CBC AUTOMATED: CPT | Performed by: SURGERY

## 2022-08-27 PROCEDURE — 25010000002 PIPERACILLIN SOD-TAZOBACTAM PER 1 G: Performed by: PHYSICIAN ASSISTANT

## 2022-08-27 PROCEDURE — 99225 PR SBSQ OBSERVATION CARE/DAY 25 MINUTES: CPT | Performed by: INTERNAL MEDICINE

## 2022-08-27 PROCEDURE — 94799 UNLISTED PULMONARY SVC/PX: CPT

## 2022-08-27 PROCEDURE — 96376 TX/PRO/DX INJ SAME DRUG ADON: CPT

## 2022-08-27 RX ADMIN — METOPROLOL TARTRATE 25 MG: 25 TABLET, FILM COATED ORAL at 09:22

## 2022-08-27 RX ADMIN — BACLOFEN 10 MG: 10 TABLET ORAL at 20:11

## 2022-08-27 RX ADMIN — Medication 5 MG: at 20:53

## 2022-08-27 RX ADMIN — TAZOBACTAM SODIUM AND PIPERACILLIN SODIUM 3.38 G: 375; 3 INJECTION, SOLUTION INTRAVENOUS at 09:22

## 2022-08-27 RX ADMIN — ATORVASTATIN CALCIUM 80 MG: 40 TABLET, FILM COATED ORAL at 09:22

## 2022-08-27 RX ADMIN — MONTELUKAST 10 MG: 10 TABLET, FILM COATED ORAL at 16:40

## 2022-08-27 RX ADMIN — Medication 10 ML: at 09:22

## 2022-08-27 RX ADMIN — PANTOPRAZOLE SODIUM 40 MG: 40 INJECTION, POWDER, FOR SOLUTION INTRAVENOUS at 16:39

## 2022-08-27 RX ADMIN — PANTOPRAZOLE SODIUM 40 MG: 40 INJECTION, POWDER, FOR SOLUTION INTRAVENOUS at 06:30

## 2022-08-27 RX ADMIN — BACLOFEN 10 MG: 10 TABLET ORAL at 16:40

## 2022-08-27 RX ADMIN — TAMSULOSIN HYDROCHLORIDE 0.4 MG: 0.4 CAPSULE ORAL at 09:22

## 2022-08-27 RX ADMIN — BACLOFEN 10 MG: 10 TABLET ORAL at 09:22

## 2022-08-27 RX ADMIN — METOPROLOL TARTRATE 25 MG: 25 TABLET, FILM COATED ORAL at 20:11

## 2022-08-27 RX ADMIN — BUDESONIDE AND FORMOTEROL FUMARATE DIHYDRATE 2 PUFF: 80; 4.5 AEROSOL RESPIRATORY (INHALATION) at 19:09

## 2022-08-27 RX ADMIN — TAZOBACTAM SODIUM AND PIPERACILLIN SODIUM 3.38 G: 375; 3 INJECTION, SOLUTION INTRAVENOUS at 16:40

## 2022-08-27 RX ADMIN — TAZOBACTAM SODIUM AND PIPERACILLIN SODIUM 3.38 G: 375; 3 INJECTION, SOLUTION INTRAVENOUS at 00:36

## 2022-08-28 VITALS
HEART RATE: 78 BPM | BODY MASS INDEX: 33.15 KG/M2 | RESPIRATION RATE: 18 BRPM | WEIGHT: 199 LBS | OXYGEN SATURATION: 95 % | TEMPERATURE: 97.8 F | SYSTOLIC BLOOD PRESSURE: 159 MMHG | DIASTOLIC BLOOD PRESSURE: 91 MMHG | HEIGHT: 65 IN

## 2022-08-28 LAB
BACTERIA SPEC AEROBE CULT: ABNORMAL
GLUCOSE BLDC GLUCOMTR-MCNC: 115 MG/DL (ref 70–130)
GLUCOSE BLDC GLUCOMTR-MCNC: 127 MG/DL (ref 70–130)
GLUCOSE BLDC GLUCOMTR-MCNC: 136 MG/DL (ref 70–130)
GRAM STN SPEC: ABNORMAL
ISOLATED FROM: ABNORMAL

## 2022-08-28 PROCEDURE — 99217 PR OBSERVATION CARE DISCHARGE MANAGEMENT: CPT | Performed by: INTERNAL MEDICINE

## 2022-08-28 PROCEDURE — 82962 GLUCOSE BLOOD TEST: CPT

## 2022-08-28 PROCEDURE — 96376 TX/PRO/DX INJ SAME DRUG ADON: CPT

## 2022-08-28 PROCEDURE — 25010000002 PIPERACILLIN SOD-TAZOBACTAM PER 1 G: Performed by: PHYSICIAN ASSISTANT

## 2022-08-28 PROCEDURE — 94799 UNLISTED PULMONARY SVC/PX: CPT

## 2022-08-28 PROCEDURE — G0378 HOSPITAL OBSERVATION PER HR: HCPCS

## 2022-08-28 PROCEDURE — 96366 THER/PROPH/DIAG IV INF ADDON: CPT

## 2022-08-28 PROCEDURE — 94664 DEMO&/EVAL PT USE INHALER: CPT

## 2022-08-28 RX ORDER — LOPERAMIDE HYDROCHLORIDE 2 MG/1
2 CAPSULE ORAL 4 TIMES DAILY PRN
Status: DISCONTINUED | OUTPATIENT
Start: 2022-08-28 | End: 2022-08-28 | Stop reason: HOSPADM

## 2022-08-28 RX ADMIN — BUDESONIDE AND FORMOTEROL FUMARATE DIHYDRATE 2 PUFF: 80; 4.5 AEROSOL RESPIRATORY (INHALATION) at 09:54

## 2022-08-28 RX ADMIN — PANTOPRAZOLE SODIUM 40 MG: 40 INJECTION, POWDER, FOR SOLUTION INTRAVENOUS at 06:36

## 2022-08-28 RX ADMIN — METOPROLOL TARTRATE 25 MG: 25 TABLET, FILM COATED ORAL at 10:08

## 2022-08-28 RX ADMIN — Medication 10 ML: at 10:09

## 2022-08-28 RX ADMIN — ATORVASTATIN CALCIUM 80 MG: 40 TABLET, FILM COATED ORAL at 10:09

## 2022-08-28 RX ADMIN — TAZOBACTAM SODIUM AND PIPERACILLIN SODIUM 3.38 G: 375; 3 INJECTION, SOLUTION INTRAVENOUS at 10:09

## 2022-08-28 RX ADMIN — TAZOBACTAM SODIUM AND PIPERACILLIN SODIUM 3.38 G: 375; 3 INJECTION, SOLUTION INTRAVENOUS at 00:10

## 2022-08-28 RX ADMIN — BACLOFEN 10 MG: 10 TABLET ORAL at 10:09

## 2022-08-28 RX ADMIN — ACETAMINOPHEN 650 MG: 325 TABLET, FILM COATED ORAL at 10:12

## 2022-08-28 RX ADMIN — TAMSULOSIN HYDROCHLORIDE 0.4 MG: 0.4 CAPSULE ORAL at 10:09

## 2022-08-31 LAB — BACTERIA SPEC AEROBE CULT: NORMAL

## 2022-09-01 LAB
QT INTERVAL: 348 MS
QTC INTERVAL: 451 MS

## 2022-09-13 NOTE — DISCHARGE SUMMARY
[facility] Medicine Services  DISCHARGE SUMMARY    Patient Name: Bri Boyce  : 1960  MRN: [id number]    Date of Admission: 2022 10:19 PM  Date of Discharge: 22  Primary Care Physician: Romulo Costa APRN    Consults     Date and Time Order Name Status Description    2022  6:19 AM Inpatient General Surgery Consult Completed           Hospital Course     Presenting Problem:   Acute superficial gastritis without hemorrhage [K29.00]    Active Hospital Problems    Diagnosis  POA   • **Acute superficial gastritis without hemorrhage [K29.00]  Yes   • Nausea vomiting and diarrhea [R11.2, R19.7]  Yes   • Lactic acidosis [E87.2]  Yes   • Type 2 diabetes mellitus (HCC) [E11.9]  Yes   • Hypertension [I10]  Yes   • Hypercholesterolemia [E78.00]  Yes   • CAD (coronary artery disease) [I25.10]  Yes      Resolved Hospital Problems   No resolved problems to display.          Hospital Course:  Bri Boyce is a 62 y.o. female with past medical history significant for diabetes mellitus, hypertension, hyperlipidemia, coronary artery disease was admitted for nausea vomiting diarrhea and abdominal pain.  CT scan of abdomen pelvis was significant for air in portal vein.     *Nausea, vomiting, diarrhea, abdominal pain.  Etiology is uncertain, however, patient is on on Ozempic for her diabetes which can cause all the above symptoms.  CT scan of the abdomen and pelvis also showed air in the portal venous system.  Surgery has seen and evaluated the patient.  Patient's symptoms have markedly improved since admission. Vomiting has subsided, no abdominal pain.  Mild nausea lingered longer however today patient had no nausea.     *Diabetes mellitus, hemoglobin A1c is below 6.  Patient is on Ozempic.  About 3 weeks ago she injected a very high dose by mistake.  It seems that the symptoms have started following that event.  I have discussed the possibility of symptoms  being caused by Ozempic with the patient in detail.     *Hypertension; patient was on Imdur, chlorthalidone, lisinopril 5 mg p.o. daily, metoprolol 25 mg p.o. twice daily.  Currently blood pressure is acceptable although patient is only on metoprolol and chlorthalidone. Will add the rest of home antihypertensives at discharge.     *Coronary artery disease, currently no symptoms.     *Cervical DDD, status post rhizotomies C3, C4, C5, which was done on 8/1/2022 by Dr. Whitten.         Discharge Follow Up Recommendations for outpatient labs/diagnostics:      Day of Discharge     HPI:   Of seeing the patient a few times today.  Earlier in the morning patient still was not feeling well enough to go home.  Gradually during the day patient felt much better and in the afternoon she was eager to go home.  At that time she had no specific complaint.  No fever or chills.  No chest pain or palpitation or shortness of breath.  No nausea or vomiting with diarrhea or any abdominal pain.    Review of Systems  As above    Vital Signs:          Physical Exam:  Constitutional: No acute distress, looks comfortable  HENT: NCAT, mucous membranes moist  Respiratory: Clear to auscultation bilaterally, respiratory effort normal   Cardiovascular: RRR, no murmurs, rubs, or gallops  Gastrointestinal: Abdomen is obese.  Positive bowel sounds, soft, nontender, nondistended  Musculoskeletal: No bilateral ankle edema, severely obese  Psychiatric: Appropriate affect, cooperative  Neurologic: Awake, alert, oriented x3, no focality appreciated, speech clear  Skin: No rashes    Pertinent  and/or Most Recent Results     LAB RESULTS:                              Brief Urine Lab Results  (Last result in the past 365 days)      Color   Clarity   Blood   Leuk Est   Nitrite   Protein   CREAT   Urine HCG        08/26/22 0349 Yellow   Clear   Negative   Negative   Negative   Negative               Microbiology Results (last 10 days)     ** No results found  for the last 240 hours. **          No radiology results for the last 10 days            Results for orders placed during the hospital encounter of 06/30/20    Adult Transthoracic Echo Complete W/ Cont if Necessary Per Protocol    Interpretation Summary  · Left ventricular systolic function is normal.  · Estimated EF appears to be in the range of 56 - 60%.  · Left ventricular diastolic dysfunction (grade II) consistent with pseudonormalization.          Discharge Details        Discharge Medications      Continue These Medications      Instructions Start Date   albuterol sulfate  (90 Base) MCG/ACT inhaler  Commonly known as: PROVENTIL HFA;VENTOLIN HFA;PROAIR HFA   2 puffs, Inhalation, Every 4 Hours PRN      aspirin 81 MG tablet   81 mg, Oral, Daily      atorvastatin 80 MG tablet  Commonly known as: LIPITOR   80 mg, Oral, Daily      baclofen 10 MG tablet  Commonly known as: LIORESAL   10 mg, Oral, 3 Times Daily, Start 1/2 tab QHS, then 1/2 tab TID PRN muscle spasms, if tolerated, 1 tab TID PRN        chlorthalidone 25 MG tablet  Commonly known as: HYGROTON   TAKE 1/2 TABLET BY MOUTH DAILY      lisinopril 5 MG tablet  Commonly known as: PRINIVIL,ZESTRIL   5 mg, Oral, Daily      meloxicam 7.5 MG tablet  Commonly known as: MOBIC   7.5 mg, Oral, 2 Times Daily PRN      metoprolol tartrate 25 MG tablet  Commonly known as: LOPRESSOR   TAKE 1 TABLET BY MOUTH TWICE DAILY      metoprolol tartrate 25 MG tablet  Commonly known as: LOPRESSOR   25 mg, Oral, Every 8 Hours PRN      montelukast 10 MG tablet  Commonly known as: SINGULAIR   10 mg, Oral, Daily      Ozempic (0.25 or 0.5 MG/DOSE) 2 MG/1.5ML solution pen-injector  Generic drug: Semaglutide(0.25 or 0.5MG/DOS)   INJECT 0.5MG ONCE WEEKLY AS DIRECTED      tamsulosin 0.4 MG capsule 24 hr capsule  Commonly known as: FLOMAX   1 capsule, Oral, Daily      TYLENOL PM EXTRA STRENGTH PO   1 tablet, Oral, As Needed      Wixela Inhub 100-50 MCG/ACT DISKUS  Generic drug:  Fluticasone-Salmeterol   1 puff, Inhalation, 2 Times Daily         Stop These Medications    isosorbide mononitrate 60 MG 24 hr tablet  Commonly known as: IMDUR            Allergies   Allergen Reactions   • Percocet [Oxycodone-Acetaminophen] Nausea And Vomiting         Discharge Disposition:  Home or Self Care    Diet:  Hospital: Regular, cardiac, consistent carbohydrate.      Activity:  Activity Instructions     Activity as Tolerated                   CODE STATUS:    Code Status and Medical Interventions:   Ordered at: 08/26/22 0347     Code Status (Patient has no pulse and is not breathing):    CPR (Attempt to Resuscitate)     Medical Interventions (Patient has pulse or is breathing):    Full Support     Release to patient:    Routine Release       Future Appointments   Date Time Provider Department Center   9/19/2022 10:00 AM Marti Abdullahi APRN [facility]   10/11/2022 11:00 AM Marti Abdullahi APRN [facility]   8/8/2023  3:45 PM Sven Escamilla III, MD [facility]       Additional Instructions for the Follow-ups that You Need to Schedule     Discharge Follow-up with PCP   As directed       Currently Documented PCP:    Romulo Costa APRN    PCP Phone Number:    [phone]     Follow Up Details: within one week                     Hung Barton MD  09/13/22      Time Spent on Discharge:  I spent 40  minutes on this discharge activity which included: face-to-face encounter with the patient, reviewing the data in the system, coordination of the care with the nursing staff as well as consultants, documentation, and entering orders.

## 2022-09-15 NOTE — PROGRESS NOTES
"Chief complaint: \"Low back, right gluteal and right thigh pain\"      History of present illness: Mrs. Bri Boyce is a 62 y.o. female, who returns to the clinic for evaluation of recurrent chronic neck pain and lower back pain.  She was last seen on August 1, 2022, when she underwent bilateral cervical medial branch rhizotomies at C3, C4, and C5, from which she reports experiencing greater than 50% pain relief and functional improvement that is ongoing.  She continues learned home exercises from previous physical therapy.  She is also having some recent lower back pain, with radiation into her right gluteal and right posterior thigh.  She tells me she has been seen by orthopedics and spine, Dr. Brandon Melendez, and has recently underwent a lumbar medial branch block, as well as a lumbar epidural steroid injection from which she tells me did not provide her with any benefit.  She also gets right hip injections every 3 months per orthopedics.    Problem #1: Neck Pain  Pain History: Ms. Bri Boyce, 62 y.o. female originally referred by Dr. Tay Ayala in consultation for chronic intractable neck pain.  Patient reports a now 3 year history of neck pain, which began without incident.   Pain Description: constant pain with intermittent exacerbation, described as sharp, aching, and \"on fire\" sensation.   Radiation of Pain: The pain radiates into the bilateral occipital region and into the bilateral shoulders.   Pain intensity today: 3/10  Average pain intensity last week: 5/10  Pain intensity ranges from: 3/10 to 7/10  Aggravating factors: Pain increases with extension and rotation of the cervical spine   Alleviating factors: Pain decreases with massage and lying down   Associated Symptoms:   Patient denies pain, numbness, or weakness in the upper extremities.   Patient denies any new bladder or bowel problems.   Patient denies difficulties with her balance or recent falls.   Patient reports bilateral " occipital headaches lasting occurring 1-2 times per month.      Problem #2: Lower Back Pain  Pain History: Patient reports a 1-year history of pain, which began without incident. She tells me she has been seen by orthopedics and spine, Dr. Brandon Melendez, and has recently underwent a lumbar medial branch block, as well as a lumbar epidural steroid injection from which she tells me did not provide her with any of her symptoms.  Patient has failed to obtain pain relief with conservative measures for more than 3 months including oral analgesics, physical therapy, previous injections, to name a few. Pain has progressed in intensity over the past several months.   Pain Description: constant pain with intermittent exacerbation, described as aching sensation.   Radiation of Pain:  The pain radiates from the lower back into the right gluteal, right hip, and right posterior thigh.  Pain intensity today: 5/10  Average pain intensity last week: 6/10  Pain intensity ranges from: 4/10 to 7/10  Aggravating factors: Pain increases with standing, walking (less than five minutes), climbing stairs, sitting. Patient describes neurogenic claudication.  Patient reports limitations and general mobility deficits   Alleviating factors: Pain decreases with sitting with legs crossed.     Associated Symptoms:   Patient reports pain and weakness in the right thigh.  She denies numbness.      Review of previous therapies and additional medical records:  Bri Boyce has already failed the following measures, including:   Conservative measures: oral analgesics, massage, physical therapy, chiropractic therapy and acupuncture   Interventional: 08/31/2015: Bilateral cervical RFA  3/4/2020: Bilateral cervical RFA  08/01/2022: Bilateral cervical RFA  Procedures with [facility]:  4/1/2022: Right L2-L3 TFESI   05/23/2022: Right Lumbar MBB at L3-L5  SI joint injections (?)  Right hip injections every 3 months?  Surgical: No  previous history of cervical spine or shoulder surgery.  No history of lower back or hip surgery.  Bri Boyce underwent neurosurgical  consultation with Dr. Tay Ayala on 6/12/2015 and on 10/10/2020, and was found not to be a surgical candidate.   Bri Boyce presents with significant comorbidities including hypertension, hypercholesterolemia, moderate obesity, type 2 diabetes  In terms of current analgesics, Bri Boyce takes: None  I have reviewed her Ankush Report is consistent with medication reconciliation.  SOAPP: Low Risk    Global Pain Scale 02-04  2020 06-20 2022 09-19 2022        Pain 10 12 14        Feelings 3 0 0        Clinical outcomes 3 0 1        Activities 0 0 2        GPS Total: 16 12 17            Review of New Diagnostic Studies:   Cervical spine x-rays with flexion-extension views 6/20/2022: Imaging was reviewed.  No evidence of subluxation or instability.  Degenerative disc disease with endplate spurring most significant at C5-C6 and C6-C7.  Lumbar spine x-rays with flexion-extension views 6/20/2022: No evidence of subluxation or spondylolisthesis.  No evidence of instability.  Mild to moderate degenerative disc disease with facet arthritis.  Most significant at L5-S1.  Bilateral hip x-rays 6/20/2022: Imaging was reviewed.  Mild osteoarthritis seen within the hips and sacroiliac joints.    Diagnostic Studies:   MRI of the cervical spine without contrast 3/14/2022: Per radiology report.  Mild reversal of the normal cervical lordosis.  There are degenerative endplate changes most significant at C5-C6.  There are high T2 signal changes noted within the amy, which can be seen with small vessel ischemic related changes and demyelinating processes.  MRI of the brain could be obtained.  C2-C3: No significant spinal canal or neural foraminal stenosis.  C3-C4: Disc osteophyte complex formation that contacts and mildly effaces the ventral cord surface with mild central  spinal canal stenosis.  C4-C5: Small disc osteophyte complex formation that contacts in minimally effaces the ventral cord surface there is mild left neural foraminal narrowing and minimal right neural foraminal narrowing.  C5-C6: Disc osteophyte complex with contact and effacement of the ventral cord surface.  Mild central spinal canal stenosis with moderate left and moderate right neural foraminal stenosis.  C6-C7: Small disc osteophyte complex formation with mild neuroforaminal stenosis, greater on the right.  There is minimal spinal canal stenosis.  C7-T1: Small disc osteophyte complex formation without significant spinal canal or neural foraminal stenosis.  MRI of the brain with and without contrast 6/8/2022: Per radiology report.  No evidence of acute ischemia or intracranial hemorrhage.  There are no abnormal enhancing mass lesions.  The pituitary gland images are normal.  The cerebellar tonsils are in the normal location.  There are flair signal changes noted within the cerebral white matter and in the amy, these are nonspecific signal changes and can be seen with small vessel ischemic disease as well as demyelinating processes among other etiologies a few of these lesions raise suspicion for possible demyelinating process such as multiple sclerosis.  MRI of the lumbar spine without contrast 3/13/2022, per radiology report.  T12-L1: No significant spinal canal or neuroforaminal stenosis.  L1-L2: Small disc bulges with no significant spinal canal or neural foraminal stenosis.  L2-L3: Disc bulge with a small disc extrusion that extends superiorly along the inferior endplate of L2.  No significant spinal canal or neuroforaminal stenosis.  L3-4: Small disc bulge with mild facet arthritis without significant spinal canal or neuroforaminal stenosis.  L4-L5: Mild facet arthritis without significant spinal canal or neuroforaminal stenosis.  L5-S1: Small disc bulge with a small posterior central disc protrusion with  mild facet arthritis.  No significant spinal canal or neuroforaminal stenosis.      Review of Systems   Gastrointestinal: Positive for abdominal pain.   Musculoskeletal: Positive for arthralgias, back pain and myalgias.   Hematological: Bruises/bleeds easily.   All other systems reviewed and are negative.       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    • Occipital neuralgia   • DDD (degenerative disc disease), cervical   • Cervical spondylosis without myelopathy   • Neck pain   • Shoulder pain   • Back pain   • Nausea vomiting and diarrhea   • Lactic acidosis   • Acute superficial gastritis without hemorrhage   • Type 2 diabetes mellitus (HCC)       Past Medical History:   Diagnosis Date   • Abnormal heart rhythm 10/14/2016   • Arthritis 10/14/2016   • CAD (coronary artery disease) 10/14/2016    Non-obstructive CAD   • Chest pain 10/14/2016   • Heart disease    • Hypercholesterolemia 10/14/2016   • Hypertension 10/14/2016   • Pneumonia 12/2019         Past Surgical History:   Procedure Laterality Date   • OTHER SURGICAL HISTORY      CYST REMOVAL BREAST   • OVARIAN CYST SURGERY     • TUBAL ABDOMINAL LIGATION           Family History   Problem Relation Age of Onset   • Other Other         BLOOD CLOTS   • Cancer Other    • Heart disease Other    • Diabetes Other    • Arthritis Other    • Stroke Mother    • Diabetes Mother    • Heart disease Father    • No Known Problems Sister    • No Known Problems Brother    • Cancer Maternal Grandmother    • No Known Problems Sister          Social History     Socioeconomic History   • Marital status:    Tobacco Use   • Smoking status: Former Smoker      Packs/day: 1.00     Types: Cigarettes     Quit date:      Years since quittin.7   • Smokeless tobacco: Never Used   Vaping Use   • Vaping Use: Never used   Substance and Sexual Activity   • Alcohol use: No   • Drug use: No   • Sexual activity: Defer           Current Outpatient Medications:   •  albuterol sulfate  (90 Base) MCG/ACT inhaler, Inhale 2 puffs Every 4 (Four) Hours As Needed for Wheezing., Disp: , Rfl:   •  aspirin 81 MG tablet, Take 81 mg by mouth Daily., Disp: , Rfl:   •  atorvastatin (LIPITOR) 80 MG tablet, Take 80 mg by mouth Daily., Disp: , Rfl:   •  baclofen (LIORESAL) 10 MG tablet, Take 1 tablet by mouth 3 (Three) Times a Day. Start 1/2 tab QHS, then 1/2 tab TID PRN muscle spasms, if tolerated, 1 tab TID PRN, Disp: 90 tablet, Rfl: 0  •  chlorthalidone (HYGROTON) 25 MG tablet, TAKE 1/2 TABLET BY MOUTH DAILY, Disp: 45 tablet, Rfl: 0  •  Diphenhydramine-APAP, sleep, (TYLENOL PM EXTRA STRENGTH PO), Take 1 tablet by mouth As Needed., Disp: , Rfl:   •  lisinopril (PRINIVIL,ZESTRIL) 5 MG tablet, Take 5 mg by mouth Daily., Disp: , Rfl:   •  meloxicam (MOBIC) 7.5 MG tablet, Take 1 tablet by mouth 2 (Two) Times a Day As Needed for Moderate Pain ., Disp: 60 tablet, Rfl: 0  •  metoprolol tartrate (LOPRESSOR) 25 MG tablet, TAKE 1 TABLET BY MOUTH TWICE DAILY, Disp: 180 tablet, Rfl: 1  •  montelukast (SINGULAIR) 10 MG tablet, Take 10 mg by mouth Daily., Disp: , Rfl:   •  tamsulosin (FLOMAX) 0.4 MG capsule 24 hr capsule, Take 1 capsule by mouth Daily., Disp: , Rfl:   •  Wixela Inhub 100-50 MCG/ACT DISKUS, Inhale 1 puff 2 (Two) Times a Day., Disp: , Rfl:   •  metoprolol tartrate (LOPRESSOR) 25 MG tablet, Take 1 tablet by mouth Every 8 (Eight) Hours As Needed (palpitations)., Disp: 30 tablet, Rfl: 3  •  Ozempic, 0.25 or 0.5 MG/DOSE, 2 MG/1.5ML solution pen-injector, INJECT 0.5MG ONCE WEEKLY AS DIRECTED, Disp: , Rfl:       Allergies   Allergen Reactions   • Percocet [Oxycodone-Acetaminophen] Nausea  "And Vomiting         The following portions of the patient's history were reviewed and updated as appropriate: problem list, past medical history, past surgery history, social history, family history, medications, and allergies     /78   Pulse 67   Temp 95.7 °F (35.4 °C)   Ht 165.1 cm (65\")   Wt 88.5 kg (195 lb 3.2 oz)   SpO2 97%   BMI 32.48 kg/m²       Physical Exam   Constitutional: Patient appears, well-developed, well-nourished, well-hydrated  HEENT: Head: Normocephalic and atraumatic  Eyes: Conjunctivae and lids are normal  Pupils: Equal, round, reactive to light  Neck: Trachea normal. Neck supple. No JVD present.   Pulmonary: No increased work of breathing or signs of respiratory distress.   Musculoskeletal   Gait and station: Gait evaluation demonstrated a normal gait   Lumbar spine: Passive and active range of motion are almost full and without significant pain. Extension and flexion of the lumbar spine increased and reproduced some pain. Lumbar facet joint loading maneuvers are equivocal.  Héctor test, Gaenslen's test, SI compression test, Yeoman's test positive on the right.   Piriformis maneuvers are negative.    Palpation of the bilateral ischial tuberosities, unrevealing.    Palpation of the bilateral greater trochanter, reveals tenderness bilaterally.    Examination of the Iliotibial band: unrevealing.    Hip joints: The range of motion of the hip joints is full and without pain on the left, limited to internal rotation on the right.  Neurological:   Patient is alert and oriented to person, place, and time.   Speech: Normal.   Cortical function: Normal mental status.   Cranial nerves 2-12: intact.   Reflex Scores:  Right brachioradialis: 2+  Left brachioradialis: 2+  Right biceps: 2+  Left biceps: 2+  Right triceps: 2+  Left triceps: 2+  Right patellar: 1+  Left patellar: 1+  Right Achilles: 1+  Left Achilles: 1+  Motor strength: 5/5  Motor Tone: Normal  Involuntary movements: None. "   Superficial/Primitive Reflexes: Primitive reflexes were absent.   Right Deleon: Absent  Left Deleon: Absent  Right ankle clonus: Absent  Left ankle clonus: Absent   Spurling sign: Negative. Neck tornado test: Negative. Lhermitte sign: Negative. Long tract signs: Negative.  Straight leg raising test: Negative.  Femoral stretch sign: Negative  Sensory exam: Intact to light touch, intact pain and temperature sensation, intact vibration sensation and normal proprioception.   Coordination: Normal finger to nose and heel to shin. Normal balance and negative Romberg's sign   Skin and subcutaneous tissue: Skin is warm and intact. No rash noted. No cyanosis.   Psychiatric: Judgment and insight: Normal. Recent and remote memory: Intact. Mood and affect: Normal.        ASSESSMENT:   1. Sacroiliac joint dysfunction of right side    2. Greater trochanteric bursitis of both hips    3. Spondylosis of lumbar region without myelopathy or radiculopathy    4. DDD (degenerative disc disease), lumbar    5. Cervical spondylosis without myelopathy    6. Myofascial pain        PLAN/MEDICAL DECISION MAKING: Patient presents with a 3-year history of mechanical neck pain.  She has underwent cervical rhizotomies in the past with significant benefit, as regards her HPI.  Recent MRI of the cervical spine revealed to level degenerative changes, with facet arthritis.  At C3-C4 there is disc osteophyte complex formation that contacts and mildly effaces the ventral cord surface with mild central spinal canal stenosis. C4-C5: Small disc osteophyte complex formation that contacts in minimally effaces the ventral cord surface there is mild left neural foraminal narrowing and minimal right neural foraminal narrowing. At C5-C6: Disc osteophyte complex with contact and effacement of the ventral cord surface.  Mild central spinal canal stenosis with moderate left and moderate right neural foraminal stenosis. Her symptoms remain more mechanical.  She  also has quite a bit of myofascial symptoms upon physical examination. She is also having some recent lower back pain.  She tells me she has been seen by orthopedics, Dr. Brandon Melendez, and has recently underwent a lumbar medial branch block on the right at L3-L5, as well as a right L2-L3 transforaminal epidural steroid injection from which she tells me did not provide her with any pain relief.  She has participated in recent physical therapy for at least 6 weeks.  She also has a known history of bilateral hip arthritis and bilateral greater trochanteric bursitis, from which she gets injections, from which she reports does provide her with benefit.  Upon physical examination she continues to have positive findings for right sacroiliac joint dysfunction, as she reports some pain extending into her right thigh stopping above the knee.  She does have mechanical lower back pain as well from facet arthritis.  MRI of the lumbar spine, per radiology report revealed multilevel degeneration with facet arthritis, without high-grade spinal canal or neuroforaminal stenosis.  At L5-S1 there is a moderate amount of degenerative disc disease with a small disc protrusion, which could also be the culprit of her symptoms.  Patient has failed to obtain pain relief with conservative measures such as physical therapy, previous injections, oral analgesics to name a few, as referenced above I have reviewed all available patient's medical records as well as previous therapies as referenced above. I had a lengthy conversation with Ms. Bri Boyce regarding her chronic pain condition and potential therapeutic options including risks, benefits, alternative therapies, to name a few. Therefore, I have proposed the following plan:  1. Pharmacological measures: Reviewed and discussed;   A. Patient takes meloxicam and baclofen.  She reports she has tried muscle relaxants, anti-inflammatories, topical creams, to name a few which do not  provide her with any benefit.   2. Interventional pain management measures:   A. Treatment of neck pain: None indicated at this time.  Depending on continued response we may consider repeating bilateral cervical medial branch rhizotomies combined with trigger point injections at the bilateral levator scapulae, bilateral trapezius, and bilateral rhomboids to maximize her rehabilitation.  If she continues with occipital headaches we could consider diagnostic and therapeutic bilateral greater occipital nerve blocks by hydrodissection technique under ultrasound and fluoroscopic guidance.  Other options include the possibility of cervical epidural steroid injection by interlaminar approach.   B. Treatment of lower back pain/SI joint pain: Patient will be scheduled for diagnostic right sacroiliac joint injection with local anesthetics to determine the origin of her pain.  If she experiences significant benefit and reduction in her symptoms, (addedum on 10/03/2022 at 21:23) we will proceed with therapeutic right sacroiliac joint injection combined with right greater trochanteric bursa injection depending on insurance requirements to maximize her rehabilitation.  Otherwise, we may consider diagnostic and therapeutic bilateral versus right L5-S1 transforaminal epidural steroid injections.  Regards to her mechanical low back pain we may consider diagnostic bilateral lumbar medial branch blocks at L3, L4, L5; for bilateral lumbar facet joints at L4-L5, L5-S1 to clarify the origin of chronic refractory mechanical lower back pain. If patient experiences more than 80% relief along with significant improvement the range of motion of the lumbar spine, then, patient will be scheduled for a second set of diagnostic bilateral lumbar medial branch blocks, to then, proceed with bilateral lumbar medial branch rhizotomies.    3. Long-term rehabilitation efforts:  A. The patient does not have a history of falls. I did complete a risk  assessment for falls.   B. Patient will start a comprehensive physical therapy program for upper body strengthening/posture correction, core strengthening, neurodynamics, myofascial release, cupping and dry needling   C. Start an exercise program such as yoga and Pilates  D. Contrast therapy: Apply ice-packs for 15-20 minutes, followed by heating pads for 15-20 minutes to affected area   4. The patient has been instructed to contact my office with any questions or difficulties. The patient understands the plan and agrees to proceed accordingly.    The patient has a documented plan of care to address chronic pain.   Bri Boyce reports a pain score of 5/10.  Given her pain assessment as noted, treatment options were discussed and the following options were decided upon as a follow-up plan to address the patient's pain: continuation of current treatment plan for pain, home exercises and therapy and use of non-medical modalities (ice, heat, stretching and/or behavior modifications).          Pain Medications             aspirin 81 MG tablet Take 81 mg by mouth Daily.    baclofen (LIORESAL) 10 MG tablet Take 1 tablet by mouth 3 (Three) Times a Day. Start 1/2 tab QHS, then 1/2 tab TID PRN muscle spasms, if tolerated, 1 tab TID PRN    meloxicam (MOBIC) 7.5 MG tablet Take 1 tablet by mouth 2 (Two) Times a Day As Needed for Moderate Pain .           Patient Care Team:  Romulo Costa APRN as PCP - General (Nurse Practitioner)  Tay Ayala MD as Consulting Physician (Neurosurgery)  Sven Escamilla III, MD as Consulting Physician (Cardiology)  Miguel Whitten MD as Consulting Physician (Pain Medicine)  Marti Abdullahi APRN as Nurse Practitioner (Pain Medicine)     No orders of the defined types were placed in this encounter.        Future Appointments   Date Time Provider Department Center   10/3/2022  9:45 AM Miguel Whitten MD [facility]   10/11/2022 11:00 AM Marti Abdullahi APRN [facility]   8/8/2023  3:45 PM Sven Escamilla III, MD [facility]         [facility]     Please note that portions of this note were completed with a voice recognition program.

## 2022-09-19 ENCOUNTER — OFFICE VISIT (OUTPATIENT)
Dept: PAIN MEDICINE | Facility: CLINIC | Age: 62
End: 2022-09-19

## 2022-09-19 VITALS
TEMPERATURE: 95.7 F | HEIGHT: 65 IN | SYSTOLIC BLOOD PRESSURE: 140 MMHG | BODY MASS INDEX: 32.52 KG/M2 | HEART RATE: 67 BPM | WEIGHT: 195.2 LBS | OXYGEN SATURATION: 97 % | DIASTOLIC BLOOD PRESSURE: 78 MMHG

## 2022-09-19 DIAGNOSIS — M47.816 SPONDYLOSIS OF LUMBAR REGION WITHOUT MYELOPATHY OR RADICULOPATHY: ICD-10-CM

## 2022-09-19 DIAGNOSIS — M51.36 DDD (DEGENERATIVE DISC DISEASE), LUMBAR: ICD-10-CM

## 2022-09-19 DIAGNOSIS — M70.61 GREATER TROCHANTERIC BURSITIS OF BOTH HIPS: ICD-10-CM

## 2022-09-19 DIAGNOSIS — M70.62 GREATER TROCHANTERIC BURSITIS OF BOTH HIPS: ICD-10-CM

## 2022-09-19 DIAGNOSIS — M53.3 SACROILIAC JOINT DYSFUNCTION OF RIGHT SIDE: Primary | ICD-10-CM

## 2022-09-19 DIAGNOSIS — M47.812 CERVICAL SPONDYLOSIS WITHOUT MYELOPATHY: ICD-10-CM

## 2022-09-19 DIAGNOSIS — M79.18 MYOFASCIAL PAIN: ICD-10-CM

## 2022-09-19 DIAGNOSIS — M53.3 SACROILIAC JOINT DYSFUNCTION OF RIGHT SIDE: ICD-10-CM

## 2022-09-19 PROCEDURE — 99213 OFFICE O/P EST LOW 20 MIN: CPT | Performed by: NURSE PRACTITIONER

## 2022-09-21 RX ORDER — MELOXICAM 7.5 MG/1
TABLET ORAL
Qty: 60 TABLET | Refills: 0 | Status: SHIPPED | OUTPATIENT
Start: 2022-09-21 | End: 2022-10-17

## 2022-09-26 DIAGNOSIS — M53.3 SACROILIAC JOINT DYSFUNCTION OF RIGHT SIDE: Primary | ICD-10-CM

## 2022-09-28 ENCOUNTER — TELEPHONE (OUTPATIENT)
Dept: PAIN MEDICINE | Facility: CLINIC | Age: 62
End: 2022-09-28

## 2022-09-28 NOTE — TELEPHONE ENCOUNTER
Spoke with the pt and advised that she can take her 81 mg Aspirin, and nothing to eat or drink after midnight the night before her procedure. Pt inquired about needing a , and I advised that yes she does but she can speak with the [facility] and request a manager to see if she does. Transferred pt to [phone].

## 2022-09-28 NOTE — TELEPHONE ENCOUNTER
Provider: DR. ABBASI    Caller: PATIENT    Relationship to Patient: SELF      Phone Number: [phone]    Reason for Call: PT. HAS QUESTIONS ABOUT HER SI INJECTION PROCEDURE THAT IS SCHEDULED FOR Monday, 10/03/22.   SHE IS ASKING IF SHE IS OK TO TAKE HER 81 MG. ASPIRIN, DOES SHE NEED TO FAST THE NIGHT BEFORE?  ASKING FOR CALL BACK FROM NURSE OR ASSISTANT.

## 2022-10-03 ENCOUNTER — OUTSIDE FACILITY SERVICE (OUTPATIENT)
Dept: PAIN MEDICINE | Facility: CLINIC | Age: 62
End: 2022-10-03

## 2022-10-03 DIAGNOSIS — M53.3 SACROILIAC JOINT DYSFUNCTION OF RIGHT SIDE: Primary | ICD-10-CM

## 2022-10-03 DIAGNOSIS — M70.61 GREATER TROCHANTERIC BURSITIS OF BOTH HIPS: ICD-10-CM

## 2022-10-03 DIAGNOSIS — M70.62 GREATER TROCHANTERIC BURSITIS OF BOTH HIPS: ICD-10-CM

## 2022-10-03 PROCEDURE — 27096 INJECT SACROILIAC JOINT: CPT | Performed by: ANESTHESIOLOGY

## 2022-10-04 ENCOUNTER — TELEPHONE (OUTPATIENT)
Dept: PAIN MEDICINE | Facility: CLINIC | Age: 62
End: 2022-10-04

## 2022-10-04 NOTE — TELEPHONE ENCOUNTER
Spoke with patient.   Patient reports that pain prior to procedure 4/10  Pain after procedure yesterday was gone 0/10 during the period of pain relief, patient noted functional improvements with moving and turning.   She repots that her pain has returned in full today.     Procedure rescheduled.

## 2022-10-17 RX ORDER — MELOXICAM 7.5 MG/1
TABLET ORAL
Qty: 60 TABLET | Refills: 0 | Status: SHIPPED | OUTPATIENT
Start: 2022-10-17 | End: 2022-11-17

## 2022-10-25 DIAGNOSIS — M53.3 SACROILIAC JOINT DYSFUNCTION OF RIGHT SIDE: Primary | ICD-10-CM

## 2022-10-27 DIAGNOSIS — M53.3 SACROILIAC JOINT DYSFUNCTION OF RIGHT SIDE: Primary | ICD-10-CM

## 2022-10-31 ENCOUNTER — OUTSIDE FACILITY SERVICE (OUTPATIENT)
Dept: PAIN MEDICINE | Facility: CLINIC | Age: 62
End: 2022-10-31

## 2022-10-31 PROCEDURE — 27096 INJECT SACROILIAC JOINT: CPT | Performed by: ANESTHESIOLOGY

## 2022-11-01 ENCOUNTER — TELEPHONE (OUTPATIENT)
Dept: PAIN MEDICINE | Facility: CLINIC | Age: 62
End: 2022-11-01

## 2022-11-01 NOTE — TELEPHONE ENCOUNTER
FOLLOW-UP CALL AFTER PROCEDURE  Spoke with pt regarding how they are feeling after yesterday's procedure with Dr. Whitten. Patient advises that they are doing well.   Pain level before procedure: 6/10   Pain level after procedure: 0/10  Patient reports that the pain relief lasting currently hours. (pain level 1/10).   Patient denies side effects or complications  Patient does not have any questions or concerns at this time. I advised pt that they don’t have a follow up scheduled to give the office a call as needed

## 2022-11-08 ENCOUNTER — TELEPHONE (OUTPATIENT)
Dept: PAIN MEDICINE | Facility: CLINIC | Age: 62
End: 2022-11-08

## 2022-11-08 NOTE — TELEPHONE ENCOUNTER
Patient would like to know if she needs to schedule a follow up visit to address the pain in her lower back. Patient said that she did experience pain relief in her right leg however she is still experiencing low back pain. Please advise.

## 2022-11-10 ENCOUNTER — TELEPHONE (OUTPATIENT)
Dept: CARDIOLOGY | Facility: CLINIC | Age: 62
End: 2022-11-10

## 2022-11-10 DIAGNOSIS — R93.89 ABNORMAL CT SCAN, NECK: Primary | ICD-10-CM

## 2022-11-10 NOTE — TELEPHONE ENCOUNTER
Patient called to have you review her CT scan ordered by PCP. Scanned in media.    CT scan soft tissue of the neck (10/10/2022): bilateral carotid bulb plaque.     She is on ASA 81 mg daily and atorvastatin 80 mg daily for her CAD.     Any changes?

## 2022-11-14 RX ORDER — CHLORTHALIDONE 25 MG/1
TABLET ORAL
Qty: 45 TABLET | Refills: 1 | Status: SHIPPED | OUTPATIENT
Start: 2022-11-14

## 2022-11-17 RX ORDER — MELOXICAM 7.5 MG/1
TABLET ORAL
Qty: 60 TABLET | Refills: 0 | Status: SHIPPED | OUTPATIENT
Start: 2022-11-17 | End: 2022-12-13

## 2022-12-07 ENCOUNTER — HOSPITAL ENCOUNTER (OUTPATIENT)
Dept: CARDIOLOGY | Facility: HOSPITAL | Age: 62
Discharge: HOME | End: 2022-12-07
Admitting: INTERNAL MEDICINE

## 2022-12-07 VITALS — BODY MASS INDEX: 32.49 KG/M2 | HEIGHT: 65 IN | WEIGHT: 195 LBS

## 2022-12-07 DIAGNOSIS — R93.89 ABNORMAL CT SCAN, NECK: ICD-10-CM

## 2022-12-07 LAB
BH CV XLRA MEAS LEFT DIST CCA EDV: -25.8 CM/SEC
BH CV XLRA MEAS LEFT DIST CCA PSV: -93.8 CM/SEC
BH CV XLRA MEAS LEFT DIST ICA EDV: 26.4 CM/SEC
BH CV XLRA MEAS LEFT DIST ICA PSV: 83.8 CM/SEC
BH CV XLRA MEAS LEFT ICA/CCA RATIO: 0.79
BH CV XLRA MEAS LEFT MID CCA EDV: 30.5 CM/SEC
BH CV XLRA MEAS LEFT MID CCA PSV: 110 CM/SEC
BH CV XLRA MEAS LEFT MID ICA EDV: 34 CM/SEC
BH CV XLRA MEAS LEFT MID ICA PSV: 87.4 CM/SEC
BH CV XLRA MEAS LEFT PROX CCA EDV: 31.1 CM/SEC
BH CV XLRA MEAS LEFT PROX CCA PSV: 111 CM/SEC
BH CV XLRA MEAS LEFT PROX ECA EDV: -14.7 CM/SEC
BH CV XLRA MEAS LEFT PROX ECA PSV: -102 CM/SEC
BH CV XLRA MEAS LEFT PROX ICA EDV: -21.1 CM/SEC
BH CV XLRA MEAS LEFT PROX ICA PSV: -79.2 CM/SEC
BH CV XLRA MEAS LEFT PROX SCLA PSV: 200 CM/SEC
BH CV XLRA MEAS LEFT VERTEBRAL A EDV: 11 CM/SEC
BH CV XLRA MEAS LEFT VERTEBRAL A PSV: 51.5 CM/SEC
BH CV XLRA MEAS RIGHT DIST CCA EDV: -23.5 CM/SEC
BH CV XLRA MEAS RIGHT DIST CCA PSV: -82.7 CM/SEC
BH CV XLRA MEAS RIGHT DIST ICA EDV: -28.7 CM/SEC
BH CV XLRA MEAS RIGHT DIST ICA PSV: -85 CM/SEC
BH CV XLRA MEAS RIGHT ICA/CCA RATIO: 0.9
BH CV XLRA MEAS RIGHT MID CCA EDV: 28.1 CM/SEC
BH CV XLRA MEAS RIGHT MID CCA PSV: 87.4 CM/SEC
BH CV XLRA MEAS RIGHT MID ICA EDV: -29.3 CM/SEC
BH CV XLRA MEAS RIGHT MID ICA PSV: -78.6 CM/SEC
BH CV XLRA MEAS RIGHT PROX CCA EDV: 28.7 CM/SEC
BH CV XLRA MEAS RIGHT PROX CCA PSV: 96.7 CM/SEC
BH CV XLRA MEAS RIGHT PROX ECA EDV: -17 CM/SEC
BH CV XLRA MEAS RIGHT PROX ECA PSV: -89.1 CM/SEC
BH CV XLRA MEAS RIGHT PROX ICA PSV: -47.1 CM/SEC
BH CV XLRA MEAS RIGHT PROX SCLA PSV: 133 CM/SEC
BH CV XLRA MEAS RIGHT VERTEBRAL A EDV: 16.1 CM/SEC
BH CV XLRA MEAS RIGHT VERTEBRAL A PSV: 60.5 CM/SEC
MAXIMAL PREDICTED HEART RATE: 160 BPM
STRESS TARGET HR: 136 BPM

## 2022-12-07 PROCEDURE — 93880 EXTRACRANIAL BILAT STUDY: CPT | Performed by: INTERNAL MEDICINE

## 2022-12-07 PROCEDURE — 93880 EXTRACRANIAL BILAT STUDY: CPT

## 2022-12-08 ENCOUNTER — TELEPHONE (OUTPATIENT)
Dept: CARDIOLOGY | Facility: CLINIC | Age: 62
End: 2022-12-08

## 2022-12-08 ENCOUNTER — TELEPHONE (OUTPATIENT)
Dept: PAIN MEDICINE | Facility: CLINIC | Age: 62
End: 2022-12-08

## 2022-12-08 NOTE — TELEPHONE ENCOUNTER
I ADVISED PT THERE IS NOTHING SOONER THEN APPT.  PT IS ASKING FOR AN RX TO HELP HER LEG ACHES THROUGH THE NIGHT. IS USING BENGAY AND IS GOING TO START HER MELOXICAM BACK.

## 2022-12-08 NOTE — TELEPHONE ENCOUNTER
----- Message from Sven Escamilla III, MD sent at 12/8/2022 10:06 AM EST -----  Evidence of cholesterol buildup without significant blockages noted in your carotid arteries.

## 2022-12-08 NOTE — TELEPHONE ENCOUNTER
Relationship to patient: SELF    Best call back number: [phone]          Type of visit: SI JOINT INJECTION       Additional notes:PT. LAST DID THIS ON 10/31/22.   SAID SHE SCHEDULE ANOTHER SI INJECTION?  PLEASE CALL TO ADVISE.

## 2022-12-13 RX ORDER — MELOXICAM 7.5 MG/1
TABLET ORAL
Qty: 60 TABLET | Refills: 0 | Status: SHIPPED | OUTPATIENT
Start: 2022-12-13 | End: 2023-01-09

## 2022-12-13 RX ORDER — BACLOFEN 10 MG/1
TABLET ORAL
Qty: 90 TABLET | Refills: 0 | Status: SHIPPED | OUTPATIENT
Start: 2022-12-13

## 2023-01-09 RX ORDER — MELOXICAM 7.5 MG/1
TABLET ORAL
Qty: 60 TABLET | Refills: 0 | Status: SHIPPED | OUTPATIENT
Start: 2023-01-09

## 2023-02-02 NOTE — PROGRESS NOTES
"Chief complaint: \"Low back, right gluteal and right thigh pain.  Neck pain.\"      History of present illness: Mrs. Bri Boyce is a 62 y.o. female, who returns to the clinic for evaluation of recurrent chronic neck pain and lower back pain.  She was last seen on October 31, 2022, when she underwent a therapeutic right sacroiliac joint injection with steroids, from which she reports experiencing 100% pain relief and functional improvement lasting 6 weeks.  She tells me the symptoms she was experiencing into her right gluteal right hip and right posterior thigh, completely resolved with injection.  She has began to experience recurrence of those symptoms, as well as lower back pain.  Additionally she underwent bilateral cervical medial branch rhizotomies at C3, C4, and C5, performed on August 1, 2022, from which she reports experiencing greater than 50% pain relief and functional improvement that is ongoing.  She continues learned home exercises from previous physical therapy. She also gets right hip injections every 3 months per orthopedics.    Problem #1: Neck Pain  Pain History: Ms. Bri Boyce, 62 y.o. female originally referred by Dr. Tay Ayala in consultation for chronic intractable neck pain.  Patient reports a now 3 year history of neck pain, which began without incident.   Pain Description: constant pain with intermittent exacerbation, described as sharp, aching, and \"on fire\" sensation.   Radiation of Pain: The pain radiates into the bilateral occipital region and into the bilateral shoulders.   Pain intensity today: 3/10  Average pain intensity last week: 5/10  Pain intensity ranges from: 3/10 to 7/10  Aggravating factors: Pain increases with extension and rotation of the cervical spine   Alleviating factors: Pain decreases with massage and lying down   Associated Symptoms:   Patient denies pain, numbness, or weakness in the upper extremities.   Patient denies any new bladder or bowel " problems.   Patient denies difficulties with her balance or recent falls.   Patient reports bilateral occipital headaches lasting occurring 1-2 times per month.      Problem #2: Lower Back Pain  Pain History: Patient reports a 1-year history of pain, which began without incident. Patient has failed to obtain pain relief with conservative measures for more than 3 months including oral analgesics, physical therapy, previous injections, to name a few.  Pain Description: constant lower back/SI pain with intermittent exacerbation, described as aching sensation.   Radiation of Pain:  The lower back pain primarily does not radiate, she does experience symptoms starting in the right sacroiliac joint which extended to the right gluteal, right hip and right posterior thigh.  She tells me with sacroiliac joint injection, the symptoms completely resolved.    Pain intensity today: 3/10  Average pain intensity last week: 7/10  Pain intensity ranges from: 3/10 to 7/10  Aggravating factors: Pain increases with standing, walking (less than five minutes), bending, twisting climbing stairs, sitting. Patient describes neurogenic claudication.  Patient reports limitations and general mobility deficits   Alleviating factors: Pain decreases with sitting with legs crossed.     Associated Symptoms:   Patient reports pain and weakness in the right thigh.  She denies numbness.  Pain interferes with her sleep      Review of previous therapies and additional medical records:  Bri Boyce has already failed the following measures, including:   Conservative measures: oral analgesics, massage, physical therapy, chiropractic therapy and acupuncture   Interventional: 08/31/2015: Bilateral cervical RFA  3/4/2020: Bilateral cervical RFA  08/01/2022: Bilateral cervical RFA  10/31/2022: Tx right sacroiliac joint injection  Procedures with [facility]:  4/1/2022: Right L2-L3 TFESI   05/23/2022: Right Lumbar MBB at L3-L5  SI  joint injections (?)  Right hip injections every 3 months?  Surgical: No previous history of cervical spine or shoulder surgery.  No history of lower back or hip surgery.  Bri Boyce underwent neurosurgical  consultation with Dr. Tay Ayala on 6/12/2015 and on 10/10/2020, and was found not to be a surgical candidate.   Bri Boyce presents with significant comorbidities including hypertension, hypercholesterolemia, moderate obesity, type 2 diabetes  In terms of current analgesics, Bri Boyce takes: Baclofen, Mobic.  I have reviewed her Ankush Report is consistent with medication reconciliation.  SOAPP: Low Risk    Global Pain Scale 02-04 2020 06-20 2022 09-19 2022 02-06 2023       Pain 10 12 14 13       Feelings 3 0 0 3       Clinical outcomes 3 0 1 13       Activities 0 0 2 6       GPS Total: 16 12 17 35           Diagnostic Studies:   Cervical spine x-rays with flexion-extension views 6/20/2022: Imaging was reviewed.  No evidence of subluxation or instability.  Degenerative disc disease with endplate spurring most significant at C5-C6 and C6-C7.  Lumbar spine x-rays with flexion-extension views 6/20/2022: No evidence of subluxation or spondylolisthesis.  No evidence of instability.  Mild to moderate degenerative disc disease with facet arthritis.  Most significant at L5-S1.  Bilateral hip x-rays 6/20/2022: Imaging was reviewed.  Mild osteoarthritis seen within the hips and sacroiliac joints.  MRI of the cervical spine without contrast 3/14/2022: Per radiology report.  Mild reversal of the normal cervical lordosis.  There are degenerative endplate changes most significant at C5-C6.  There are high T2 signal changes noted within the amy, which can be seen with small vessel ischemic related changes and demyelinating processes.  MRI of the brain could be obtained.  C2-C3: No significant spinal canal or neural foraminal stenosis.  C3-C4: Disc osteophyte complex formation that  contacts and mildly effaces the ventral cord surface with mild central spinal canal stenosis.  C4-C5: Small disc osteophyte complex formation that contacts in minimally effaces the ventral cord surface there is mild left neural foraminal narrowing and minimal right neural foraminal narrowing.  C5-C6: Disc osteophyte complex with contact and effacement of the ventral cord surface.  Mild central spinal canal stenosis with moderate left and moderate right neural foraminal stenosis.  C6-C7: Small disc osteophyte complex formation with mild neuroforaminal stenosis, greater on the right.  There is minimal spinal canal stenosis.  C7-T1: Small disc osteophyte complex formation without significant spinal canal or neural foraminal stenosis.  MRI of the brain with and without contrast 6/8/2022: Per radiology report.  No evidence of acute ischemia or intracranial hemorrhage.  There are no abnormal enhancing mass lesions.  The pituitary gland images are normal.  The cerebellar tonsils are in the normal location.  There are flair signal changes noted within the cerebral white matter and in the amy, these are nonspecific signal changes and can be seen with small vessel ischemic disease as well as demyelinating processes among other etiologies a few of these lesions raise suspicion for possible demyelinating process such as multiple sclerosis.  MRI of the lumbar spine without contrast 3/13/2022, per radiology report.  T12-L1: No significant spinal canal or neuroforaminal stenosis.  L1-L2: Small disc bulges with no significant spinal canal or neural foraminal stenosis.  L2-L3: Disc bulge with a small disc extrusion that extends superiorly along the inferior endplate of L2.  No significant spinal canal or neuroforaminal stenosis.  L3-4: Small disc bulge with mild facet arthritis without significant spinal canal or neuroforaminal stenosis.  L4-L5: Mild facet arthritis without significant spinal canal or neuroforaminal  stenosis.  L5-S1: Small disc bulge with a small posterior central disc protrusion with mild facet arthritis.  No significant spinal canal or neuroforaminal stenosis.      Review of Systems   Respiratory: Positive for shortness of breath.    Gastrointestinal: Positive for abdominal pain.   Musculoskeletal: Positive for arthralgias, back pain, myalgias, neck pain and neck stiffness.   All other systems reviewed and are negative.       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    • Occipital neuralgia   • DDD (degenerative disc disease), cervical   • Cervical spondylosis without myelopathy   • Neck pain   • Shoulder pain   • Back pain   • Nausea vomiting and diarrhea   • Lactic acidosis   • Acute superficial gastritis without hemorrhage   • Type 2 diabetes mellitus (HCC)       Past Medical History:   Diagnosis Date   • Abnormal heart rhythm 10/14/2016   • Arthritis 10/14/2016   • CAD (coronary artery disease) 10/14/2016    Non-obstructive CAD   • Chest pain 10/14/2016   • Heart disease    • Hypercholesterolemia 10/14/2016   • Hypertension 10/14/2016   • Pneumonia 12/2019         Past Surgical History:   Procedure Laterality Date   • OTHER SURGICAL HISTORY      CYST REMOVAL BREAST   • OVARIAN CYST SURGERY     • TUBAL ABDOMINAL LIGATION           Family History   Problem Relation Age of Onset   • Other Other         BLOOD CLOTS   • Cancer Other    • Heart disease Other    • Diabetes Other    • Arthritis Other    • Stroke Mother    • Diabetes Mother    • Heart disease Father    • No Known Problems Sister    • No Known Problems Brother    • Cancer Maternal Grandmother    • No Known Problems Sister           Social History     Socioeconomic History   • Marital status:    Tobacco Use   • Smoking status: Former     Packs/day: 1.00     Types: Cigarettes     Quit date:      Years since quittin.1   • Smokeless tobacco: Never   Vaping Use   • Vaping Use: Never used   Substance and Sexual Activity   • Alcohol use: No   • Drug use: No   • Sexual activity: Defer           Current Outpatient Medications:   •  albuterol sulfate  (90 Base) MCG/ACT inhaler, Inhale 2 puffs Every 4 (Four) Hours As Needed for Wheezing., Disp: , Rfl:   •  aspirin 81 MG tablet, Take 81 mg by mouth Daily., Disp: , Rfl:   •  atorvastatin (LIPITOR) 80 MG tablet, Take 80 mg by mouth Daily., Disp: , Rfl:   •  baclofen (LIORESAL) 10 MG tablet, START 1/2 TABLET AT BEDTIME THEN 1/2 TABLET THREE TIMES A DAY AS NEEDED FOR SPASMS, IF TOLERATED MAY TAKE 1 TABLET THREE TIMES DAILY AS NEEDED., Disp: 90 tablet, Rfl: 0  •  chlorthalidone (HYGROTON) 25 MG tablet, TAKE 1/2 TABLET BY MOUTH DAILY, Disp: 45 tablet, Rfl: 1  •  Diphenhydramine-APAP, sleep, (TYLENOL PM EXTRA STRENGTH PO), Take 1 tablet by mouth As Needed., Disp: , Rfl:   •  lisinopril (PRINIVIL,ZESTRIL) 5 MG tablet, Take 5 mg by mouth Daily., Disp: , Rfl:   •  meloxicam (MOBIC) 7.5 MG tablet, TAKE 1 TABLET BY MOUTH TWICE DAILY AS NEEDED FOR MODERATE PAIN, Disp: 60 tablet, Rfl: 0  •  metoprolol tartrate (LOPRESSOR) 25 MG tablet, Take 1 tablet by mouth Every 8 (Eight) Hours As Needed (palpitations)., Disp: 30 tablet, Rfl: 3  •  metoprolol tartrate (LOPRESSOR) 25 MG tablet, TAKE 1 TABLET BY MOUTH TWICE DAILY, Disp: 180 tablet, Rfl: 1  •  montelukast (SINGULAIR) 10 MG tablet, Take 10 mg by mouth Daily., Disp: , Rfl:   •  tamsulosin (FLOMAX) 0.4 MG capsule 24 hr capsule, Take 1 capsule by mouth Daily., Disp: , Rfl:   •  Wixela Inhub 100-50 MCG/ACT DISKUS, Inhale 1 puff 2 (Two) Times a Day., Disp: , Rfl:   •  Ozempic, 0.25 or 0.5 MG/DOSE, 2 MG/1.5ML solution pen-injector, INJECT 0.5MG ONCE  "WEEKLY AS DIRECTED, Disp: , Rfl:       Allergies   Allergen Reactions   • Percocet [Oxycodone-Acetaminophen] Nausea And Vomiting         The following portions of the patient's history were reviewed and updated as appropriate: problem list, past medical history, past surgery history, social history, family history, medications, and allergies     Ht 165.1 cm (65\")   Wt 88.5 kg (195 lb)   BMI 32.45 kg/m²       Physical Exam   Constitutional: Patient appears, well-developed, well-nourished, well-hydrated  HEENT: Head: Normocephalic and atraumatic  Eyes: Conjunctivae and lids are normal  Pupils: Equal, round, reactive to light  Neck: Trachea normal. Neck supple. No JVD present.   Pulmonary: No increased work of breathing or signs of respiratory distress.   Musculoskeletal   Gait and station: Gait evaluation demonstrated a normal gait   Lumbar spine: Passive and active range of motion are limited secondary to pain.  Flexion, extension and rotation of the lumbar spine increased and reproduced pain. Lumbar facet joint loading maneuvers are positive.  Héctor test, Gaenslen's test, SI compression test, Yeoman's test positive on the right.   Piriformis maneuvers are negative.    Palpation of the bilateral ischial tuberosities, unrevealing.    Palpation of the bilateral greater trochanter, reveals tenderness bilaterally.    Examination of the Iliotibial band: unrevealing.    Hip joints: The range of motion of the hip joints is full and without pain on the left, limited to internal rotation on the right.  Neurological:   Patient is alert and oriented to person, place, and time.   Speech: Normal.   Cortical function: Normal mental status.   Cranial nerves 2-12: intact.   Reflex Scores:  Right brachioradialis: 2+  Left brachioradialis: 2+  Right biceps: 2+  Left biceps: 2+  Right triceps: 2+  Left triceps: 2+  Right patellar: 1+  Left patellar: 1+  Right Achilles: 1+  Left Achilles: 1+  Motor strength: 5/5  Motor Tone: " Normal  Involuntary movements: None.   Superficial/Primitive Reflexes: Primitive reflexes were absent.   Right Deleon: Absent  Left Deleon: Absent  Right ankle clonus: Absent  Left ankle clonus: Absent   Spurling sign: Negative. Neck tornado test: Negative. Lhermitte sign: Negative. Long tract signs: Negative.  Straight leg raising test: Negative.  Femoral stretch sign: Negative  Sensory exam: Intact to light touch, intact pain and temperature sensation, intact vibration sensation and normal proprioception.   Coordination: Normal finger to nose and heel to shin. Normal balance and negative Romberg's sign   Skin and subcutaneous tissue: Skin is warm and intact. No rash noted. No cyanosis.   Psychiatric: Judgment and insight: Normal. Recent and remote memory: Intact. Mood and affect: Normal.        ASSESSMENT:   1. Sacroiliac joint dysfunction of right side    2. Spondylosis of lumbar region without myelopathy or radiculopathy    3. DDD (degenerative disc disease), lumbar    4. Physical deconditioning    5. Myofascial pain    6. Cervical spondylosis without myelopathy        PLAN/MEDICAL DECISION MAKING: Patient presents with a 3-year history of mechanical neck pain.  She has underwent cervical rhizotomies in the past with significant benefit, as regards her HPI.  Recent MRI of the cervical spine revealed to level degenerative changes, with facet arthritis.  At C3-C4 there is disc osteophyte complex formation that contacts and mildly effaces the ventral cord surface with mild central spinal canal stenosis. C4-C5: Small disc osteophyte complex formation that contacts in minimally effaces the ventral cord surface there is mild left neural foraminal narrowing and minimal right neural foraminal narrowing. At C5-C6: Disc osteophyte complex with contact and effacement of the ventral cord surface.  Mild central spinal canal stenosis with moderate left and moderate right neural foraminal stenosis. Her symptoms remain more  mechanical.  She also has quite a bit of myofascial symptoms upon physical examination. She is also having some recent lower back pain.  She tells me she has been seen by orthopedics, Dr. Brandon Melendez, and has recently underwent a lumbar medial branch block on the right at L3-L5, as well as a right L2-L3 transforaminal epidural steroid injection from which she tells me did not provide her with any pain relief.  She has participated in recent physical therapy for at least 6 weeks.  She also has a known history of bilateral hip arthritis and bilateral greater trochanteric bursitis, from which she gets injections, from which she reports does provide her with benefit.  She continues to have positive findings for right sacroiliac joint dysfunction, as she reports some pain extending into her right thigh stopping above the knee.  She tells me with the right sacroiliac joint injection, the symptoms completely resolved, as referenced above.  She does have mechanical lower back pain as well from facet arthritis.  MRI of the lumbar spine, per radiology report revealed multilevel degeneration with facet arthritis, without high-grade spinal canal or neuroforaminal stenosis.  At L5-S1 there is a moderate amount of degenerative disc disease with a small disc protrusion.  Patient has failed to obtain pain relief with conservative measures such as physical therapy, previous injections, oral analgesics to name a few, as referenced above I have reviewed all available patient's medical records as well as previous therapies as referenced above. I had a lengthy conversation with Ms. Bri Boyce regarding her chronic pain condition and potential therapeutic options including risks, benefits, alternative therapies, to name a few. Therefore, I have proposed the following plan:  1. Pharmacological measures: Reviewed and discussed;   A. Patient takes meloxicam and baclofen.  She reports she has tried muscle relaxants,  anti-inflammatories, topical creams, to name a few which do not provide her with any benefit.   2. Interventional pain management measures:   A. Treatment of neck pain: None indicated at this time.  Depending on continued response we may consider repeating bilateral cervical medial branch rhizotomies combined with trigger point injections at the bilateral levator scapulae, bilateral trapezius, and bilateral rhomboids to maximize her rehabilitation.  If she continues with occipital headaches we could consider diagnostic and therapeutic bilateral greater occipital nerve blocks by hydrodissection technique under ultrasound and fluoroscopic guidance.  Other options include the possibility of cervical epidural steroid injection by interlaminar approach.   B. Treatment of lower back pain/SI joint pain: Patient will be scheduled for thereapeutic right sacroiliac joint injection.  In regards regards to her mechanical low back pain after treatment of her sacroiliac joint dysfunction, if her thigh symptoms are improved, we will proceed with diagnostic bilateral lumbar medial branch blocks at L3, L4, L5; for bilateral lumbar facet joints at L4-L5, L5-S1 to clarify the origin of chronic refractory mechanical lower back pain. If patient experiences more than 80% relief along with significant improvement the range of motion of the lumbar spine, then, patient will be scheduled for a second set of diagnostic bilateral lumbar medial branch blocks, to then, proceed with bilateral lumbar medial branch rhizotomies. Otherwise, we may consider diagnostic and therapeutic bilateral versus right L5-S1 transforaminal epidural steroid injections.    3. Long-term rehabilitation efforts:  A. The patient does not have a history of falls. I did complete a risk assessment for falls.   B. Patient will start a comprehensive physical therapy program for upper body strengthening/posture correction, core strengthening, neurodynamics, myofascial  release, cupping and dry needling   C. Start an exercise program such as yoga and Pilates  D. Contrast therapy: Apply ice-packs for 15-20 minutes, followed by heating pads for 15-20 minutes to affected area   4. The patient has been instructed to contact my office with any questions or difficulties. The patient understands the plan and agrees to proceed accordingly.    The patient has a documented plan of care to address chronic pain.   Bri Boyce reports a pain score of 8/10.  Given her pain assessment as noted, treatment options were discussed and the following options were decided upon as a follow-up plan to address the patient's pain: continuation of current treatment plan for pain, home exercises and therapy and use of non-medical modalities (ice, heat, stretching and/or behavior modifications).          Pain Medications             aspirin 81 MG tablet Take 81 mg by mouth Daily.    baclofen (LIORESAL) 10 MG tablet START 1/2 TABLET AT BEDTIME THEN 1/2 TABLET THREE TIMES A DAY AS NEEDED FOR SPASMS, IF TOLERATED MAY TAKE 1 TABLET THREE TIMES DAILY AS NEEDED.    meloxicam (MOBIC) 7.5 MG tablet TAKE 1 TABLET BY MOUTH TWICE DAILY AS NEEDED FOR MODERATE PAIN           Patient Care Team:  Romulo Costa APRN as PCP - General (Nurse Practitioner)  Tay Ayala MD as Consulting Physician (Neurosurgery)  Sven Escamilla III, MD as Consulting Physician (Cardiology)  Miguel Whitten MD as Consulting Physician (Pain Medicine)  Marti Abdullahi APRN as Nurse Practitioner (Pain Medicine)     No orders of the defined types were placed in this encounter.        Future Appointments   Date Time Provider Department Center   2/20/2023  7:15 AM Miguel Whitten MD [facility]   8/8/2023  3:45 PM Sven Escamilla III, MD [facility]         BALDO Lew     Please note that portions of this note were completed with a voice recognition program.

## 2023-02-06 ENCOUNTER — OFFICE VISIT (OUTPATIENT)
Dept: PAIN MEDICINE | Facility: CLINIC | Age: 63
End: 2023-02-06
Payer: COMMERCIAL

## 2023-02-06 VITALS — HEIGHT: 65 IN | WEIGHT: 195 LBS | BODY MASS INDEX: 32.49 KG/M2

## 2023-02-06 DIAGNOSIS — M53.3 SACROILIAC JOINT DYSFUNCTION OF RIGHT SIDE: Primary | ICD-10-CM

## 2023-02-06 DIAGNOSIS — M51.36 DDD (DEGENERATIVE DISC DISEASE), LUMBAR: ICD-10-CM

## 2023-02-06 DIAGNOSIS — M47.816 SPONDYLOSIS OF LUMBAR REGION WITHOUT MYELOPATHY OR RADICULOPATHY: ICD-10-CM

## 2023-02-06 DIAGNOSIS — M79.18 MYOFASCIAL PAIN: ICD-10-CM

## 2023-02-06 DIAGNOSIS — M47.812 CERVICAL SPONDYLOSIS WITHOUT MYELOPATHY: ICD-10-CM

## 2023-02-06 DIAGNOSIS — R53.81 PHYSICAL DECONDITIONING: ICD-10-CM

## 2023-02-06 DIAGNOSIS — M53.3 SACROILIAC JOINT DYSFUNCTION OF RIGHT SIDE: ICD-10-CM

## 2023-02-06 PROCEDURE — 99213 OFFICE O/P EST LOW 20 MIN: CPT | Performed by: NURSE PRACTITIONER

## 2023-02-20 ENCOUNTER — OUTSIDE FACILITY SERVICE (OUTPATIENT)
Dept: PAIN MEDICINE | Facility: CLINIC | Age: 63
End: 2023-02-20
Payer: COMMERCIAL

## 2023-02-20 DIAGNOSIS — M47.816 SPONDYLOSIS OF LUMBAR REGION WITHOUT MYELOPATHY OR RADICULOPATHY: Primary | ICD-10-CM

## 2023-02-20 PROCEDURE — 27096 INJECT SACROILIAC JOINT: CPT | Performed by: ANESTHESIOLOGY

## 2023-02-20 PROCEDURE — 99152 MOD SED SAME PHYS/QHP 5/>YRS: CPT | Performed by: ANESTHESIOLOGY

## 2023-02-21 ENCOUNTER — TELEPHONE (OUTPATIENT)
Dept: PAIN MEDICINE | Facility: CLINIC | Age: 63
End: 2023-02-21
Payer: COMMERCIAL

## 2023-02-21 NOTE — TELEPHONE ENCOUNTER
FOLLOW-UP CALL AFTER PROCEDURE  Spoke with pt regarding how they are feeling after yesterday's procedure with Dr. Whitten. Patient advises that they are doing well.   Pain level before procedure: 8/10   Pain level after procedure: 1/10  Patient reports that the pain relief lasting  For hours. (pain level 0/10)  Patient denies side effects or complications  Patient does not have any questions or concerns at this time  Disposition:   I provided information regarding date and time of next procedure: March 1, 2023 with 630 am arrival.   I advised that patient must have a , and that the  must remain in the premises until after the procedure is completed and the patient is ready to be discharged.  Nothing by mouth (eat or drink) for at least 8 hours prior to the procedure. Patient can take medications with a sip of water.

## 2023-02-27 DIAGNOSIS — M47.816 SPONDYLOSIS OF LUMBAR REGION WITHOUT MYELOPATHY OR RADICULOPATHY: Primary | ICD-10-CM

## 2023-03-01 ENCOUNTER — OUTSIDE FACILITY SERVICE (OUTPATIENT)
Dept: PAIN MEDICINE | Facility: CLINIC | Age: 63
End: 2023-03-01
Payer: COMMERCIAL

## 2023-03-01 DIAGNOSIS — M47.816 SPONDYLOSIS OF LUMBAR REGION WITHOUT MYELOPATHY OR RADICULOPATHY: Primary | ICD-10-CM

## 2023-03-01 PROCEDURE — 99152 MOD SED SAME PHYS/QHP 5/>YRS: CPT | Performed by: ANESTHESIOLOGY

## 2023-03-01 PROCEDURE — 64493 INJ PARAVERT F JNT L/S 1 LEV: CPT | Performed by: ANESTHESIOLOGY

## 2023-03-01 PROCEDURE — 64494 INJ PARAVERT F JNT L/S 2 LEV: CPT | Performed by: ANESTHESIOLOGY

## 2023-03-02 ENCOUNTER — TELEPHONE (OUTPATIENT)
Dept: PAIN MEDICINE | Facility: CLINIC | Age: 63
End: 2023-03-02
Payer: COMMERCIAL

## 2023-03-02 NOTE — TELEPHONE ENCOUNTER
FOLLOW-UP CALL AFTER PROCEDURE  Spoke with pt regarding how they are feeling after yesterday's procedure with Dr. Whitten. Patient advises that they are doing well.   Pain level before procedure: 7/10   Pain level after procedure: 0-1/10  Patient reports that the pain relief lasted for approximately couple hours.    Patient denies side effects or complications  Patient does not have any questions or concerns at this time  Disposition:   I provided information regarding date and time of next procedure: 3/20/2023 at 0715 arrival.  I advised that patient must have a , and that the  must remain in the premises until after the procedure is completed and the patient is ready to be discharged.  Nothing by mouth (eat or drink) for at least 8 hours prior to the procedure. Patient can take medications with a sip of water.

## 2023-03-20 ENCOUNTER — OUTSIDE FACILITY SERVICE (OUTPATIENT)
Dept: PAIN MEDICINE | Facility: CLINIC | Age: 63
End: 2023-03-20
Payer: COMMERCIAL

## 2023-03-20 DIAGNOSIS — M47.816 SPONDYLOSIS OF LUMBAR REGION WITHOUT MYELOPATHY OR RADICULOPATHY: Primary | ICD-10-CM

## 2023-03-20 PROCEDURE — 64493 INJ PARAVERT F JNT L/S 1 LEV: CPT | Performed by: ANESTHESIOLOGY

## 2023-03-20 PROCEDURE — 99152 MOD SED SAME PHYS/QHP 5/>YRS: CPT | Performed by: ANESTHESIOLOGY

## 2023-03-20 PROCEDURE — 64494 INJ PARAVERT F JNT L/S 2 LEV: CPT | Performed by: ANESTHESIOLOGY

## 2023-03-22 ENCOUNTER — TELEPHONE (OUTPATIENT)
Dept: PAIN MEDICINE | Facility: CLINIC | Age: 63
End: 2023-03-22
Payer: COMMERCIAL

## 2023-03-22 NOTE — TELEPHONE ENCOUNTER
FOLLOW-UP CALL AFTER PROCEDURE  Spoke with pt regarding how they are feeling after Monday's procedure with Dr. Whitten. Patient advises that they are doing well.   Pain level before procedure: 6/10   Pain level after procedure: 0/10  Patient reports that the pain relief lasting currently. (pain level 0/10)   Patient denies side effects or complications  Patient does not have any questions or concerns at this time.   Disposition:   I provided information regarding date and time of next procedure: 4/10/2023 with 0915 am arrival.  I advised that patient must have a , and that the  must remain in the premises until after the procedure is completed and the patient is ready to be discharged.  Nothing by mouth (eat or drink) for at least 8 hours prior to the procedure. Patient can take medications with a sip of water.

## 2023-04-05 DIAGNOSIS — M47.816 SPONDYLOSIS OF LUMBAR REGION WITHOUT MYELOPATHY OR RADICULOPATHY: Primary | ICD-10-CM

## 2023-04-10 ENCOUNTER — OUTSIDE FACILITY SERVICE (OUTPATIENT)
Dept: PAIN MEDICINE | Facility: CLINIC | Age: 63
End: 2023-04-10
Payer: COMMERCIAL

## 2023-04-10 PROCEDURE — 64635 DESTROY LUMB/SAC FACET JNT: CPT | Performed by: ANESTHESIOLOGY

## 2023-04-10 PROCEDURE — 99152 MOD SED SAME PHYS/QHP 5/>YRS: CPT | Performed by: ANESTHESIOLOGY

## 2023-04-10 PROCEDURE — 64636 DESTROY L/S FACET JNT ADDL: CPT | Performed by: ANESTHESIOLOGY

## 2023-04-12 ENCOUNTER — TELEPHONE (OUTPATIENT)
Dept: PAIN MEDICINE | Facility: CLINIC | Age: 63
End: 2023-04-12
Payer: COMMERCIAL

## 2023-05-15 RX ORDER — CHLORTHALIDONE 25 MG/1
12.5 TABLET ORAL DAILY
Qty: 45 TABLET | Refills: 0 | Status: SHIPPED | OUTPATIENT
Start: 2023-05-15

## 2023-08-01 ENCOUNTER — OFFICE VISIT (OUTPATIENT)
Dept: CARDIOLOGY | Facility: CLINIC | Age: 63
End: 2023-08-01
Payer: COMMERCIAL

## 2023-08-01 VITALS
OXYGEN SATURATION: 98 % | BODY MASS INDEX: 33.66 KG/M2 | HEIGHT: 65 IN | DIASTOLIC BLOOD PRESSURE: 88 MMHG | SYSTOLIC BLOOD PRESSURE: 126 MMHG | WEIGHT: 202 LBS | HEART RATE: 83 BPM

## 2023-08-01 DIAGNOSIS — E78.00 HYPERCHOLESTEROLEMIA: ICD-10-CM

## 2023-08-01 DIAGNOSIS — I10 PRIMARY HYPERTENSION: ICD-10-CM

## 2023-08-01 DIAGNOSIS — I25.10 CORONARY ARTERY DISEASE INVOLVING NATIVE CORONARY ARTERY OF NATIVE HEART WITHOUT ANGINA PECTORIS: Primary | ICD-10-CM

## 2023-08-01 PROCEDURE — 99214 OFFICE O/P EST MOD 30 MIN: CPT | Performed by: INTERNAL MEDICINE

## 2023-08-01 RX ORDER — EMPAGLIFLOZIN 10 MG/1
1 TABLET, FILM COATED ORAL DAILY
COMMUNITY
Start: 2023-07-14

## 2023-08-01 RX ORDER — TIOTROPIUM BROMIDE AND OLODATEROL 3.124; 2.736 UG/1; UG/1
2 SPRAY, METERED RESPIRATORY (INHALATION) DAILY
COMMUNITY

## 2023-08-01 RX ORDER — DULAGLUTIDE 0.75 MG/.5ML
INJECTION, SOLUTION SUBCUTANEOUS WEEKLY
COMMUNITY

## 2023-08-15 RX ORDER — CHLORTHALIDONE 25 MG/1
12.5 TABLET ORAL DAILY
Qty: 45 TABLET | Refills: 1 | Status: SHIPPED | OUTPATIENT
Start: 2023-08-15

## 2023-08-21 ENCOUNTER — APPOINTMENT (OUTPATIENT)
Dept: GENERAL RADIOLOGY | Facility: HOSPITAL | Age: 63
End: 2023-08-21
Payer: COMMERCIAL

## 2023-08-21 LAB
ALBUMIN SERPL-MCNC: 4.1 G/DL (ref 3.5–5.2)
ALBUMIN/GLOB SERPL: 1.4 G/DL
ALP SERPL-CCNC: 76 U/L (ref 39–117)
ALT SERPL W P-5'-P-CCNC: 19 U/L (ref 1–33)
ANION GAP SERPL CALCULATED.3IONS-SCNC: 14 MMOL/L (ref 5–15)
AST SERPL-CCNC: 18 U/L (ref 1–32)
BASOPHILS # BLD AUTO: 0.05 10*3/MM3 (ref 0–0.2)
BASOPHILS NFR BLD AUTO: 0.6 % (ref 0–1.5)
BILIRUB SERPL-MCNC: 0.8 MG/DL (ref 0–1.2)
BUN SERPL-MCNC: 11 MG/DL (ref 8–23)
BUN/CREAT SERPL: 19 (ref 7–25)
CALCIUM SPEC-SCNC: 10.4 MG/DL (ref 8.6–10.5)
CHLORIDE SERPL-SCNC: 99 MMOL/L (ref 98–107)
CO2 SERPL-SCNC: 26 MMOL/L (ref 22–29)
CREAT SERPL-MCNC: 0.58 MG/DL (ref 0.57–1)
D DIMER PPP FEU-MCNC: 0.29 MCGFEU/ML (ref 0–0.61)
DEPRECATED RDW RBC AUTO: 48.6 FL (ref 37–54)
EGFRCR SERPLBLD CKD-EPI 2021: 103.1 ML/MIN/1.73
EOSINOPHIL # BLD AUTO: 0.05 10*3/MM3 (ref 0–0.4)
EOSINOPHIL NFR BLD AUTO: 0.6 % (ref 0.3–6.2)
ERYTHROCYTE [DISTWIDTH] IN BLOOD BY AUTOMATED COUNT: 13.3 % (ref 12.3–15.4)
GLOBULIN UR ELPH-MCNC: 2.9 GM/DL
GLUCOSE SERPL-MCNC: 139 MG/DL (ref 65–99)
HCT VFR BLD AUTO: 40.9 % (ref 34–46.6)
HGB BLD-MCNC: 14.4 G/DL (ref 12–15.9)
IMM GRANULOCYTES # BLD AUTO: 0.07 10*3/MM3 (ref 0–0.05)
IMM GRANULOCYTES NFR BLD AUTO: 0.8 % (ref 0–0.5)
LIPASE SERPL-CCNC: 24 U/L (ref 13–60)
LYMPHOCYTES # BLD AUTO: 2.8 10*3/MM3 (ref 0.7–3.1)
LYMPHOCYTES NFR BLD AUTO: 32.2 % (ref 19.6–45.3)
MCH RBC QN AUTO: 35.3 PG (ref 26.6–33)
MCHC RBC AUTO-ENTMCNC: 35.2 G/DL (ref 31.5–35.7)
MCV RBC AUTO: 100.2 FL (ref 79–97)
MONOCYTES # BLD AUTO: 0.68 10*3/MM3 (ref 0.1–0.9)
MONOCYTES NFR BLD AUTO: 7.8 % (ref 5–12)
NEUTROPHILS NFR BLD AUTO: 5.04 10*3/MM3 (ref 1.7–7)
NEUTROPHILS NFR BLD AUTO: 58 % (ref 42.7–76)
NRBC BLD AUTO-RTO: 0 /100 WBC (ref 0–0.2)
NT-PROBNP SERPL-MCNC: 44.8 PG/ML (ref 0–900)
PLATELET # BLD AUTO: 303 10*3/MM3 (ref 140–450)
PMV BLD AUTO: 8.9 FL (ref 6–12)
POTASSIUM SERPL-SCNC: 3.7 MMOL/L (ref 3.5–5.2)
PROT SERPL-MCNC: 7 G/DL (ref 6–8.5)
RBC # BLD AUTO: 4.08 10*6/MM3 (ref 3.77–5.28)
SODIUM SERPL-SCNC: 139 MMOL/L (ref 136–145)
TROPONIN T SERPL HS-MCNC: 12 NG/L
WBC NRBC COR # BLD: 8.69 10*3/MM3 (ref 3.4–10.8)

## 2023-08-21 PROCEDURE — 93005 ELECTROCARDIOGRAM TRACING: CPT | Performed by: EMERGENCY MEDICINE

## 2023-08-21 PROCEDURE — 85025 COMPLETE CBC W/AUTO DIFF WBC: CPT | Performed by: EMERGENCY MEDICINE

## 2023-08-21 PROCEDURE — 83690 ASSAY OF LIPASE: CPT | Performed by: EMERGENCY MEDICINE

## 2023-08-21 PROCEDURE — 84484 ASSAY OF TROPONIN QUANT: CPT | Performed by: EMERGENCY MEDICINE

## 2023-08-21 PROCEDURE — 85379 FIBRIN DEGRADATION QUANT: CPT | Performed by: EMERGENCY MEDICINE

## 2023-08-21 PROCEDURE — 80053 COMPREHEN METABOLIC PANEL: CPT | Performed by: EMERGENCY MEDICINE

## 2023-08-21 PROCEDURE — 83880 ASSAY OF NATRIURETIC PEPTIDE: CPT | Performed by: EMERGENCY MEDICINE

## 2023-08-21 PROCEDURE — 71045 X-RAY EXAM CHEST 1 VIEW: CPT

## 2023-08-21 PROCEDURE — 99284 EMERGENCY DEPT VISIT MOD MDM: CPT

## 2023-08-21 PROCEDURE — 36415 COLL VENOUS BLD VENIPUNCTURE: CPT

## 2023-08-21 PROCEDURE — 93005 ELECTROCARDIOGRAM TRACING: CPT

## 2023-08-21 RX ORDER — SODIUM CHLORIDE 0.9 % (FLUSH) 0.9 %
10 SYRINGE (ML) INJECTION AS NEEDED
Status: DISCONTINUED | OUTPATIENT
Start: 2023-08-21 | End: 2023-08-22 | Stop reason: HOSPADM

## 2023-08-21 RX ORDER — ASPIRIN 81 MG/1
324 TABLET, CHEWABLE ORAL ONCE
Status: COMPLETED | OUTPATIENT
Start: 2023-08-21 | End: 2023-08-22

## 2023-08-22 ENCOUNTER — HOSPITAL ENCOUNTER (EMERGENCY)
Facility: HOSPITAL | Age: 63
Discharge: HOME | End: 2023-08-22
Attending: EMERGENCY MEDICINE | Admitting: EMERGENCY MEDICINE
Payer: COMMERCIAL

## 2023-08-22 VITALS
OXYGEN SATURATION: 94 % | DIASTOLIC BLOOD PRESSURE: 82 MMHG | SYSTOLIC BLOOD PRESSURE: 151 MMHG | HEIGHT: 65 IN | BODY MASS INDEX: 33.82 KG/M2 | WEIGHT: 203 LBS | RESPIRATION RATE: 16 BRPM | HEART RATE: 92 BPM | TEMPERATURE: 98 F

## 2023-08-22 DIAGNOSIS — R07.89 CHEST TIGHTNESS: Primary | ICD-10-CM

## 2023-08-22 LAB
GEN 5 2HR TROPONIN T REFLEX: 11 NG/L
HOLD SPECIMEN: NORMAL
QT INTERVAL: 352 MS
QT INTERVAL: 364 MS
QTC INTERVAL: 428 MS
QTC INTERVAL: 433 MS
TROPONIN T DELTA: -1 NG/L
WHOLE BLOOD HOLD COAG: NORMAL
WHOLE BLOOD HOLD SPECIMEN: NORMAL

## 2023-08-22 PROCEDURE — 36415 COLL VENOUS BLD VENIPUNCTURE: CPT

## 2023-08-22 PROCEDURE — 84484 ASSAY OF TROPONIN QUANT: CPT | Performed by: EMERGENCY MEDICINE

## 2023-08-22 RX ORDER — NITROGLYCERIN 0.4 MG/1
0.4 TABLET SUBLINGUAL
Qty: 25 TABLET | Refills: 0 | Status: SHIPPED | OUTPATIENT
Start: 2023-08-22

## 2023-08-22 RX ORDER — ASPIRIN 81 MG/1
81 TABLET ORAL DAILY
Qty: 30 TABLET | Refills: 0 | Status: SHIPPED | OUTPATIENT
Start: 2023-08-22

## 2023-08-22 RX ADMIN — ASPIRIN 243 MG: 81 TABLET, CHEWABLE ORAL at 03:48

## 2023-08-22 NOTE — ED PROVIDER NOTES
Subjective   History of Present Illness  63 year old very pleasant female with known history of diabetes and nonobstructive coronary artery disease followed by cardiologist Dr. Escamilla, presents to the emergency department with concerns about intermittent chest tightness for one day with associated facial flushing and elevated blood pressure. She tried aspirin 81 mg at 1700, diazepam 5 mg (her dog's med), and oral antacids prior to arrival without improvement in her symptoms, but her symptoms did resolve on the way to the emergency department. She is asymptomatic upon my initial assessment of the patient, which is in the triage area due to a full ER. She recently saw Dr. Escamilla on 8/1/23, and at that time she had easy bruising and a diffuse rash to all 4 extremities, and he discontinued her aspirin to see if it would help the rash. She has a dermatologist appointment in December but has not yet seen a dermatologist for the rash. She states she has not noticed any improvement in the rash since stopping the aspirin. Her blood pressure on a home wrist cuff prior to arrival today was 163/104 per patient. Other than the aspirin, she denies recent medication changes.    Review of Systems   Constitutional:  Negative for diaphoresis and fever.   HENT:  Negative for facial swelling.    Eyes:  Negative for photophobia and discharge.   Respiratory:  Negative for stridor.    Cardiovascular:  Positive for chest pain.   Neurological:  Negative for facial asymmetry and speech difficulty.     Past Medical History:   Diagnosis Date    Abnormal heart rhythm 10/14/2016    Arthritis 10/14/2016    CAD (coronary artery disease) 10/14/2016    Non-obstructive CAD    Chest pain 10/14/2016    Heart disease     Hypercholesterolemia 10/14/2016    Hypertension 10/14/2016    Pneumonia 12/2019    Sleep apnea    DM    Allergies   Allergen Reactions    Percocet [Oxycodone-Acetaminophen] Nausea And Vomiting       Past Surgical History:   Procedure  Laterality Date    OTHER SURGICAL HISTORY      CYST REMOVAL BREAST    OVARIAN CYST SURGERY      TUBAL ABDOMINAL LIGATION         Family History   Problem Relation Age of Onset    Other Other         BLOOD CLOTS    Cancer Other     Heart disease Other     Diabetes Other     Arthritis Other     Stroke Mother     Diabetes Mother     Heart disease Father     No Known Problems Sister     No Known Problems Brother     Cancer Maternal Grandmother     No Known Problems Sister        Social History     Socioeconomic History    Marital status:    Tobacco Use    Smoking status: Former     Packs/day: 1.00     Types: Cigarettes     Quit date:      Years since quittin.6    Smokeless tobacco: Never   Vaping Use    Vaping Use: Never used   Substance and Sexual Activity    Alcohol use: No    Drug use: No    Sexual activity: Defer           Objective   Physical Exam  Vitals and nursing note reviewed.   Constitutional:       General: She is not in acute distress.     Appearance: She is not diaphoretic.   Cardiovascular:      Rate and Rhythm: Normal rate and regular rhythm.      Heart sounds: Normal heart sounds. No murmur heard.    No friction rub. No gallop.   Pulmonary:      Effort: Pulmonary effort is normal.      Breath sounds: Normal breath sounds. No stridor. No decreased breath sounds.   Musculoskeletal:      Comments: No significant peripheral edema. No calf tenderness bilaterally.   Skin:     General: Skin is warm and dry.      Comments: Petechial rash to all 4 extremities. Palms clear bilaterally. Some areas are linear where she describes appropriate trauma.    Neurological:      Mental Status: She is alert.      Comments: Normal speech, no dysarthria. Moves all extremities. Normal gait. No ataxia.                  ED Course  ED Course as of 23e Aug 22, 2023   0340 Results and plan discussed with patient, all questions addressed.  Blood pressure has improved to 150/84.  Patient denies any  chest tightness at this time.  She is currently asymptomatic and requesting discharge home.  She states her last stress test was a few years ago and she has a history of diabetes, so I will have her follow-up with our chest pain follow-up pathway. [LD]      ED Course User Index  [LD] Norma Olivares MD                                           Medical Decision Making  Differential diagnosis includes acute coronary syndrome, GERD, esophageal spasm, accelerated hypertension with symptoms, pulmonary embolus, and anxiety would be considered as a diagnosis of exclusion.     Problems Addressed:  Chest tightness: complicated acute illness or injury    Amount and/or Complexity of Data Reviewed  External Data Reviewed: notes.     Details: I reviewed her cardiology office note from 8/1/23.  Labs: ordered. Decision-making details documented in ED Course.  Radiology: ordered. Decision-making details documented in ED Course.  ECG/medicine tests: ordered and independent interpretation performed. Decision-making details documented in ED Course.     Details: EKG at 2133 shows normal sinus rhythm at a rate of 89 beats per minute, normal intervals, nonspecific ST/T wave changes, no significant change compared to prior EKG from 8/6/22. Repeat EKG at 0114 shows normal sinus rhythm at a rate of 85 beats per minute, normal intervals, nonspecific ST/T wave changes, no significant dynamic EKG changes.    Risk  OTC drugs.  Prescription drug management.      Recent Results (from the past 24 hour(s))   ECG 12 Lead ED Triage Standing Order; Chest Pain    Collection Time: 08/21/23  9:33 PM   Result Value Ref Range    QT Interval 352 ms    QTC Interval 428 ms   High Sensitivity Troponin T    Collection Time: 08/21/23 11:10 PM    Specimen: Blood   Result Value Ref Range    HS Troponin T 12 (H) <10 ng/L   Comprehensive Metabolic Panel    Collection Time: 08/21/23 11:10 PM    Specimen: Blood   Result Value Ref Range    Glucose 139 (H) 65 - 99  mg/dL    BUN 11 8 - 23 mg/dL    Creatinine 0.58 0.57 - 1.00 mg/dL    Sodium 139 136 - 145 mmol/L    Potassium 3.7 3.5 - 5.2 mmol/L    Chloride 99 98 - 107 mmol/L    CO2 26.0 22.0 - 29.0 mmol/L    Calcium 10.4 8.6 - 10.5 mg/dL    Total Protein 7.0 6.0 - 8.5 g/dL    Albumin 4.1 3.5 - 5.2 g/dL    ALT (SGPT) 19 1 - 33 U/L    AST (SGOT) 18 1 - 32 U/L    Alkaline Phosphatase 76 39 - 117 U/L    Total Bilirubin 0.8 0.0 - 1.2 mg/dL    Globulin 2.9 gm/dL    A/G Ratio 1.4 g/dL    BUN/Creatinine Ratio 19.0 7.0 - 25.0    Anion Gap 14.0 5.0 - 15.0 mmol/L    eGFR 103.1 >60.0 mL/min/1.73   Lipase    Collection Time: 08/21/23 11:10 PM    Specimen: Blood   Result Value Ref Range    Lipase 24 13 - 60 U/L   BNP    Collection Time: 08/21/23 11:10 PM    Specimen: Blood   Result Value Ref Range    proBNP 44.8 0.0 - 900.0 pg/mL   Green Top (Gel)    Collection Time: 08/21/23 11:10 PM   Result Value Ref Range    Extra Tube Hold for add-ons.    Lavender Top    Collection Time: 08/21/23 11:10 PM   Result Value Ref Range    Extra Tube hold for add-on    Gold Top - SST    Collection Time: 08/21/23 11:10 PM   Result Value Ref Range    Extra Tube Hold for add-ons.    Gray Top    Collection Time: 08/21/23 11:10 PM   Result Value Ref Range    Extra Tube Hold for add-ons.    Light Blue Top    Collection Time: 08/21/23 11:10 PM   Result Value Ref Range    Extra Tube Hold for add-ons.    CBC Auto Differential    Collection Time: 08/21/23 11:10 PM    Specimen: Blood   Result Value Ref Range    WBC 8.69 3.40 - 10.80 10*3/mm3    RBC 4.08 3.77 - 5.28 10*6/mm3    Hemoglobin 14.4 12.0 - 15.9 g/dL    Hematocrit 40.9 34.0 - 46.6 %    .2 (H) 79.0 - 97.0 fL    MCH 35.3 (H) 26.6 - 33.0 pg    MCHC 35.2 31.5 - 35.7 g/dL    RDW 13.3 12.3 - 15.4 %    RDW-SD 48.6 37.0 - 54.0 fl    MPV 8.9 6.0 - 12.0 fL    Platelets 303 140 - 450 10*3/mm3    Neutrophil % 58.0 42.7 - 76.0 %    Lymphocyte % 32.2 19.6 - 45.3 %    Monocyte % 7.8 5.0 - 12.0 %    Eosinophil % 0.6 0.3  "- 6.2 %    Basophil % 0.6 0.0 - 1.5 %    Immature Grans % 0.8 (H) 0.0 - 0.5 %    Neutrophils, Absolute 5.04 1.70 - 7.00 10*3/mm3    Lymphocytes, Absolute 2.80 0.70 - 3.10 10*3/mm3    Monocytes, Absolute 0.68 0.10 - 0.90 10*3/mm3    Eosinophils, Absolute 0.05 0.00 - 0.40 10*3/mm3    Basophils, Absolute 0.05 0.00 - 0.20 10*3/mm3    Immature Grans, Absolute 0.07 (H) 0.00 - 0.05 10*3/mm3    nRBC 0.0 0.0 - 0.2 /100 WBC   D-dimer, Quantitative    Collection Time: 08/21/23 11:10 PM    Specimen: Blood   Result Value Ref Range    D-Dimer, Quantitative 0.29 0.00 - 0.61 MCGFEU/mL   ECG 12 Lead ED Triage Standing Order; Chest Pain    Collection Time: 08/22/23  1:14 AM   Result Value Ref Range    QT Interval 364 ms    QTC Interval 433 ms   High Sensitivity Troponin T 2Hr    Collection Time: 08/22/23  1:16 AM    Specimen: Blood   Result Value Ref Range    HS Troponin T 11 (H) <10 ng/L    Troponin T Delta -1 >=-4 - <+4 ng/L     Note: In addition to lab results from this visit, the labs listed above may include labs taken at another facility or during a different encounter within the last 24 hours. Please correlate lab times with ED admission and discharge times for further clarification of the services performed during this visit.    XR Chest 1 View   Final Result   No acute cardiopulmonary abnormality.      Electronically Signed: Brinda Benson MD     8/21/2023 9:58 PM EDT     Workstation ID: [id number]        Vitals:    08/21/23 2127 08/22/23 0056   BP: 157/85 155/99   Pulse: 93 95   Resp: 16    Temp: 98 øF (36.7 øC)    SpO2: 97% 97%   Weight: 92.1 kg (203 lb)    Height: 165.1 cm (65\")      Medications   sodium chloride 0.9 % flush 10 mL (has no administration in time range)   aspirin chewable tablet 324 mg (has no administration in time range)     ECG/EMG Results (last 24 hours)       Procedure Component Value Units Date/Time    ECG 12 Lead ED Triage Standing Order; Chest Pain [[id number]] Collected: 08/21/23 2133     Updated: " 08/21/23 2132     QT Interval 352 ms      QTC Interval 428 ms     Narrative:      Test Reason : ED Triage Standing Order~  Blood Pressure :   */*   mmHG  Vent. Rate :  89 BPM     Atrial Rate :  89 BPM     P-R Int : 130 ms          QRS Dur :  66 ms      QT Int : 352 ms       P-R-T Axes :  68  67  42 degrees     QTc Int : 428 ms    Normal sinus rhythm  Low voltage QRS  Nonspecific ST abnormality  Abnormal ECG  When compared with ECG of 26-AUG-2022 02:50,  Borderline criteria for Anterior infarct are no longer present    Referred By: EDMD           Confirmed By:     ECG 12 Lead ED Triage Standing Order; Chest Pain [[id number]] Collected: 08/22/23 0114     Updated: 08/22/23 0113     QT Interval 364 ms      QTC Interval 433 ms     Narrative:      Test Reason : ED Triage Standing Order~  Blood Pressure :   */*   mmHG  Vent. Rate :  85 BPM     Atrial Rate :  85 BPM     P-R Int : 132 ms          QRS Dur :  64 ms      QT Int : 364 ms       P-R-T Axes :  52  57  16 degrees     QTc Int : 433 ms    Normal sinus rhythm  Low voltage QRS  Nonspecific ST abnormality  Abnormal ECG  When compared with ECG of 21-AUG-2023 21:33, (Unconfirmed)  Previous ECG has undetermined rhythm, needs review    Referred By: ALIS           Confirmed By:           ECG 12 Lead ED Triage Standing Order; Chest Pain   Preliminary Result   Test Reason : ED Triage Standing Order~   Blood Pressure :   */*   mmHG   Vent. Rate :  85 BPM     Atrial Rate :  85 BPM      P-R Int : 132 ms          QRS Dur :  64 ms       QT Int : 364 ms       P-R-T Axes :  52  57  16 degrees      QTc Int : 433 ms      Normal sinus rhythm   Low voltage QRS   Nonspecific ST abnormality   Abnormal ECG   When compared with ECG of 21-AUG-2023 21:33, (Unconfirmed)   Previous ECG has undetermined rhythm, needs review      Referred By: ALIS           Confirmed By:       ECG 12 Lead ED Triage Standing Order; Chest Pain   Preliminary Result   Test Reason : ED Triage Standing Order~   Blood  Pressure :   */*   mmHG   Vent. Rate :  89 BPM     Atrial Rate :  89 BPM      P-R Int : 130 ms          QRS Dur :  66 ms       QT Int : 352 ms       P-R-T Axes :  68  67  42 degrees      QTc Int : 428 ms      Normal sinus rhythm   Low voltage QRS   Nonspecific ST abnormality   Abnormal ECG   When compared with ECG of 26-AUG-2022 02:50,   Borderline criteria for Anterior infarct are no longer present      Referred By: EDMD           Confirmed By:               Final diagnoses:   Chest tightness       ED Disposition  ED Disposition       ED Disposition   Discharge    Condition   Stable    Comment   --               [facility]  [address]  [address]  [facility] [id number] [phone]  Schedule an appointment as soon as possible for a visit in 1 week  They should be contacting you to schedule a follow-up appointment within the next week or so, but if you do not hear from them by Thursday midday, give them a call to schedule a follow-up appointment for reevaluation, and possible outpatient stress test.    Romulo Costa, APRN  [address] [place] [address] [phone]    Schedule an appointment as soon as possible for a visit in 1 week  Primary care provider, blood pressure recheck.    Call your dermatologist to see if you can be seen in follow-up for your rash sooner than December as scheduled.               Medication List        New Prescriptions      aspirin 81 MG EC tablet  Take 1 tablet by mouth Daily.     nitroglycerin 0.4 MG SL tablet  Commonly known as: NITROSTAT  Place 1 tablet under the tongue Every 5 (Five) Minutes As Needed for Chest Pain. Take no more than 3 doses in 15 minutes.               Where to Get Your Medications        These medications were sent to [id number] - [place] - [address] AT [facility] [address] - [phone]  - [phone] FX  [address] [place] [address]      Phone: [phone]    aspirin 81 MG EC tablet  nitroglycerin 0.4 MG SL tablet            Norma Olivares MD  08/23/23 9261

## 2023-08-22 NOTE — DISCHARGE INSTRUCTIONS
Return to the emergency department if your symptoms return and do not resolve after nitroglycerin as prescribed.

## 2023-08-30 ENCOUNTER — TELEPHONE (OUTPATIENT)
Dept: CARDIOLOGY | Facility: CLINIC | Age: 63
End: 2023-08-30
Payer: COMMERCIAL

## 2023-08-30 NOTE — TELEPHONE ENCOUNTER
Wanted you to know that she went to the ED at [facility] last week with chest pain. Gave her NTG and ASA 81 mg. Had mid sternal chest pain/tightness earlier today. Lasted 5 minutes. Did not take a NTG.B/P 104/77.Requesting an appt. Please advise.

## 2023-08-31 ENCOUNTER — OFFICE VISIT (OUTPATIENT)
Dept: CARDIOLOGY | Facility: CLINIC | Age: 63
End: 2023-08-31
Payer: COMMERCIAL

## 2023-08-31 VITALS
DIASTOLIC BLOOD PRESSURE: 74 MMHG | WEIGHT: 206 LBS | SYSTOLIC BLOOD PRESSURE: 114 MMHG | BODY MASS INDEX: 34.32 KG/M2 | HEART RATE: 74 BPM | HEIGHT: 65 IN | OXYGEN SATURATION: 98 %

## 2023-08-31 DIAGNOSIS — G47.33 OBSTRUCTIVE SLEEP APNEA: Primary | ICD-10-CM

## 2023-08-31 PROCEDURE — 99213 OFFICE O/P EST LOW 20 MIN: CPT | Performed by: NURSE PRACTITIONER

## 2023-08-31 NOTE — PROGRESS NOTES
Follow-Up Sleep Consult     Date:   2023  Name: Bri Boyce  :   1960  PCP: Romulo Costa APRN    Chief Complaint   Patient presents with    Sleep Apnea    Establish Care       Subjective     History of Present Illness  Bri Boyce is a 63 y.o. female who presents today for new patient evaluation for HARMONY.  Patient was recently diagnosed with mild HARMONY on home sleep study from 2023.  Baseline AHI was 8.4, supine AHI 12.8.  She was started on PAP therapy by her pulmonologist but has not had a follow-up visit.  She is here today for 31-90-day compliance visit.  Patient reports that she is doing well on PAP therapy.  She feels like her sleep is more restorative and feels like she has more energy throughout the day.  She denies excessive daytime sleepiness or fatigue.  She sometimes takes the machine off at night without realizing it.  She uses it consistently every night. Download reviewed and interpreted today.  Compliance is 88%, when used >4 hours is 69%.  AHI is 0.9.  She is only here today for HARMONY and not cardiac care.  Her primary cardiologist is Dr. Escamilla.    Mild HARMONY with baseline AHI of 8.4, supine AHI 12.8 on HST from 2023.    History of HARMONY with a baseline AHI of 8.4.     Current Treatment: AutoCPAP 6-20cm   Current mask used is full face mask  Device Functioning Well: Yes  Mask Fit Comfortable: Yes  Air Flow Comfortable: Yes  DME Helpful for Supplies: Yes  Sleep is rested: Yes  AHI on download 0.9    The patient's relevant past medical, surgical, family, and social history reviewed and updated in Epic as appropriate.    Past Medical History:   Diagnosis Date    Abnormal heart rhythm 10/14/2016    Arthritis 10/14/2016    CAD (coronary artery disease) 10/14/2016    Non-obstructive CAD    Chest pain 10/14/2016    Heart disease     Hypercholesterolemia 10/14/2016    Hypertension 10/14/2016    Pneumonia 2019    Sleep apnea      Past Surgical History:    Procedure Laterality Date    OTHER SURGICAL HISTORY      CYST REMOVAL BREAST    OVARIAN CYST SURGERY      TUBAL ABDOMINAL LIGATION       OB History    No obstetric history on file.       Allergies   Allergen Reactions    Percocet [Oxycodone-Acetaminophen] Nausea And Vomiting     Prior to Admission medications    Medication Sig Start Date End Date Taking? Authorizing Provider   albuterol sulfate  (90 Base) MCG/ACT inhaler Inhale 2 puffs Every 4 (Four) Hours As Needed for Wheezing.   Yes Kenton Bryan MD   aspirin 81 MG EC tablet Take 1 tablet by mouth Daily. 8/22/23  Yes Norma Olivares MD   atorvastatin (LIPITOR) 80 MG tablet Take 1 tablet by mouth Daily. 7/20/15  Yes Kenton Bryan MD   chlorthalidone (HYGROTON) 25 MG tablet Take 0.5 tablets by mouth Daily. 8/15/23  Yes Sven Escamilla III, MD   Diphenhydramine-APAP, sleep, (TYLENOL PM EXTRA STRENGTH PO) Take 1 tablet by mouth As Needed. 12/15/15  Yes Kenton Bryan MD   lisinopril (PRINIVIL,ZESTRIL) 5 MG tablet Take 1 tablet by mouth Daily. 5/24/22  Yes Kenton Bryan MD   metoprolol tartrate (LOPRESSOR) 25 MG tablet TAKE 1 TABLET BY MOUTH TWICE DAILY 10/17/22  Yes Sven Escamilla III, MD   montelukast (SINGULAIR) 10 MG tablet Take 1 tablet by mouth Daily. 10/22/21  Yes Kenton Bryan MD   nitroglycerin (NITROSTAT) 0.4 MG SL tablet Place 1 tablet under the tongue Every 5 (Five) Minutes As Needed for Chest Pain. Take no more than 3 doses in 15 minutes. 8/22/23  Yes Norma Olivares MD   Stiolto Respimat 2.5-2.5 MCG/ACT aerosol solution inhaler Inhale 2 puffs Daily.   Yes Kenton Bryan MD   tamsulosin (FLOMAX) 0.4 MG capsule 24 hr capsule Take 1 capsule by mouth Daily.   Yes Kenton Bryan MD   Trulicity 0.75 MG/0.5ML solution pen-injector 1 (One) Time Per Week.   Yes Kenton Bryan MD   Unable to find 1 each 1 (One) Time. Med Name: c pap machine at night   Yes Kenton Bryan MD   baclofen  "(LIORESAL) 10 MG tablet START 1/2 TABLET AT BEDTIME THEN 1/2 TABLET THREE TIMES A DAY AS NEEDED FOR SPASMS, IF TOLERATED MAY TAKE 1 TABLET THREE TIMES DAILY AS NEEDED. 12/13/22 8/31/23  Marti Abdullahi APRN   Jardiance 10 MG tablet tablet Take 1 tablet by mouth Daily.  Patient not taking: Reported on 8/1/2023 7/14/23 8/31/23  Provider, MD Kenton   meloxicam (MOBIC) 7.5 MG tablet TAKE 1 TABLET BY MOUTH TWICE DAILY AS NEEDED FOR MODERATE PAIN 1/9/23 8/31/23  Marti Abdullahi APRN   metoprolol tartrate (LOPRESSOR) 25 MG tablet Take 1 tablet by mouth Every 8 (Eight) Hours As Needed (palpitations). 11/23/21 8/31/23  Sven Escamilla III, MD     Family History   Problem Relation Age of Onset    Other Other         BLOOD CLOTS    Cancer Other     Heart disease Other     Diabetes Other     Arthritis Other     Stroke Mother     Diabetes Mother     Heart disease Father     No Known Problems Sister     No Known Problems Brother     Cancer Maternal Grandmother     No Known Problems Sister        Objective     Vital Signs:  /74   Pulse 74   Ht 165.1 cm (65\")   Wt 93.4 kg (206 lb)   SpO2 98%   BMI 34.28 kg/mý     BMI is >= 30 and <35. (Class 1 Obesity). The following options were offered after discussion;: exercise counseling/recommendations        Physical Exam  Vitals reviewed.   Constitutional:       Appearance: Normal appearance.   HENT:      Head: Normocephalic.   Cardiovascular:      Rate and Rhythm: Normal rate and regular rhythm.      Heart sounds: Normal heart sounds.   Pulmonary:      Effort: Pulmonary effort is normal.      Breath sounds: Normal breath sounds.   Musculoskeletal:      Right lower leg: No edema.      Left lower leg: No edema.   Skin:     General: Skin is warm and dry.      Capillary Refill: Capillary refill takes less than 2 seconds.   Neurological:      General: No focal deficit present.      Mental Status: She is alert and oriented to person, place, and time.   Psychiatric:         Mood " and Affect: Mood normal.         Behavior: Behavior normal.           PAP download reviewed: 7/3/23-8/29/23         Assessment and Plan     Diagnoses and all orders for this visit:    1. Obstructive sleep apnea (Primary)  Assessment & Plan:  Patient was recently diagnosed with mild HARMONY on home sleep study from 6/5/2023.  Baseline AHI was 8.4, supine AHI 12.8.  She was started on PAP therapy by her pulmonologist but has not had a follow-up visit.  She is here today for 31-90-day compliance visit.  Patient reports that she is doing well on PAP therapy.  She feels like her sleep is more restorative and feels like she has more energy throughout the day.  She denies excessive daytime sleepiness or fatigue.  She sometimes takes the machine off at night without realizing it.  She uses it consistently every night  Download reviewed and interpreted today.  Compliance is 88%, when used >4 hours is 69%.  AHI is 0.9.    Continue PAP therapy at current settings.  -Follow up in 1 month (before 10/1/23 for compliance visit).     Orders:  -     PAP Therapy        Report if any new/changing symptoms immediately, Sleep risks reviewed (driving, medical, sleep death, sedating agents), Sleep hygiene discussed, and Increase pap therapy usage         Follow Up  Return in about 6 months (around 2/29/2024) for HARMONY.  Patient was given instructions and counseling regarding her condition or for health maintenance advice. Please see specific information pulled into the AVS if appropriate.

## 2023-08-31 NOTE — ASSESSMENT & PLAN NOTE
Patient was recently diagnosed with mild HARMONY on home sleep study from 6/5/2023.  Baseline AHI was 8.4, supine AHI 12.8.  She was started on PAP therapy by her pulmonologist but has not had a follow-up visit.  She is here today for 31-90-day compliance visit.  Patient reports that she is doing well on PAP therapy.  She feels like her sleep is more restorative and feels like she has more energy throughout the day.  She denies excessive daytime sleepiness or fatigue.  She sometimes takes the machine off at night without realizing it.  She uses it consistently every night  Download reviewed and interpreted today.  Compliance is 88%, when used >4 hours is 69%.  AHI is 0.9.    Continue PAP therapy at current settings.  -Follow up in 1 month (before 10/1/23 for compliance visit).

## 2023-09-05 ENCOUNTER — OFFICE VISIT (OUTPATIENT)
Dept: CARDIOLOGY | Facility: CLINIC | Age: 63
End: 2023-09-05
Payer: COMMERCIAL

## 2023-09-05 VITALS
HEIGHT: 65 IN | WEIGHT: 206 LBS | OXYGEN SATURATION: 98 % | HEART RATE: 74 BPM | SYSTOLIC BLOOD PRESSURE: 112 MMHG | BODY MASS INDEX: 34.32 KG/M2 | DIASTOLIC BLOOD PRESSURE: 70 MMHG

## 2023-09-05 DIAGNOSIS — I25.10 CORONARY ARTERY DISEASE INVOLVING NATIVE CORONARY ARTERY OF NATIVE HEART WITHOUT ANGINA PECTORIS: Primary | ICD-10-CM

## 2023-09-05 DIAGNOSIS — I10 PRIMARY HYPERTENSION: ICD-10-CM

## 2023-09-05 DIAGNOSIS — I20.8 ANGINA AT REST: ICD-10-CM

## 2023-09-05 DIAGNOSIS — E78.00 HYPERCHOLESTEROLEMIA: ICD-10-CM

## 2023-09-05 PROCEDURE — 99214 OFFICE O/P EST MOD 30 MIN: CPT | Performed by: INTERNAL MEDICINE

## 2023-09-05 RX ORDER — ISOSORBIDE MONONITRATE 30 MG/1
30 TABLET, EXTENDED RELEASE ORAL DAILY
Qty: 30 TABLET | Refills: 11 | Status: SHIPPED | OUTPATIENT
Start: 2023-09-05

## 2023-09-05 NOTE — PROGRESS NOTES
[facility] at [facility]  Office visit  Bri Boyce  1960  [phone]    VISIT DATE:  9/5/2023      PCP: Romulo Costa, APRN  [address] [place] [address]    CC:  Chief Complaint   Patient presents with    Coronary Artery Disease    Follow-up     ED visit       PROBLEM LIST:  Non-obstructive CAD  Left  heart catheterization, October 2011, 40% blockage off the circumflex  and 30% blockage of the LAD.  40% blockage of the RCA.  Echocardiogram, 04/09/2015: EF 55% to 60% with no regional wall motion abnormalities.  Exercise Cardiolite, 04/09/2015: Normal exercise Cardiolite with decreased exercise capacity.  Hypertension   Hypercholesterolemia  Chest pain  Abnormal heart rhythm  Arthritis  Past surgeries:    Cyst removed from ovary.  Cyst removed from right breast.  Tubal ligation    Previous cardiac studies and procedures:  June 2020  2-week ambulatory ECG monitor  A relatively benign monitor study.  Shortness of air correlates with sinus rhythm. Palpitations correlate with PACs and short bursts of nonsustained atrial tachycardia.  Exercise myocardial perfusion imaging  Mild mild calcifications visualized in the mid LAD and proximal circumflex.  Pt denied any chest pain or discomfort, did report significant SOA (O2 sat 93-98%) during stress.  Expected exercise time: 7:05, actual time: 4:00  No ECG evidence of myocardial ischemia  Left ventricular ejection fraction is hyperdynamic (Calculated EF > 70%).  Myocardial perfusion imaging indicates a normal myocardial perfusion study with no evidence of ischemia.  Impressions are consistent with a low risk study.  Echo  Estimated EF appears to be in the range of 56 - 60%.  Left ventricular diastolic dysfunction (grade II) consistent with pseudonormalization.    December 2022  Carotid duplex    Proximal right internal carotid artery plaque without significant stenosis.    Right internal carotid artery stenosis of 0-49%.    Proximal  "left internal carotid artery plaque without significant stenosis.    Left internal carotid artery stenosis of 0-49%.    ASSESSMENT:   Diagnosis Plan   1. Coronary artery disease involving native coronary artery of native heart without angina pectoris        2. Hypercholesterolemia        3. Primary hypertension            PLAN:  Coronary artery disease, nonobstructive: Recent chest discomfort potentially concerning for vasospastic angina.  Myocardial perfusion imaging pending for ischemia evaluation.  Starting Imdur 30 mg p.o. daily.  Continue as needed sublingual nitroglycerin.    Hyperlipidemia: Goal LDL less than 100.  Continue atorvastatin 80 mg by mouth daily. annual lipid panel and CMP.  Encouraged regular exercise and dietary modification.    Hypertension: Goal less than 130/80 mmHg.  Currently well-controlled.  Continue current medical therapy, ACE inhibitor and thiazide diuretic.    Symptomatic PACs and PVCs: continue metoprolol tartrate 25 mg p.o. twice daily.    Diastolic dysfunction, grade 2: Currently euvolemic.  Continue current medical therapy.  Encouraged dietary modifications and regular exercise to achieve weight loss.  Improving functional capacity with regular exercise.      Subjective  Recent episode of precordial chest discomfort at rest.  Describes as a moderate pressure type sensation mid precordium, nonradiating.  Lasted for up to 15 minutes, and resolved and recurred.  Evaluation in the emergency department was only remarkable for mild anterior lateral repolarization abnormalities which were somewhat dynamic in nature.  She is walking on a regular basis without any induced angina.  Blood pressures running less than 130/80 mmHg.  Compliant with medical therapy.  Does report easy bruising.    PHYSICAL EXAMINATION:  Vitals:    09/05/23 1349   BP: 112/70   BP Location: Right arm   Patient Position: Sitting   Pulse: 74   SpO2: 98%   Weight: 93.4 kg (206 lb)   Height: 165.1 cm (65\")     General " Appearance:    Alert, cooperative, no distress, appears stated age   Head:    Normocephalic, without obvious abnormality, atraumatic   Eyes:    conjunctiva/corneas clear   Nose:   Nares normal, septum midline, mucosa normal, no drainage   Throat:   Lips, teeth and gums normal   Neck:   Supple, symmetrical, trachea midline, no carotid    bruit or JVD   Lungs:     Clear to auscultation bilaterally, respirations unlabored   Chest Wall:    No tenderness or deformity    Heart:    Regular rate and rhythm, S1 and S2 normal, no murmur, rub   or gallop, normal carotid impulse bilaterally without bruit.   Abdomen:     Soft, non-tender   Extremities:   Extremities normal, atraumatic, no cyanosis.  Trivial pretibial edema   Pulses:   2+ and symmetric all extremities   Skin:   Skin color, texture, turgor normal, no rashes or lesions       Diagnostic Data:  Procedures  Lab Results   Component Value Date    TRIG 131 11/23/2021    HDL 34 (L) 11/23/2021     Lab Results   Component Value Date    GLUCOSE 139 (H) 08/21/2023    BUN 11 08/21/2023    CREATININE 0.58 08/21/2023     08/21/2023    K 3.7 08/21/2023    CL 99 08/21/2023    CO2 26.0 08/21/2023     Lab Results   Component Value Date    HGBA1C 5.80 (H) 08/25/2022     Lab Results   Component Value Date    WBC 8.69 08/21/2023    HGB 14.4 08/21/2023    HCT 40.9 08/21/2023     08/21/2023       Allergies  Allergies   Allergen Reactions    Percocet [Oxycodone-Acetaminophen] Nausea And Vomiting       Current Medications    Current Outpatient Medications:     albuterol sulfate  (90 Base) MCG/ACT inhaler, Inhale 2 puffs Every 4 (Four) Hours As Needed for Wheezing., Disp: , Rfl:     aspirin 81 MG EC tablet, Take 1 tablet by mouth Daily., Disp: 30 tablet, Rfl: 0    atorvastatin (LIPITOR) 80 MG tablet, Take 1 tablet by mouth Daily., Disp: , Rfl:     chlorthalidone (HYGROTON) 25 MG tablet, Take 0.5 tablets by mouth Daily., Disp: 45 tablet, Rfl: 1    Diphenhydramine-APAP,  sleep, (TYLENOL PM EXTRA STRENGTH PO), Take 1 tablet by mouth As Needed., Disp: , Rfl:     lisinopril (PRINIVIL,ZESTRIL) 5 MG tablet, Take 1 tablet by mouth Daily., Disp: , Rfl:     metoprolol tartrate (LOPRESSOR) 25 MG tablet, TAKE 1 TABLET BY MOUTH TWICE DAILY, Disp: 180 tablet, Rfl: 1    montelukast (SINGULAIR) 10 MG tablet, Take 1 tablet by mouth Daily., Disp: , Rfl:     nitroglycerin (NITROSTAT) 0.4 MG SL tablet, Place 1 tablet under the tongue Every 5 (Five) Minutes As Needed for Chest Pain. Take no more than 3 doses in 15 minutes., Disp: 25 tablet, Rfl: 0    Stiolto Respimat 2.5-2.5 MCG/ACT aerosol solution inhaler, Inhale 2 puffs Daily., Disp: , Rfl:     tamsulosin (FLOMAX) 0.4 MG capsule 24 hr capsule, Take 1 capsule by mouth Daily., Disp: , Rfl:     Trulicity 0.75 MG/0.5ML solution pen-injector, 1 (One) Time Per Week., Disp: , Rfl:     Unable to find, 1 each 1 (One) Time. Med Name: c pap machine at night, Disp: , Rfl:           ROS  Review of Systems   Cardiovascular:  Negative for chest pain, claudication, dyspnea on exertion, irregular heartbeat, palpitations and syncope.   Respiratory:  Negative for cough, shortness of breath and wheezing.        SOCIAL HX  Social History     Socioeconomic History    Marital status:    Tobacco Use    Smoking status: Former     Packs/day: 1.00     Types: Cigarettes     Quit date:      Years since quittin.6    Smokeless tobacco: Never   Vaping Use    Vaping Use: Never used   Substance and Sexual Activity    Alcohol use: No    Drug use: No    Sexual activity: Defer       FAMILY HX  Family History   Problem Relation Age of Onset    Other Other         BLOOD CLOTS    Cancer Other     Heart disease Other     Diabetes Other     Arthritis Other     Stroke Mother     Diabetes Mother     Heart disease Father     No Known Problems Sister     No Known Problems Brother     Cancer Maternal Grandmother     No Known Problems Sister              Sven Escamilla III, MD,  FACC

## 2023-09-14 ENCOUNTER — TELEPHONE (OUTPATIENT)
Dept: CARDIOLOGY | Facility: CLINIC | Age: 63
End: 2023-09-14
Payer: COMMERCIAL

## 2023-09-14 RX ORDER — AMLODIPINE BESYLATE 2.5 MG/1
2.5 TABLET ORAL DAILY
Qty: 30 TABLET | Refills: 5 | Status: SHIPPED | OUTPATIENT
Start: 2023-09-14

## 2023-09-14 NOTE — TELEPHONE ENCOUNTER
[facility]      OUTPATIENT PHYSICAL THERAPY EVALUATION  PLAN OF TREATMENT FOR OUTPATIENT REHABILITATION  (COMPLETE FOR INITIAL CLAIMS ONLY)  Patient's Last Name, First Name, M.I.  YOB: 1963  Hina Yap                        Provider's Name  [facility] Medical Record No.  [id number]                               Onset Date:  09/17/22   Start of Care Date:    9/18/2022     Type:     _X_PT   ___OT   ___SLP Medical Diagnosis:  Dehydration                        PT Diagnosis:  impaired functional mobility   Visits from SOC:  1   _________________________________________________________________________________  Plan of Treatment/Functional Goals    Planned Interventions: balance training, bed mobility training, gait training, home exercise program, neuromuscular re-education, patient/family education, stair training, strengthening, transfer training, progressive activity/exercise     Goals: See Physical Therapy Goals on Care Plan in [employer] electronic health record.    Therapy Frequency: One time eval and treatment only  Predicted Duration of Therapy Intervention: 09/18/22  _________________________________________________________________________________    I CERTIFY THE NEED FOR THESE SERVICES FURNISHED UNDER        THIS PLAN OF TREATMENT AND WHILE UNDER MY CARE     (Physician co-signature of this document indicates review and certification of the therapy plan).               ,      Referring Physician: Teodora Samayoa APRN CNP            Initial Assessment        See Physical Therapy evaluation dated   in [employer] electronic health record.   Cannot take the Imdur 30 mg. Caused severe h/a's ,nausea and dizziness. Wants to know if she needs to have a replacement? Please advise.

## 2023-09-25 ENCOUNTER — TELEPHONE (OUTPATIENT)
Dept: CARDIOLOGY | Facility: CLINIC | Age: 63
End: 2023-09-25

## 2023-09-25 NOTE — TELEPHONE ENCOUNTER
Called patient and explained about how often patients normally come into the office that is on a cpap machine.

## 2023-09-25 NOTE — TELEPHONE ENCOUNTER
Caller: Bri Boyce    Relationship: Self    Best call back number: [phone]     What is the best time to reach you: ANYTIME     What was the call regarding: PT WAS WONDERING WHAT HER ROUTINE FOLLOW UPS WILL LOOK LIKE FOR THIS OFFICE, LIKE HOW OFTEN SHE WILL NEED TO BE SEEN. PLEASE ADVISE.     Is it okay if the provider responds through MyChart: CALL

## 2023-10-12 ENCOUNTER — HOSPITAL ENCOUNTER (OUTPATIENT)
Dept: CARDIOLOGY | Facility: HOSPITAL | Age: 63
Discharge: HOME | End: 2023-10-12
Admitting: INTERNAL MEDICINE
Payer: COMMERCIAL

## 2023-10-12 VITALS
HEIGHT: 65 IN | BODY MASS INDEX: 34.31 KG/M2 | WEIGHT: 205.91 LBS | HEART RATE: 96 BPM | SYSTOLIC BLOOD PRESSURE: 110 MMHG | DIASTOLIC BLOOD PRESSURE: 74 MMHG

## 2023-10-12 DIAGNOSIS — I20.89 ANGINA AT REST: ICD-10-CM

## 2023-10-12 PROCEDURE — A9500 TC99M SESTAMIBI: HCPCS | Performed by: INTERNAL MEDICINE

## 2023-10-12 PROCEDURE — 93017 CV STRESS TEST TRACING ONLY: CPT

## 2023-10-12 PROCEDURE — 0 TECHNETIUM SESTAMIBI: Performed by: INTERNAL MEDICINE

## 2023-10-12 PROCEDURE — 78452 HT MUSCLE IMAGE SPECT MULT: CPT

## 2023-10-12 RX ADMIN — TECHNETIUM TC 99M SESTAMIBI 1 DOSE: 1 INJECTION INTRAVENOUS at 10:20

## 2023-10-12 RX ADMIN — TECHNETIUM TC 99M SESTAMIBI 1 DOSE: 1 INJECTION INTRAVENOUS at 08:23

## 2023-10-13 ENCOUNTER — TELEPHONE (OUTPATIENT)
Dept: CARDIOLOGY | Facility: CLINIC | Age: 63
End: 2023-10-13
Payer: COMMERCIAL

## 2023-10-13 LAB
BH CV REST NUCLEAR ISOTOPE DOSE: 9.6 MCI
BH CV STRESS BP STAGE 1: NORMAL
BH CV STRESS DURATION MIN STAGE 1: 3
BH CV STRESS DURATION MIN STAGE 2: 1
BH CV STRESS DURATION SEC STAGE 1: 0
BH CV STRESS DURATION SEC STAGE 2: 25
BH CV STRESS GRADE STAGE 1: 10
BH CV STRESS GRADE STAGE 2: 12
BH CV STRESS HR STAGE 1: 142
BH CV STRESS HR STAGE 2: 153
BH CV STRESS METS STAGE 1: 5
BH CV STRESS METS STAGE 2: 7.5
BH CV STRESS NUCLEAR ISOTOPE DOSE: 32.7 MCI
BH CV STRESS O2 STAGE 1: 98
BH CV STRESS O2 STAGE 2: 98
BH CV STRESS PROTOCOL 1: NORMAL
BH CV STRESS RECOVERY BP: NORMAL MMHG
BH CV STRESS RECOVERY HR: 108 BPM
BH CV STRESS RECOVERY O2: 100 %
BH CV STRESS SPEED STAGE 1: 1.7
BH CV STRESS SPEED STAGE 2: 2.5
BH CV STRESS STAGE 1: 1
BH CV STRESS STAGE 2: 2
LV EF NUC BP: 69 %
MAXIMAL PREDICTED HEART RATE: 159 BPM
PERCENT MAX PREDICTED HR: 98.74 %
STRESS BASELINE BP: NORMAL MMHG
STRESS BASELINE HR: 96 BPM
STRESS O2 SAT REST: 97 %
STRESS PERCENT HR: 116 %
STRESS POST ESTIMATED WORKLOAD: 4.6 METS
STRESS POST EXERCISE DUR MIN: 4 MIN
STRESS POST EXERCISE DUR SEC: 25 SEC
STRESS POST O2 SAT PEAK: 98 %
STRESS POST PEAK BP: NORMAL MMHG
STRESS POST PEAK HR: 157 BPM
STRESS TARGET HR: 135 BPM

## 2023-10-13 NOTE — TELEPHONE ENCOUNTER
----- Message from Sven Escamilla III, MD sent at 10/13/2023 10:56 AM EDT -----  Normal stress test.

## 2023-10-24 ENCOUNTER — OFFICE VISIT (OUTPATIENT)
Dept: CARDIOLOGY | Facility: CLINIC | Age: 63
End: 2023-10-24
Payer: COMMERCIAL

## 2023-10-24 VITALS
WEIGHT: 205 LBS | BODY MASS INDEX: 34.16 KG/M2 | DIASTOLIC BLOOD PRESSURE: 68 MMHG | HEART RATE: 80 BPM | OXYGEN SATURATION: 97 % | SYSTOLIC BLOOD PRESSURE: 116 MMHG | HEIGHT: 65 IN

## 2023-10-24 DIAGNOSIS — I25.10 CORONARY ARTERY DISEASE INVOLVING NATIVE CORONARY ARTERY OF NATIVE HEART WITHOUT ANGINA PECTORIS: Primary | ICD-10-CM

## 2023-10-24 DIAGNOSIS — E78.00 HYPERCHOLESTEROLEMIA: ICD-10-CM

## 2023-10-24 DIAGNOSIS — I10 PRIMARY HYPERTENSION: ICD-10-CM

## 2023-10-24 PROCEDURE — 99214 OFFICE O/P EST MOD 30 MIN: CPT | Performed by: INTERNAL MEDICINE

## 2023-10-24 RX ORDER — CONJUGATED ESTROGENS 0.62 MG/G
CREAM VAGINAL AS NEEDED
COMMUNITY

## 2023-10-24 RX ORDER — CYCLOBENZAPRINE HCL 10 MG
TABLET ORAL AS NEEDED
COMMUNITY
Start: 2023-09-27

## 2023-10-24 NOTE — PROGRESS NOTES
[facility] at [facility]  Office visit  Bri Boyce  1960  [phone]    VISIT DATE:  10/24/2023      PCP: Romulo Costa, APRN  [address] [place] [address]    CC:  Chief Complaint   Patient presents with    Coronary Artery Disease     Follow up from stress test          PROBLEM LIST:  Non-obstructive CAD  Left  heart catheterization, October 2011, 40% blockage off the circumflex  and 30% blockage of the LAD.  40% blockage of the RCA.  Echocardiogram, 04/09/2015: EF 55% to 60% with no regional wall motion abnormalities.  Exercise Cardiolite, 04/09/2015: Normal exercise Cardiolite with decreased exercise capacity.  Hypertension   Hypercholesterolemia  Chest pain  Abnormal heart rhythm  Arthritis  Past surgeries:    Cyst removed from ovary.  Cyst removed from right breast.  Tubal ligation    Previous cardiac studies and procedures:  June 2020  2-week ambulatory ECG monitor  A relatively benign monitor study.  Shortness of air correlates with sinus rhythm. Palpitations correlate with PACs and short bursts of nonsustained atrial tachycardia.  Exercise myocardial perfusion imaging  Mild mild calcifications visualized in the mid LAD and proximal circumflex.  Pt denied any chest pain or discomfort, did report significant SOA (O2 sat 93-98%) during stress.  Expected exercise time: 7:05, actual time: 4:00  No ECG evidence of myocardial ischemia  Left ventricular ejection fraction is hyperdynamic (Calculated EF > 70%).  Myocardial perfusion imaging indicates a normal myocardial perfusion study with no evidence of ischemia.  Impressions are consistent with a low risk study.  Echo  Estimated EF appears to be in the range of 56 - 60%.  Left ventricular diastolic dysfunction (grade II) consistent with pseudonormalization.    December 2022  Carotid duplex    Proximal right internal carotid artery plaque without significant stenosis.    Right internal carotid artery stenosis of 0-49%.     Proximal left internal carotid artery plaque without significant stenosis.    Left internal carotid artery stenosis of 0-49%.    October 2023 exercise myocardial perfusion imaging    Patient denied chest pain/discomfort during exercise. Pt did c/o SOA (SP02 WNL on RA) that resolved in recovery.    Expected exercise duration = 6:45. Actual exercise duration = 4:25. Pt stopped due to SOA.    Equivocal ECG evidence of myocardial ischemia.    Mild three-vessel coronary calcifications.    Left ventricular ejection fraction is normal (Calculated EF = 69%).    Myocardial perfusion imaging indicates a normal myocardial perfusion study with no evidence of ischemia.    Impressions are consistent with a low risk study.    ASSESSMENT:   Diagnosis Plan   1. Coronary artery disease involving native coronary artery of native heart without angina pectoris        2. Hypercholesterolemia        3. Primary hypertension            PLAN:  Coronary artery disease, nonobstructive: Reassuring perfusion imaging, limitations appear to be pulmonary in nature.  Continue current medical therapy.    Hyperlipidemia: Goal LDL less than 100.  Continue atorvastatin 80 mg by mouth daily. annual lipid panel and CMP.  Encouraged regular exercise and dietary modification.    Hypertension: Goal less than 130/80 mmHg.  Currently well-controlled.  Continue current medical therapy, ACE inhibitor and thiazide diuretic.    Symptomatic PACs and PVCs: continue metoprolol tartrate 25 mg p.o. twice daily.    Diastolic dysfunction, grade 2: Currently euvolemic.  Continue current medical therapy.  Encouraged dietary modifications and regular exercise to achieve weight loss.  Improving functional capacity with regular exercise.      Subjective  Stable shortness of breath in a class II pattern, in particular walking up inclines.  Blood pressures running less than 130/80 mmHg.  Compliant with medical therapy.  Does report easy bruising.    PHYSICAL  "EXAMINATION:  Vitals:    10/24/23 1019   BP: 116/68   BP Location: Right arm   Patient Position: Sitting   Pulse: 80   SpO2: 97%   Weight: 93 kg (205 lb)   Height: 165.1 cm (65\")     General Appearance:    Alert, cooperative, no distress, appears stated age   Head:    Normocephalic, without obvious abnormality, atraumatic   Eyes:    conjunctiva/corneas clear   Nose:   Nares normal, septum midline, mucosa normal, no drainage   Throat:   Lips, teeth and gums normal   Neck:   Supple, symmetrical, trachea midline, no carotid    bruit or JVD   Lungs:     Clear to auscultation bilaterally, respirations unlabored   Chest Wall:    No tenderness or deformity    Heart:    Regular rate and rhythm, S1 and S2 normal, no murmur, rub   or gallop, normal carotid impulse bilaterally without bruit.   Abdomen:     Soft, non-tender   Extremities:   Extremities normal, atraumatic, no cyanosis.  Trivial pretibial edema   Pulses:   2+ and symmetric all extremities   Skin:   Skin color, texture, turgor normal, no rashes or lesions       Diagnostic Data:  Procedures  Lab Results   Component Value Date    TRIG 131 11/23/2021    HDL 34 (L) 11/23/2021     Lab Results   Component Value Date    GLUCOSE 139 (H) 08/21/2023    BUN 11 08/21/2023    CREATININE 0.58 08/21/2023     08/21/2023    K 3.7 08/21/2023    CL 99 08/21/2023    CO2 26.0 08/21/2023     Lab Results   Component Value Date    HGBA1C 5.80 (H) 08/25/2022     Lab Results   Component Value Date    WBC 8.69 08/21/2023    HGB 14.4 08/21/2023    HCT 40.9 08/21/2023     08/21/2023       Allergies  Allergies   Allergen Reactions    Percocet [Oxycodone-Acetaminophen] Nausea And Vomiting       Current Medications    Current Outpatient Medications:     albuterol sulfate  (90 Base) MCG/ACT inhaler, Inhale 2 puffs Every 4 (Four) Hours As Needed for Wheezing., Disp: , Rfl:     amLODIPine (NORVASC) 2.5 MG tablet, Take 1 tablet by mouth Daily., Disp: 30 tablet, Rfl: 5    aspirin " 81 MG EC tablet, Take 1 tablet by mouth Daily., Disp: 30 tablet, Rfl: 0    atorvastatin (LIPITOR) 80 MG tablet, Take 1 tablet by mouth Daily., Disp: , Rfl:     chlorthalidone (HYGROTON) 25 MG tablet, Take 0.5 tablets by mouth Daily., Disp: 45 tablet, Rfl: 1    cyclobenzaprine (FLEXERIL) 10 MG tablet, As Needed., Disp: , Rfl:     Diphenhydramine-APAP, sleep, (TYLENOL PM EXTRA STRENGTH PO), Take 1 tablet by mouth As Needed., Disp: , Rfl:     lisinopril (PRINIVIL,ZESTRIL) 5 MG tablet, Take 1 tablet by mouth Daily., Disp: , Rfl:     metoprolol tartrate (LOPRESSOR) 25 MG tablet, TAKE 1 TABLET BY MOUTH TWICE DAILY, Disp: 180 tablet, Rfl: 1    montelukast (SINGULAIR) 10 MG tablet, Take 1 tablet by mouth Daily., Disp: , Rfl:     nitroglycerin (NITROSTAT) 0.4 MG SL tablet, Place 1 tablet under the tongue Every 5 (Five) Minutes As Needed for Chest Pain. Take no more than 3 doses in 15 minutes., Disp: 25 tablet, Rfl: 0    Premarin 0.625 MG/GM vaginal cream, As Needed., Disp: , Rfl:     Stiolto Respimat 2.5-2.5 MCG/ACT aerosol solution inhaler, Inhale 2 puffs Daily., Disp: , Rfl:     tamsulosin (FLOMAX) 0.4 MG capsule 24 hr capsule, Take 1 capsule by mouth Daily., Disp: , Rfl:     Trulicity 0.75 MG/0.5ML solution pen-injector, 1 (One) Time Per Week., Disp: , Rfl:     Unable to find, 1 each 1 (One) Time. Med Name: c pap machine at night, Disp: , Rfl:           ROS  Review of Systems   Cardiovascular:  Negative for chest pain, claudication, dyspnea on exertion, irregular heartbeat, palpitations and syncope.   Respiratory:  Negative for cough, shortness of breath and wheezing.          SOCIAL HX  Social History     Socioeconomic History    Marital status:    Tobacco Use    Smoking status: Former     Packs/day: 1     Types: Cigarettes     Quit date:      Years since quittin.8    Smokeless tobacco: Never   Vaping Use    Vaping Use: Never used   Substance and Sexual Activity    Alcohol use: No    Drug use: No     Sexual activity: Defer       FAMILY HX  Family History   Problem Relation Age of Onset    Other Other         BLOOD CLOTS    Cancer Other     Heart disease Other     Diabetes Other     Arthritis Other     Stroke Mother     Diabetes Mother     Heart disease Father     Heart attack Father     No Known Problems Sister     No Known Problems Brother     Cancer Maternal Grandmother     No Known Problems Sister              Sven Escamilla III, MD, FACC

## 2023-12-26 RX ORDER — CHLORTHALIDONE 25 MG/1
12.5 TABLET ORAL DAILY
Qty: 45 TABLET | Refills: 1 | Status: SHIPPED | OUTPATIENT
Start: 2023-12-26 | End: 2023-12-26 | Stop reason: SDUPTHER

## 2023-12-26 RX ORDER — CHLORTHALIDONE 25 MG/1
12.5 TABLET ORAL DAILY
Qty: 45 TABLET | Refills: 1 | Status: SHIPPED | OUTPATIENT
Start: 2023-12-26

## 2023-12-26 NOTE — TELEPHONE ENCOUNTER
Lab Results   Component Value Date    GLUCOSE 139 (H) 08/21/2023    BUN 11 08/21/2023    CREATININE 0.58 08/21/2023    EGFR 103.1 08/21/2023    BCR 19.0 08/21/2023    K 3.7 08/21/2023    CO2 26.0 08/21/2023    CALCIUM 10.4 08/21/2023    ALBUMIN 4.1 08/21/2023    BILITOT 0.8 08/21/2023    AST 18 08/21/2023    ALT 19 08/21/2023

## 2024-01-30 ENCOUNTER — TELEPHONE (OUTPATIENT)
Dept: CARDIOLOGY | Facility: CLINIC | Age: 64
End: 2024-01-30
Payer: COMMERCIAL

## 2024-02-06 ENCOUNTER — OFFICE VISIT (OUTPATIENT)
Dept: CARDIOLOGY | Facility: CLINIC | Age: 64
End: 2024-02-06
Payer: COMMERCIAL

## 2024-02-06 VITALS
HEIGHT: 65 IN | SYSTOLIC BLOOD PRESSURE: 128 MMHG | BODY MASS INDEX: 34.66 KG/M2 | OXYGEN SATURATION: 97 % | HEART RATE: 100 BPM | DIASTOLIC BLOOD PRESSURE: 80 MMHG | WEIGHT: 208 LBS

## 2024-02-06 DIAGNOSIS — E78.00 HYPERCHOLESTEROLEMIA: ICD-10-CM

## 2024-02-06 DIAGNOSIS — I25.10 CORONARY ARTERY DISEASE INVOLVING NATIVE CORONARY ARTERY OF NATIVE HEART WITHOUT ANGINA PECTORIS: Primary | ICD-10-CM

## 2024-02-06 DIAGNOSIS — I10 PRIMARY HYPERTENSION: ICD-10-CM

## 2024-02-06 PROCEDURE — 99214 OFFICE O/P EST MOD 30 MIN: CPT | Performed by: INTERNAL MEDICINE

## 2024-02-06 RX ORDER — GABAPENTIN 300 MG/1
300 CAPSULE ORAL AS NEEDED
COMMUNITY

## 2024-02-06 RX ORDER — ESTRADIOL 10 UG/1
1 INSERT VAGINAL AS NEEDED
COMMUNITY
Start: 2024-01-20

## 2024-02-06 RX ORDER — NITROGLYCERIN 0.4 MG/1
0.4 TABLET SUBLINGUAL
Qty: 25 TABLET | Refills: 3 | Status: SHIPPED | OUTPATIENT
Start: 2024-02-06

## 2024-02-06 NOTE — PROGRESS NOTES
[facility]  Office visit  Bri Boyce  1960  [phone]    VISIT DATE:  2/6/2024      PCP: Romulo Costa, APRN  [address] [place] [address]    CC:  Chief Complaint   Patient presents with    Coronary Artery Disease     1 year follow up         PROBLEM LIST:  Non-obstructive CAD  Left  heart catheterization, October 2011, 40% blockage off the circumflex  and 30% blockage of the LAD.  40% blockage of the RCA.  Echocardiogram, 04/09/2015: EF 55% to 60% with no regional wall motion abnormalities.  Exercise Cardiolite, 04/09/2015: Normal exercise Cardiolite with decreased exercise capacity.  Hypertension   Hypercholesterolemia  Chest pain  Abnormal heart rhythm  Arthritis  Past surgeries:    Cyst removed from ovary.  Cyst removed from right breast.  Tubal ligation    Previous cardiac studies and procedures:  June 2020  2-week ambulatory ECG monitor  A relatively benign monitor study.  Shortness of air correlates with sinus rhythm. Palpitations correlate with PACs and short bursts of nonsustained atrial tachycardia.  Exercise myocardial perfusion imaging  Mild mild calcifications visualized in the mid LAD and proximal circumflex.  Pt denied any chest pain or discomfort, did report significant SOA (O2 sat 93-98%) during stress.  Expected exercise time: 7:05, actual time: 4:00  No ECG evidence of myocardial ischemia  Left ventricular ejection fraction is hyperdynamic (Calculated EF > 70%).  Myocardial perfusion imaging indicates a normal myocardial perfusion study with no evidence of ischemia.  Impressions are consistent with a low risk study.  Echo  Estimated EF appears to be in the range of 56 - 60%.  Left ventricular diastolic dysfunction (grade II) consistent with pseudonormalization.    December 2022  Carotid duplex    Proximal right internal carotid artery plaque without significant stenosis.    Right internal carotid artery stenosis of 0-49%.    Proximal left  internal carotid artery plaque without significant stenosis.    Left internal carotid artery stenosis of 0-49%.    October 2023 exercise myocardial perfusion imaging    Patient denied chest pain/discomfort during exercise. Pt did c/o SOA (SP02 WNL on RA) that resolved in recovery.    Expected exercise duration = 6:45. Actual exercise duration = 4:25. Pt stopped due to SOA.    Equivocal ECG evidence of myocardial ischemia.    Mild three-vessel coronary calcifications.    Left ventricular ejection fraction is normal (Calculated EF = 69%).    Myocardial perfusion imaging indicates a normal myocardial perfusion study with no evidence of ischemia.    Impressions are consistent with a low risk study.    ASSESSMENT:   Diagnosis Plan   1. Coronary artery disease involving native coronary artery of native heart without angina pectoris        2. Hypercholesterolemia        3. Primary hypertension              PLAN:  Coronary artery disease, nonobstructive: Intermittent angina, potential vasospasm.  Continue amlodipine and as needed sublingual nitroglycerin.  Not recommending repeat cardiac catheterization at this time, most recent perfusion imaging reassuring.      Hyperlipidemia: Goal LDL less than 100.  Continue atorvastatin 80 mg by mouth daily. annual lipid panel and CMP.  Encouraged regular exercise and dietary modification.    Hypertension: Goal less than 130/80 mmHg.  Currently well-controlled.  Continue current medical therapy, ACE inhibitor and thiazide diuretic.    Symptomatic PACs and PVCs: continue metoprolol tartrate 25 mg p.o. twice daily.    Diastolic dysfunction, grade 2: Currently euvolemic.  Continue current medical therapy.  Encouraged dietary modifications and regular exercise to achieve weight loss.  Improving functional capacity with regular exercise.      Subjective  Recent episode of precordial chest pain.  Evaluation at [facility].  CT, EKG, laboratory evaluation unremarkable.  Discomfort was  "relieved with sublingual nitroglycerin.  She has had 2 other briefer episodes.  No obvious triggers.    PHYSICAL EXAMINATION:  Vitals:    02/06/24 0940   BP: 128/80   BP Location: Left arm   Patient Position: Sitting   Pulse: 100   SpO2: 97%   Weight: 94.3 kg (208 lb)   Height: 165.1 cm (65\")       General Appearance:    Alert, cooperative, no distress, appears stated age   Head:    Normocephalic, without obvious abnormality, atraumatic   Eyes:    conjunctiva/corneas clear   Nose:   Nares normal, septum midline, mucosa normal, no drainage   Throat:   Lips, teeth and gums normal   Neck:   Supple, symmetrical, trachea midline, no carotid    bruit or JVD   Lungs:     Clear to auscultation bilaterally, respirations unlabored   Chest Wall:    No tenderness or deformity    Heart:    Regular rate and rhythm, S1 and S2 normal, no murmur, rub   or gallop, normal carotid impulse bilaterally without bruit.   Abdomen:     Soft, non-tender   Extremities:   Extremities normal, atraumatic, no cyanosis.  Trivial pretibial edema   Pulses:   2+ and symmetric all extremities   Skin:   Skin color, texture, turgor normal, no rashes or lesions       Diagnostic Data:  Procedures  Lab Results   Component Value Date    TRIG 131 11/23/2021    HDL 34 (L) 11/23/2021     Lab Results   Component Value Date    GLUCOSE 139 (H) 08/21/2023    BUN 11 08/21/2023    CREATININE 0.58 08/21/2023     08/21/2023    K 3.7 08/21/2023    CL 99 08/21/2023    CO2 26.0 08/21/2023     Lab Results   Component Value Date    HGBA1C 5.80 (H) 08/25/2022     Lab Results   Component Value Date    WBC 8.69 08/21/2023    HGB 14.4 08/21/2023    HCT 40.9 08/21/2023     08/21/2023       Allergies  Allergies   Allergen Reactions    Percocet [Oxycodone-Acetaminophen] Nausea And Vomiting       Current Medications    Current Outpatient Medications:     albuterol sulfate  (90 Base) MCG/ACT inhaler, Inhale 2 puffs Every 4 (Four) Hours As Needed for Wheezing., " Disp: , Rfl:     Albuterol Sulfate, sensor, 108 (90 Base) MCG/ACT aerosol powder , As Needed., Disp: , Rfl:     amLODIPine (NORVASC) 2.5 MG tablet, Take 1 tablet by mouth Daily., Disp: 30 tablet, Rfl: 5    aspirin 81 MG EC tablet, Take 1 tablet by mouth Daily., Disp: 30 tablet, Rfl: 0    atorvastatin (LIPITOR) 80 MG tablet, Take 1 tablet by mouth Daily., Disp: , Rfl:     chlorthalidone (HYGROTON) 25 MG tablet, Take 0.5 tablets by mouth Daily., Disp: 45 tablet, Rfl: 1    Diphenhydramine-APAP, sleep, (TYLENOL PM EXTRA STRENGTH PO), Take 1 tablet by mouth As Needed., Disp: , Rfl:     estradiol (VAGIFEM) 10 MCG tablet vaginal tablet, 1 tablet As Needed., Disp: , Rfl:     gabapentin (NEURONTIN) 300 MG capsule, Take 1 capsule by mouth As Needed., Disp: , Rfl:     lisinopril (PRINIVIL,ZESTRIL) 5 MG tablet, Take 1 tablet by mouth Daily., Disp: , Rfl:     metoprolol tartrate (LOPRESSOR) 25 MG tablet, TAKE 1 TABLET BY MOUTH TWICE DAILY, Disp: 180 tablet, Rfl: 1    montelukast (SINGULAIR) 10 MG tablet, Take 1 tablet by mouth As Needed., Disp: , Rfl:     nitroglycerin (NITROSTAT) 0.4 MG SL tablet, Place 1 tablet under the tongue Every 5 (Five) Minutes As Needed for Chest Pain. Take no more than 3 doses in 15 minutes., Disp: 25 tablet, Rfl: 3    Premarin 0.625 MG/GM vaginal cream, As Needed., Disp: , Rfl:     Stiolto Respimat 2.5-2.5 MCG/ACT aerosol solution inhaler, Inhale 2 puffs Daily., Disp: , Rfl:     tamsulosin (FLOMAX) 0.4 MG capsule 24 hr capsule, Take 1 capsule by mouth Daily., Disp: , Rfl:     Trulicity 0.75 MG/0.5ML solution pen-injector, 1 (One) Time Per Week., Disp: , Rfl:     Unable to find, 1 each 1 (One) Time. Med Name: c pap machine at night, Disp: , Rfl:           ROS  Review of Systems   Cardiovascular:  Negative for chest pain, claudication, dyspnea on exertion, irregular heartbeat, palpitations and syncope.   Respiratory:  Negative for cough, shortness of breath and wheezing.          SOCIAL HX  Social  History     Socioeconomic History    Marital status:    Tobacco Use    Smoking status: Former     Packs/day: 1     Types: Cigarettes     Quit date: 2011     Years since quittin.9    Smokeless tobacco: Never   Vaping Use    Vaping Use: Never used   Substance and Sexual Activity    Alcohol use: No    Drug use: No    Sexual activity: Defer       FAMILY HX  Family History   Problem Relation Age of Onset    Other Other         BLOOD CLOTS    Cancer Other     Heart disease Other     Diabetes Other     Arthritis Other     Stroke Mother     Diabetes Mother     Heart disease Father     Heart attack Father     No Known Problems Sister     No Known Problems Brother     Cancer Maternal Grandmother     No Known Problems Sister              Sven Escamilla III, MD, FACC

## 2024-02-29 ENCOUNTER — OFFICE VISIT (OUTPATIENT)
Dept: CARDIOLOGY | Facility: CLINIC | Age: 64
End: 2024-02-29
Payer: COMMERCIAL

## 2024-02-29 VITALS
SYSTOLIC BLOOD PRESSURE: 126 MMHG | WEIGHT: 212 LBS | HEIGHT: 65 IN | OXYGEN SATURATION: 98 % | BODY MASS INDEX: 35.32 KG/M2 | DIASTOLIC BLOOD PRESSURE: 72 MMHG | HEART RATE: 90 BPM

## 2024-02-29 DIAGNOSIS — I10 PRIMARY HYPERTENSION: ICD-10-CM

## 2024-02-29 DIAGNOSIS — G47.33 OBSTRUCTIVE SLEEP APNEA: Primary | ICD-10-CM

## 2024-02-29 NOTE — ASSESSMENT & PLAN NOTE
Patient BP is well controlled with medication and pap therapy.  Untreated sleep apnea may potentiate sleep apnea.

## 2024-02-29 NOTE — PROGRESS NOTES
Follow-Up Sleep Consult     Date:   2024  Name: Bri Boyce  :   1960  PCP: Romulo Costa APRN    Chief Complaint   Patient presents with    Sleep Apnea       Subjective     History of Present Illness  Bri Boyce is a 63 y.o. female who presents today for follow-up on HARMONY.    Patient is here to follow up with her pap machine.  She states she can not sleep or nap without her pap machine.  She feels like she can breathe better.  She feels her sleep is more rested.  She says she does take scheduled naps daily.  She does have chronic pain.  She has co-existing CAD, DM, and emphysema.  Download shows good compliance and control.  Airflow and mask fit are comfortable.    HARMONY History:    HST 23 baseline AHI of 8.4, supine AHI 12.8     Current settings 6-20 cm  Current mask used is FFM    Device Functioning Well: Yes  Mask Fit Comfortable: Yes  Air Flow Comfortable: Yes  DME Helpful for Supplies: Yes  Sleep is rested: Yes        Device Download:                     The patient's relevant past medical, surgical, family, and social history reviewed and updated in Epic as appropriate.    Past Medical History:   Diagnosis Date    Abnormal heart rhythm 10/14/2016    Abnormal heart rhythm 10/14/2016    Arthritis 10/14/2016    CAD (coronary artery disease) 10/14/2016    Non-obstructive CAD    Chest pain 10/14/2016    COPD (chronic obstructive pulmonary disease)     Diabetes mellitus     Heart disease     History of stress test     Hypercholesterolemia 10/14/2016    Hypertension 10/14/2016    Pneumonia 2019    Sleep apnea      Past Surgical History:   Procedure Laterality Date    ABLATION OF DYSRHYTHMIC FOCUS      OTHER SURGICAL HISTORY      CYST REMOVAL BREAST    OVARIAN CYST SURGERY      TUBAL ABDOMINAL LIGATION       OB History    No obstetric history on file.       Allergies   Allergen Reactions    Percocet [Oxycodone-Acetaminophen] Nausea And Vomiting     Prior to  Admission medications    Medication Sig Start Date End Date Taking? Authorizing Provider   albuterol sulfate  (90 Base) MCG/ACT inhaler Inhale 2 puffs Every 4 (Four) Hours As Needed for Wheezing.    Kenton Bryan MD   Albuterol Sulfate, sensor, 108 (90 Base) MCG/ACT aerosol powder  As Needed.    Kenton Bryan MD   amLODIPine (NORVASC) 2.5 MG tablet Take 1 tablet by mouth Daily. 9/14/23   Sven Escamilla III, MD   aspirin 81 MG EC tablet Take 1 tablet by mouth Daily. 8/22/23   Norma Olivares MD   atorvastatin (LIPITOR) 80 MG tablet Take 1 tablet by mouth Daily. 7/20/15   Kenton Bryan MD   chlorthalidone (HYGROTON) 25 MG tablet Take 0.5 tablets by mouth Daily. 12/26/23   Sven Escamilla III, MD   Diphenhydramine-APAP, sleep, (TYLENOL PM EXTRA STRENGTH PO) Take 1 tablet by mouth As Needed. 12/15/15   Kenton Bryan MD   estradiol (VAGIFEM) 10 MCG tablet vaginal tablet 1 tablet As Needed. 1/20/24   Kenton Bryan MD   gabapentin (NEURONTIN) 300 MG capsule Take 1 capsule by mouth As Needed.    Kenton Bryan MD   lisinopril (PRINIVIL,ZESTRIL) 5 MG tablet Take 1 tablet by mouth Daily. 5/24/22   Kenton Bryan MD   metoprolol tartrate (LOPRESSOR) 25 MG tablet TAKE 1 TABLET BY MOUTH TWICE DAILY 10/17/22   Sven Escamilla III, MD   montelukast (SINGULAIR) 10 MG tablet Take 1 tablet by mouth As Needed. 10/22/21   Kenton Bryan MD   nitroglycerin (NITROSTAT) 0.4 MG SL tablet Place 1 tablet under the tongue Every 5 (Five) Minutes As Needed for Chest Pain. Take no more than 3 doses in 15 minutes. 2/6/24   Sven Escamilla III, MD   Premarin 0.625 MG/GM vaginal cream As Needed.    Kenton Bryan MD   Stiolto Respimat 2.5-2.5 MCG/ACT aerosol solution inhaler Inhale 2 puffs Daily.    Kneton Bryan MD   tamsulosin (FLOMAX) 0.4 MG capsule 24 hr capsule Take 1 capsule by mouth Daily.    Kenton Bryan MD   Trulicity 0.75 MG/0.5ML solution pen-injector  "1 (One) Time Per Week.    Provider, MD [place]on   Unable to find 1 each 1 (One) Time. Med Name: c pap machine at night    Provider, MD [place]     Family History   Problem Relation Age of Onset    Other Other         BLOOD CLOTS    Cancer Other     Heart disease Other     Diabetes Other     Arthritis Other     Stroke Mother     Diabetes Mother     Heart disease Father     Heart attack Father     No Known Problems Sister     No Known Problems Brother     Cancer Maternal Grandmother     No Known Problems Sister        Objective     Vital Signs:  /72 (BP Location: Right arm, Patient Position: Sitting)   Pulse 90   Ht 165.1 cm (65\")   Wt 96.2 kg (212 lb)   SpO2 98%   BMI 35.28 kg/m²              Physical Exam  Constitutional:       Appearance: Normal appearance.   Pulmonary:      Effort: Pulmonary effort is normal.      Breath sounds: Normal breath sounds.   Skin:     General: Skin is warm and dry.      Capillary Refill: Capillary refill takes less than 2 seconds.   Neurological:      General: No focal deficit present.      Mental Status: She is alert and oriented to person, place, and time.   Psychiatric:         Mood and Affect: Mood normal.         Behavior: Behavior normal.         The following data was reviewed by: BALDO Castillo on 02/29/2024:    I have reviewed and interpreted the data from a thirty day download 1/27/24 to 2/25/24.  Download shows good compliance and control AHI 0.3.  Airflow and mask fit are comfortable.  She reports improved sleep and feeling more rested during the day.  Plan to continue current therapy.       Assessment and Plan     Diagnoses and all orders for this visit:    1. Obstructive sleep apnea (Primary)  Assessment & Plan:  Patient has a baseline AHI 8.4.  This is mild sleep apnea.  Download shows good compliance and control.  Airflow and mask fit are comfortable.  She is receiving benefit from pap therapy.  Plan to continue current therapy.  Supply order sent " to DME of patient choice.    Orders:  -     PAP Therapy    2. Primary hypertension  Assessment & Plan:  Patient BP is well controlled with medication and pap therapy.  Untreated sleep apnea may potentiate sleep apnea.           Report if any new/changing symptoms immediately, Sleep risks reviewed (driving, medical, sleep death, sedating agents), and Sleep hygiene discussed         Follow Up  Return in about 1 year (around 2/28/2025) for Next scheduled follow up.  Patient was given instructions and counseling regarding her condition or for health maintenance advice. Please see specific information pulled into the AVS if appropriate.

## 2024-02-29 NOTE — ASSESSMENT & PLAN NOTE
Patient has a baseline AHI 8.4.  This is mild sleep apnea.  Download shows good compliance and control.  Airflow and mask fit are comfortable.  She is receiving benefit from pap therapy.  Plan to continue current therapy.  Supply order sent to DME of patient choice.

## 2024-03-04 RX ORDER — AMLODIPINE BESYLATE 2.5 MG/1
2.5 TABLET ORAL DAILY
Qty: 30 TABLET | Refills: 5 | OUTPATIENT
Start: 2024-03-04

## 2024-03-04 RX ORDER — AMLODIPINE BESYLATE 2.5 MG/1
2.5 TABLET ORAL DAILY
Qty: 30 TABLET | Refills: 5 | Status: SHIPPED | OUTPATIENT
Start: 2024-03-04

## 2024-04-16 ENCOUNTER — TELEPHONE (OUTPATIENT)
Dept: CARDIOLOGY | Facility: CLINIC | Age: 64
End: 2024-04-16
Payer: COMMERCIAL

## 2024-04-16 NOTE — TELEPHONE ENCOUNTER
[facility] for appt on 09/10/24 with Escamilla at 9:45 am -[address], [address], [place] [address]. Return call at [phone] with any questions. GORDO 4/16/24

## 2024-09-10 ENCOUNTER — OFFICE VISIT (OUTPATIENT)
Dept: CARDIOLOGY | Facility: CLINIC | Age: 64
End: 2024-09-10
Payer: COMMERCIAL

## 2024-09-10 VITALS
OXYGEN SATURATION: 97 % | HEIGHT: 65 IN | SYSTOLIC BLOOD PRESSURE: 124 MMHG | DIASTOLIC BLOOD PRESSURE: 86 MMHG | BODY MASS INDEX: 32.29 KG/M2 | HEART RATE: 90 BPM | WEIGHT: 193.8 LBS

## 2024-09-10 DIAGNOSIS — I10 PRIMARY HYPERTENSION: ICD-10-CM

## 2024-09-10 DIAGNOSIS — I25.10 CORONARY ARTERY DISEASE INVOLVING NATIVE CORONARY ARTERY OF NATIVE HEART WITHOUT ANGINA PECTORIS: Primary | ICD-10-CM

## 2024-09-10 DIAGNOSIS — E78.00 HYPERCHOLESTEROLEMIA: ICD-10-CM

## 2024-09-10 PROCEDURE — 99214 OFFICE O/P EST MOD 30 MIN: CPT | Performed by: INTERNAL MEDICINE

## 2024-09-10 RX ORDER — FAMOTIDINE 40 MG/1
40 TABLET, FILM COATED ORAL EVERY EVENING
COMMUNITY
Start: 2024-07-15

## 2024-09-10 RX ORDER — TIRZEPATIDE 5 MG/.5ML
5 INJECTION, SOLUTION SUBCUTANEOUS WEEKLY
COMMUNITY

## 2024-09-10 NOTE — PROGRESS NOTES
[facility]  Office visit  Bri Boyce  1960  [phone]    VISIT DATE:  9/10/2024      PCP: Romulo Costa, APRN  [address] [place] [address]    CC:  Chief Complaint   Patient presents with    Coronary Artery Disease              PROBLEM LIST:  Non-obstructive CAD  Left  heart catheterization, October 2011, 40% blockage off the circumflex  and 30% blockage of the LAD.  40% blockage of the RCA.  Echocardiogram, 04/09/2015: EF 55% to 60% with no regional wall motion abnormalities.  Exercise Cardiolite, 04/09/2015: Normal exercise Cardiolite with decreased exercise capacity.  Hypertension   Hypercholesterolemia  Chest pain  Abnormal heart rhythm  Arthritis  Past surgeries:    Cyst removed from ovary.  Cyst removed from right breast.  Tubal ligation    Previous cardiac studies and procedures:  June 2020  2-week ambulatory ECG monitor  A relatively benign monitor study.  Shortness of air correlates with sinus rhythm. Palpitations correlate with PACs and short bursts of nonsustained atrial tachycardia.  Exercise myocardial perfusion imaging  Mild mild calcifications visualized in the mid LAD and proximal circumflex.  Pt denied any chest pain or discomfort, did report significant SOA (O2 sat 93-98%) during stress.  Expected exercise time: 7:05, actual time: 4:00  No ECG evidence of myocardial ischemia  Left ventricular ejection fraction is hyperdynamic (Calculated EF > 70%).  Myocardial perfusion imaging indicates a normal myocardial perfusion study with no evidence of ischemia.  Impressions are consistent with a low risk study.  Echo  Estimated EF appears to be in the range of 56 - 60%.  Left ventricular diastolic dysfunction (grade II) consistent with pseudonormalization.    December 2022  Carotid duplex    Proximal right internal carotid artery plaque without significant stenosis.    Right internal carotid artery stenosis of 0-49%.    Proximal left internal carotid  artery plaque without significant stenosis.    Left internal carotid artery stenosis of 0-49%.    October 2023 exercise myocardial perfusion imaging    Patient denied chest pain/discomfort during exercise. Pt did c/o SOA (SP02 WNL on RA) that resolved in recovery.    Expected exercise duration = 6:45. Actual exercise duration = 4:25. Pt stopped due to SOA.    Equivocal ECG evidence of myocardial ischemia.    Mild three-vessel coronary calcifications.    Left ventricular ejection fraction is normal (Calculated EF = 69%).    Myocardial perfusion imaging indicates a normal myocardial perfusion study with no evidence of ischemia.    Impressions are consistent with a low risk study.    ASSESSMENT:   Diagnosis Plan   1. Coronary artery disease involving native coronary artery of native heart without angina pectoris        2. Hypercholesterolemia        3. Primary hypertension                PLAN:  Coronary artery disease, nonobstructive: Intermittent angina, potential vasospasm.  Continue amlodipine and as needed sublingual nitroglycerin.  Not recommending repeat cardiac catheterization at this time, most recent perfusion imaging reassuring.  Decreasing low-dose aspirin to every other day dosing due to easy bruising.    Hyperlipidemia: Goal LDL less than 100.  Continue atorvastatin 80 mg by mouth daily. annual lipid panel and CMP.  Encouraged regular exercise and dietary modification.    Hypertension: Goal less than 130/80 mmHg.  Currently well-controlled.  Continue current medical therapy, ACE inhibitor and thiazide diuretic.    Symptomatic PACs and PVCs: continue metoprolol tartrate 25 mg p.o. twice daily.    Diastolic dysfunction, grade 2: Currently euvolemic.  Continue current medical therapy.  Encouraged dietary modifications and regular exercise to achieve weight loss.  Improving functional capacity with regular exercise.      Subjective  Has lost 15 pounds after initiation of Mounjaro.  She is walking on a regular  "base without difficulty.  No chest discomfort.  Persistent easy bruising with unremarkable evaluation by hematology.      PHYSICAL EXAMINATION:  Vitals:    09/10/24 0919   BP: 124/86   BP Location: Right arm   Patient Position: Sitting   Pulse: 90   SpO2: 97%   Weight: 87.9 kg (193 lb 12.8 oz)   Height: 165.1 cm (65\")       General Appearance:    Alert, cooperative, no distress, appears stated age   Head:    Normocephalic, without obvious abnormality, atraumatic   Eyes:    conjunctiva/corneas clear   Nose:   Nares normal, septum midline, mucosa normal, no drainage   Throat:   Lips, teeth and gums normal   Neck:   Supple, symmetrical, trachea midline, no carotid    bruit or JVD   Lungs:     Clear to auscultation bilaterally, respirations unlabored   Chest Wall:    No tenderness or deformity    Heart:    Regular rate and rhythm, S1 and S2 normal, no murmur, rub   or gallop, normal carotid impulse bilaterally without bruit.   Abdomen:     Soft, non-tender   Extremities:   Extremities normal, atraumatic, no cyanosis.  Trivial pretibial edema   Pulses:   2+ and symmetric all extremities   Skin:   Skin color, texture, turgor normal, no rashes or lesions       Diagnostic Data:  Procedures  Lab Results   Component Value Date    TRIG 131 11/23/2021    HDL 34 (L) 11/23/2021     Lab Results   Component Value Date    GLUCOSE 139 (H) 08/21/2023    BUN 11 08/21/2023    CREATININE 0.58 08/21/2023     08/21/2023    K 3.7 08/21/2023    CL 99 08/21/2023    CO2 26.0 08/21/2023     Lab Results   Component Value Date    HGBA1C 5.80 (H) 08/25/2022     Lab Results   Component Value Date    WBC 14.42 (H) 03/14/2024    HGB 15.8 (H) 03/14/2024    HCT 46.6 (H) 03/14/2024     03/14/2024       Allergies  Allergies   Allergen Reactions    Percocet [Oxycodone-Acetaminophen] Nausea And Vomiting       Current Medications    Current Outpatient Medications:     albuterol sulfate  (90 Base) MCG/ACT inhaler, Inhale 2 puffs Every 4 " (Four) Hours As Needed for Wheezing., Disp: , Rfl:     amLODIPine (NORVASC) 2.5 MG tablet, TAKE 1 TABLET BY MOUTH DAILY, Disp: 30 tablet, Rfl: 5    aspirin 81 MG EC tablet, Take 1 tablet by mouth Daily., Disp: 30 tablet, Rfl: 0    atorvastatin (LIPITOR) 80 MG tablet, Take 1 tablet by mouth Daily., Disp: , Rfl:     chlorthalidone (HYGROTON) 25 MG tablet, Take 0.5 tablets by mouth Daily., Disp: 45 tablet, Rfl: 1    Diphenhydramine-APAP, sleep, (TYLENOL PM EXTRA STRENGTH PO), Take 1 tablet by mouth As Needed., Disp: , Rfl:     estradiol (VAGIFEM) 10 MCG tablet vaginal tablet, 1 tablet As Needed., Disp: , Rfl:     famotidine (PEPCID) 40 MG tablet, Take 1 tablet by mouth Every Evening., Disp: , Rfl:     gabapentin (NEURONTIN) 300 MG capsule, Take 1 capsule by mouth As Needed., Disp: , Rfl:     lisinopril (PRINIVIL,ZESTRIL) 5 MG tablet, Take 1 tablet by mouth Daily., Disp: , Rfl:     metoprolol tartrate (LOPRESSOR) 25 MG tablet, TAKE 1 TABLET BY MOUTH TWICE DAILY, Disp: 180 tablet, Rfl: 1    montelukast (SINGULAIR) 10 MG tablet, Take 1 tablet by mouth As Needed., Disp: , Rfl:     Mounjaro 5 MG/0.5ML solution pen-injector pen, Inject 0.5 mL under the skin into the appropriate area as directed 1 (One) Time Per Week., Disp: , Rfl:     nitroglycerin (NITROSTAT) 0.4 MG SL tablet, Place 1 tablet under the tongue Every 5 (Five) Minutes As Needed for Chest Pain. Take no more than 3 doses in 15 minutes., Disp: 25 tablet, Rfl: 3    Premarin 0.625 MG/GM vaginal cream, As Needed., Disp: , Rfl:     Stiolto Respimat 2.5-2.5 MCG/ACT aerosol solution inhaler, Inhale 2 puffs Daily., Disp: , Rfl:     tamsulosin (FLOMAX) 0.4 MG capsule 24 hr capsule, Take 1 capsule by mouth Daily., Disp: , Rfl:     Unable to find, 1 each 1 (One) Time. Med Name: c pap machine at night, Disp: , Rfl:           ROS  Review of Systems   Cardiovascular:  Negative for chest pain, claudication, dyspnea on exertion, irregular heartbeat, palpitations and syncope.    Respiratory:  Negative for cough, shortness of breath and wheezing.          SOCIAL HX  Social History     Socioeconomic History    Marital status:    Tobacco Use    Smoking status: Former     Current packs/day: 0.00     Average packs/day: 1 pack/day for 15.0 years (15.0 ttl pk-yrs)     Types: Cigarettes     Quit date: 2011     Years since quittin.5     Passive exposure: Past    Smokeless tobacco: Never   Vaping Use    Vaping status: Never Used   Substance and Sexual Activity    Alcohol use: No    Drug use: No    Sexual activity: Not Currently     Partners: Male     Birth control/protection: None       FAMILY HX  Family History   Problem Relation Age of Onset    Other Other         BLOOD CLOTS    Cancer Other     Heart disease Other     Diabetes Other     Arthritis Other     Stroke Mother     Diabetes Mother     Heart disease Father     Heart attack Father     No Known Problems Sister     No Known Problems Brother     Cancer Maternal Grandmother     No Known Problems Sister              Sven Escamilla III, MD, FACC

## 2024-11-26 RX ORDER — CHLORTHALIDONE 25 MG/1
12.5 TABLET ORAL DAILY
Qty: 45 TABLET | Refills: 1 | Status: SHIPPED | OUTPATIENT
Start: 2024-11-26

## 2025-02-28 ENCOUNTER — PATIENT ROUNDING (BHMG ONLY) (OUTPATIENT)
Dept: CARDIOLOGY | Facility: CLINIC | Age: 65
End: 2025-02-28

## 2025-02-28 ENCOUNTER — OFFICE VISIT (OUTPATIENT)
Dept: CARDIOLOGY | Facility: CLINIC | Age: 65
End: 2025-02-28
Payer: COMMERCIAL

## 2025-02-28 VITALS
SYSTOLIC BLOOD PRESSURE: 122 MMHG | BODY MASS INDEX: 31.36 KG/M2 | OXYGEN SATURATION: 99 % | HEART RATE: 102 BPM | WEIGHT: 188.2 LBS | HEIGHT: 65 IN | DIASTOLIC BLOOD PRESSURE: 82 MMHG

## 2025-02-28 DIAGNOSIS — G47.33 OBSTRUCTIVE SLEEP APNEA: Primary | ICD-10-CM

## 2025-02-28 PROCEDURE — 99213 OFFICE O/P EST LOW 20 MIN: CPT | Performed by: NURSE PRACTITIONER

## 2025-02-28 RX ORDER — TIRZEPATIDE 7.5 MG/.5ML
7.5 INJECTION, SOLUTION SUBCUTANEOUS
COMMUNITY
Start: 2025-01-24

## 2025-02-28 RX ORDER — FOLIC ACID 0.4 MG
1 TABLET ORAL DAILY
COMMUNITY
Start: 2024-12-17

## 2025-02-28 NOTE — ASSESSMENT & PLAN NOTE
Patient has baseline AHI 8.  This is mild sleep apnea.  Download shows suboptimal compliance and control.  Patient has been off of her CPAP for several weeks due to having pneumonia and upper respiratory infection.  She said the past couple of weeks she has been back on her PAP device faithfully.  Mask fit and airflow are comfortable.  Her sleep is more rested since she started back on her PAP device.  Patient is receiving benefit from PAP therapy.  Plan to continue current treatment    Prescription for PAP supplies sent to DME of patient choice.

## 2025-02-28 NOTE — PROGRESS NOTES
..My name is Angy Victoria and I am the Practice Manager for [facility].    I would like to thank you for being a loyal patient. If you do not mind I would like to ask you a few questions about your recent visit with us.  Please feel free to reply if you wish to provide us with feedback on your first visit with our practice.    First, could you tell me what went well with your recent visit?    Secondly, we are always looking for ways to make our patients' experiences even better.  Do you have any recommendations on ways we may improve?    Finally, overall were you satisfied with your first visit to us as a [facility] facility?    In the next few days, you will be receiving a Patient Experience Survey.      Thank you for taking the time to answer a few questions today.  I hope you have a good day.

## 2025-02-28 NOTE — PROGRESS NOTES
Follow-Up Sleep Consult     Date:   2025  Name: Bri Boyce  :   1960  PCP: Romulo Costa APRN    Chief Complaint   Patient presents with    Follow-up     1 year HARMONY follow up       Subjective     History of Present Illness  Bri Boyce is a 64 y.o. female who presents today for follow-up on HARMONY.    Patient states she had pneumonia and has not worn her pap for several weeks.  Patient states she could not breath she was so congested.  She states she had just started using her pap the past couple of weeks since her sinuses are not as congested.  Patient reports that airflow and mask fit are comfortable.    HARMONY History:    HST 23 baseline AHI of 8.4, supine AHI 12.8     Current settings 6-20 cm    Current mask used is FFM    Device Functioning Well: Yes  Mask Fit Comfortable: Yes  Air Flow Comfortable: Yes  DME Helpful for Supplies: Yes  Sleep is rested: Yes        Device Download:                     The patient's relevant past medical, surgical, family, and social history reviewed and updated in Epic as appropriate.    Past Medical History:   Diagnosis Date    Abnormal heart rhythm 10/14/2016    Abnormal heart rhythm 10/14/2016    Arthritis 10/14/2016    CAD (coronary artery disease) 10/14/2016    Non-obstructive CAD    Chest pain 10/14/2016    COPD (chronic obstructive pulmonary disease)     Diabetes mellitus     Heart disease     History of stress test     Hypercholesterolemia 10/14/2016    Hypertension 10/14/2016    Pneumonia 2019    Sleep apnea      Past Surgical History:   Procedure Laterality Date    ABLATION OF DYSRHYTHMIC FOCUS      OTHER SURGICAL HISTORY      CYST REMOVAL BREAST    OVARIAN CYST SURGERY      TUBAL ABDOMINAL LIGATION       OB History    No obstetric history on file.       Allergies   Allergen Reactions    Percocet [Oxycodone-Acetaminophen] Nausea And Vomiting     Prior to Admission medications    Medication Sig Start Date End Date Taking?  Authorizing Provider   albuterol sulfate  (90 Base) MCG/ACT inhaler Inhale 2 puffs Every 4 (Four) Hours As Needed for Wheezing.   Yes Kenton Bryan MD   amLODIPine (NORVASC) 2.5 MG tablet TAKE 1 TABLET BY MOUTH DAILY 3/4/24  Yes Sven Escamilla III, MD   aspirin 81 MG EC tablet Take 1 tablet by mouth Daily. 8/22/23  Yes Norma Olivares MD   atorvastatin (LIPITOR) 80 MG tablet Take 1 tablet by mouth Daily. 7/20/15  Yes Kenton Bryan MD   chlorthalidone (HYGROTON) 25 MG tablet Take 1/2 (one-half) tablet by mouth once daily 11/26/24  Yes Sven Escamilal III, MD   Diphenhydramine-APAP, sleep, (TYLENOL PM EXTRA STRENGTH PO) Take 1 tablet by mouth As Needed. 12/15/15  Yes Kenton Bryan MD   folic acid (FOLVITE) 400 MCG tablet Take 1 tablet by mouth Daily. 12/17/24  Yes Kenton Bryan MD   gabapentin (NEURONTIN) 300 MG capsule Take 1 capsule by mouth As Needed.   Yes Kenton Bryan MD   lisinopril (PRINIVIL,ZESTRIL) 5 MG tablet Take 1 tablet by mouth Daily. 5/24/22  Yes Kenton Bryan MD   metoprolol tartrate (LOPRESSOR) 25 MG tablet TAKE 1 TABLET BY MOUTH TWICE DAILY 10/17/22  Yes Sven Escamilla III, MD   montelukast (SINGULAIR) 10 MG tablet Take 1 tablet by mouth As Needed. 10/22/21  Yes Provider, Historical, MD   Mounjaro 7.5 MG/0.5ML solution auto-injector Inject 7.5 mg as directed Every 7 (Seven) Days. 1/24/25  Yes Kenton Bryan MD   nitroglycerin (NITROSTAT) 0.4 MG SL tablet Place 1 tablet under the tongue Every 5 (Five) Minutes As Needed for Chest Pain. Take no more than 3 doses in 15 minutes. 2/6/24  Yes Sven Escamilla III, MD   Premarin 0.625 MG/GM vaginal cream As Needed.   Yes Kenton Bryan MD   Unable to find 1 each 1 (One) Time. Med Name: c pap machine at night   Yes Kenton Bryan MD   estradiol (VAGIFEM) 10 MCG tablet vaginal tablet 1 tablet As Needed.  Patient not taking: Reported on 2/28/2025 1/20/24 2/28/25  Provider, MD Kenton  "  famotidine (PEPCID) 40 MG tablet Take 1 tablet by mouth Every Evening. 7/15/24 2/28/25  Provider, Historical, MD   Mounjaro 5 MG/0.5ML solution pen-injector pen Inject 0.5 mL under the skin into the appropriate area as directed 1 (One) Time Per Week.  Patient not taking: Reported on 2/28/2025 2/28/25  Kenton Bryan MD   Stiolto Respimat 2.5-2.5 MCG/ACT aerosol solution inhaler Inhale 2 puffs Daily.  2/28/25  Kenton Bryan MD   tamsulosin (FLOMAX) 0.4 MG capsule 24 hr capsule Take 1 capsule by mouth Daily.  2/28/25  Provider, MD Kenton     Family History   Problem Relation Age of Onset    Other Other         BLOOD CLOTS    Cancer Other     Heart disease Other     Diabetes Other     Arthritis Other     Stroke Mother     Diabetes Mother     Heart disease Father     Heart attack Father     No Known Problems Sister     No Known Problems Brother     Cancer Maternal Grandmother     No Known Problems Sister        Objective     Vital Signs:  /82 (BP Location: Right arm, Patient Position: Sitting, Cuff Size: Adult)   Pulse 102   Ht 165.1 cm (65\")   Wt 85.4 kg (188 lb 3.2 oz)   SpO2 99%   BMI 31.32 kg/m²     BMI is >= 30 and <35. (Class 1 Obesity). The following options were offered after discussion;: exercise counseling/recommendations and nutrition counseling/recommendations        Physical Exam  Constitutional:       Appearance: Normal appearance. She is obese.   Neurological:      Mental Status: She is alert.   Psychiatric:         Mood and Affect: Mood normal.         Behavior: Behavior normal.         Thought Content: Thought content normal.         Judgment: Judgment normal.         The following data was reviewed by: BALDO Castillo on 02/28/2025:    30-day Download 1/24/25 to 2/22/25 I have reviewed and interpreted the data from the download.           Assessment and Plan     Diagnoses and all orders for this visit:    1. Obstructive sleep apnea (Primary)  Assessment & " Plan:  Patient has baseline AHI 8.  This is mild sleep apnea.  Download shows suboptimal compliance and control.  Patient has been off of her CPAP for several weeks due to having pneumonia and upper respiratory infection.  She said the past couple of weeks she has been back on her PAP device faithfully.  Mask fit and airflow are comfortable.  Her sleep is more rested since she started back on her PAP device.  Patient is receiving benefit from PAP therapy.  Plan to continue current treatment    Prescription for PAP supplies sent to DME of patient choice.    Orders:  -     PAP Therapy        Report if any new/changing symptoms immediately, Sleep risks reviewed (driving, medical, sleep death, sedating agents), Sleep hygiene discussed, and Increase pap therapy usage         Follow Up  Return in about 1 year (around 2/28/2026) for Yearly HARMONY.  Patient was given instructions and counseling regarding her condition or for health maintenance advice. Please see specific information pulled into the AVS if appropriate.

## 2025-03-06 ENCOUNTER — HOSPITAL ENCOUNTER (OUTPATIENT)
Dept: HOSPITAL 22 - RT | Age: 65
Discharge: HOME | End: 2025-03-06
Payer: COMMERCIAL

## 2025-03-06 DIAGNOSIS — J44.9: Primary | ICD-10-CM

## 2025-03-06 PROCEDURE — 94726 PLETHYSMOGRAPHY LUNG VOLUMES: CPT

## 2025-03-06 PROCEDURE — 94729 DIFFUSING CAPACITY: CPT

## 2025-03-06 PROCEDURE — 94618 PULMONARY STRESS TESTING: CPT

## 2025-03-06 PROCEDURE — 94060 EVALUATION OF WHEEZING: CPT

## 2025-03-06 RX ADMIN — ALBUTEROL SULFATE 2.5 MG: 2.5 SOLUTION RESPIRATORY (INHALATION) at 09:08

## 2025-03-06 NOTE — PC.NURSE
PFT and 6 Minute Walk Test completed without incident.  Albuterol 0.083% given via HHN, per written protocol, Pt tolerated tx well.

## 2025-03-17 ENCOUNTER — TELEPHONE (OUTPATIENT)
Dept: CARDIOLOGY | Facility: CLINIC | Age: 65
End: 2025-03-17
Payer: COMMERCIAL

## 2025-03-17 ENCOUNTER — HOSPITAL ENCOUNTER (OUTPATIENT)
Dept: HOSPITAL 22 - LAB | Age: 65
Discharge: HOME | End: 2025-03-17
Payer: COMMERCIAL

## 2025-03-17 DIAGNOSIS — R42: Primary | ICD-10-CM

## 2025-03-17 DIAGNOSIS — R51.9: ICD-10-CM

## 2025-03-17 DIAGNOSIS — E66.9: ICD-10-CM

## 2025-03-17 LAB
ALBUMIN LEVEL: 4.4 G/DL (ref 3.5–5)
ALBUMIN/GLOB SERPL: 1.8 {RATIO} (ref 1.1–1.8)
ALP ISO SERPL-ACNC: 110 U/L (ref 38–126)
ALT SERPLBLD-CCNC: 35 U/L (ref 12–78)
ANION GAP SERPL CALC-SCNC: 11.3 MEQ/L (ref 5–15)
AST SERPL QL: 50 U/L (ref 14–36)
BILIRUBIN,TOTAL: 1.1 MG/DL (ref 0.2–1.3)
BUN SERPL-MCNC: 12 MG/DL (ref 7–17)
CALCIUM SPEC-MCNC: 9.6 MG/DL (ref 8.4–10.2)
CELLS COUNTED: 100
CHLORIDE SPEC-SCNC: 99 MMOL/L (ref 98–107)
CO2 SERPL-SCNC: 32 MMOL/L (ref 22–30)
CREAT BLD-SCNC: 0.5 MG/DL (ref 0.52–1.04)
ESTIMATED GLOMERULAR FILT RATE: 125 ML/MIN (ref 60–?)
FOLATE: > 20 NG/ML
GFR (AFRICAN AMERICAN): 151 ML/MIN (ref 60–?)
GLOBULIN SER CALC-MCNC: 2.4 G/DL (ref 1.3–3.2)
GLUCOSE: 128 MG/DL (ref 74–100)
HCT VFR BLD CALC: 40.2 % (ref 37–47)
HGB BLD-MCNC: 14.1 G/DL (ref 12.2–16.2)
MACROCYTES BLD QL: (no result)
MANUAL DIFFERENTIAL: (no result)
MCHC RBC-ENTMCNC: 35.1 G/DL (ref 31.8–35.4)
MCV RBC: 102.6 FL (ref 81–99)
MEAN CORPUSCULAR HEMOGLOBIN: 36 PG (ref 27–31.2)
PLATELET # BLD: 350 K/MM3 (ref 142–424)
POTASSIUM: 3.3 MMOL/L (ref 3.5–5.1)
PROT SERPL-MCNC: 6.8 G/DL (ref 6.3–8.2)
RBC # BLD AUTO: 3.92 M/MM3 (ref 4.2–5.4)
SODIUM SPEC-SCNC: 139 MMOL/L (ref 136–145)
TSH SERPL-ACNC: 2.28 UIU/ML (ref 0.47–4.68)
VITAMIN B12: 805 PG/ML (ref 239–931)
WBC # BLD AUTO: 8.5 K/MM3 (ref 4.8–10.8)

## 2025-03-17 PROCEDURE — 85014 HEMATOCRIT: CPT

## 2025-03-17 PROCEDURE — 85007 BL SMEAR W/DIFF WBC COUNT: CPT

## 2025-03-17 PROCEDURE — 82607 VITAMIN B-12: CPT

## 2025-03-17 PROCEDURE — 80053 COMPREHEN METABOLIC PANEL: CPT

## 2025-03-17 PROCEDURE — 36415 COLL VENOUS BLD VENIPUNCTURE: CPT

## 2025-03-17 PROCEDURE — 85048 AUTOMATED LEUKOCYTE COUNT: CPT

## 2025-03-17 PROCEDURE — 85049 AUTOMATED PLATELET COUNT: CPT

## 2025-03-17 PROCEDURE — 85018 HEMOGLOBIN: CPT

## 2025-03-17 PROCEDURE — 82746 ASSAY OF FOLIC ACID SERUM: CPT

## 2025-03-17 PROCEDURE — 84443 ASSAY THYROID STIM HORMONE: CPT

## 2025-03-17 NOTE — TELEPHONE ENCOUNTER
----- Message from Sven Escamilla sent at 3/17/2025  3:14 PM EDT -----  Agree with holding beta-blockade 24 hours prior to tilt table.  ----- Message -----  From: Lakesha Friedman  Sent: 3/17/2025   2:57 PM EDT  To: Sven Escamilla III, MD

## 2025-03-17 NOTE — TELEPHONE ENCOUNTER
----- Message from Svne Escamilla sent at 3/17/2025  3:14 PM EDT -----  Agree with holding beta-blockade 24 hours prior to tilt table.  ----- Message -----  From: Lakesha Friedman  Sent: 3/17/2025   2:57 PM EDT  To: Sven Escamilla III, MD

## 2025-03-25 ENCOUNTER — HOSPITAL ENCOUNTER (OUTPATIENT)
Dept: HOSPITAL 22 - RAD | Age: 65
Discharge: HOME | End: 2025-03-25
Payer: COMMERCIAL

## 2025-03-25 DIAGNOSIS — Z87.891: Primary | ICD-10-CM

## 2025-03-25 PROCEDURE — 71271 CT THORAX LUNG CANCER SCR C-: CPT

## 2025-03-25 NOTE — CT_ITS
FINAL REPORT 
 
TECHNIQUE: 
Thin section axial images were obtained through the lungs using 
a low-dose technique per lung cancer screening protocol. 
Reconstruction images were obtained using the axial data.  Exam 
was performed using dose reduction technique.  This study was 
performed with techniques to keep radiation doses as low as 
reasonably achievable (ALARA).  Individualized dose reduction 
techniques using automated exposure control or adjustment of mA 
and/or kV according to the patient's size were employed. 
 
CLINICAL HISTORY: 
lung cancer screening  previous smoker , quit 14 years ago , 
previous 1 ppd x 32 years 
 
COMPARISON: 
None 
 
FINDINGS: 
CTDLvol: 2.9  DLP: 102.12  Former smoker, who quit 14 years ago 
32 pack year history  Lungs: No acute pulmonary abnormality. No 
suspicious nodules.  There is evidence of prior granulomatous 
disease.  Lymph nodes: There is a mildly enlarged right 
paratracheal node present, measuring 19 mm in size. 
Mediastinum: Heart size is normal.  Prominent coronary artery 
calcifications are present.  Pleura/pericardium: No pleural or 
pericardial effusion.  Other: No acute abnormality in the upper 
abdomen. 
 
IMPRESSION: 
No suspicious pulmonary nodule or mass.  Lung RADS: 1S, the S 
classification for prominent coronary artery calcifications 
Recommendation: 12-month follow-up LDCT 
 
Reviewed, Interpreted and Dictated by Kathi Talamantes MD 
Transcribed by Luz Elena Tinoco 
 
Authenticated and Electronically Signed by Kathi Talamantes MD on 
03/25/2025 02:15:55 PM [facility]

## 2025-04-01 ENCOUNTER — OFFICE VISIT (OUTPATIENT)
Dept: CARDIOLOGY | Facility: CLINIC | Age: 65
End: 2025-04-01
Payer: COMMERCIAL

## 2025-04-01 VITALS
OXYGEN SATURATION: 98 % | HEART RATE: 90 BPM | WEIGHT: 179 LBS | HEIGHT: 65 IN | DIASTOLIC BLOOD PRESSURE: 88 MMHG | BODY MASS INDEX: 29.82 KG/M2 | SYSTOLIC BLOOD PRESSURE: 122 MMHG

## 2025-04-01 DIAGNOSIS — I25.10 CORONARY ARTERY DISEASE INVOLVING NATIVE CORONARY ARTERY OF NATIVE HEART WITHOUT ANGINA PECTORIS: ICD-10-CM

## 2025-04-01 DIAGNOSIS — I10 PRIMARY HYPERTENSION: Primary | ICD-10-CM

## 2025-04-01 DIAGNOSIS — E78.00 HYPERCHOLESTEROLEMIA: ICD-10-CM

## 2025-04-01 PROCEDURE — 93000 ELECTROCARDIOGRAM COMPLETE: CPT | Performed by: INTERNAL MEDICINE

## 2025-04-01 PROCEDURE — 99214 OFFICE O/P EST MOD 30 MIN: CPT | Performed by: INTERNAL MEDICINE

## 2025-04-01 RX ORDER — DICLOFENAC SODIUM 75 MG/1
1 TABLET, DELAYED RELEASE ORAL AS NEEDED
COMMUNITY
Start: 2025-03-05

## 2025-04-01 NOTE — PROGRESS NOTES
[facility] at [facility]  Office visit  Bri Boyce  1960  [phone]    VISIT DATE:  4/1/2025      PCP: Romulo Costa, APRN  [address] [place] [id number]    CC:  Chief Complaint   Patient presents with    Coronary Artery Disease     Follow up    Shortness of Breath       PROBLEM LIST:  Non-obstructive CAD  Left  heart catheterization, October 2011, 40% blockage off the circumflex  and 30% blockage of the LAD.  40% blockage of the RCA.  Echocardiogram, 04/09/2015: EF 55% to 60% with no regional wall motion abnormalities.  Exercise Cardiolite, 04/09/2015: Normal exercise Cardiolite with decreased exercise capacity.  Hypertension   Hypercholesterolemia  Chest pain  Abnormal heart rhythm  Arthritis  Past surgeries:    Cyst removed from ovary.  Cyst removed from right breast.  Tubal ligation    Previous cardiac studies and procedures:  June 2020  2-week ambulatory ECG monitor  A relatively benign monitor study.  Shortness of air correlates with sinus rhythm. Palpitations correlate with PACs and short bursts of nonsustained atrial tachycardia.  Exercise myocardial perfusion imaging  Mild mild calcifications visualized in the mid LAD and proximal circumflex.  Pt denied any chest pain or discomfort, did report significant SOA (O2 sat 93-98%) during stress.  Expected exercise time: 7:05, actual time: 4:00  No ECG evidence of myocardial ischemia  Left ventricular ejection fraction is hyperdynamic (Calculated EF > 70%).  Myocardial perfusion imaging indicates a normal myocardial perfusion study with no evidence of ischemia.  Impressions are consistent with a low risk study.  Echo  Estimated EF appears to be in the range of 56 - 60%.  Left ventricular diastolic dysfunction (grade II) consistent with pseudonormalization.    December 2022  Carotid duplex    Proximal right internal carotid artery plaque without significant stenosis.    Right internal carotid artery stenosis of 0-49%.     Proximal left internal carotid artery plaque without significant stenosis.    Left internal carotid artery stenosis of 0-49%.    October 2023 exercise myocardial perfusion imaging    Patient denied chest pain/discomfort during exercise. Pt did c/o SOA (SP02 WNL on RA) that resolved in recovery.    Expected exercise duration = 6:45. Actual exercise duration = 4:25. Pt stopped due to SOA.    Equivocal ECG evidence of myocardial ischemia.    Mild three-vessel coronary calcifications.    Left ventricular ejection fraction is normal (Calculated EF = 69%).    Myocardial perfusion imaging indicates a normal myocardial perfusion study with no evidence of ischemia.    Impressions are consistent with a low risk study.    ASSESSMENT:   Diagnosis Plan   1. Primary hypertension        2. Hypercholesterolemia        3. Coronary artery disease involving native coronary artery of native heart without angina pectoris                PLAN:  Coronary artery disease, nonobstructive: Intermittent angina, potential vasospasm.  Continue amlodipine and as needed sublingual nitroglycerin.  Not recommending repeat cardiac catheterization at this time, most recent perfusion imaging reassuring.  Decreasing low-dose aspirin to every other day dosing due to easy bruising.    Hyperlipidemia: Goal LDL less than 100.  Continue atorvastatin 80 mg by mouth daily. annual lipid panel and CMP.  Encouraged regular exercise and dietary modification.    Hypertension: Goal less than 130/80 mmHg.  Currently well-controlled.  Continue current medical therapy, ACE inhibitor and thiazide diuretic.    Symptomatic PACs and PVCs: continue metoprolol tartrate 25 mg p.o. twice daily.    Diastolic dysfunction, grade 2: Currently euvolemic.  Continue current medical therapy.  Encouraged dietary modifications and regular exercise to achieve weight loss.  Improving functional capacity with regular exercise.      Subjective  Continues to lose weight after initiation of  "GLP-1 agonist.  Reporting generalized weakness in her arms and legs with intermittent falls.  Ongoing neurology evaluation.  No chest discomfort.  Persistent easy bruising with unremarkable evaluation by hematology.  Blood pressures running less than 130/80 mmHg.    PHYSICAL EXAMINATION:  Vitals:    04/01/25 0934   BP: 122/88   BP Location: Right arm   Patient Position: Sitting   Cuff Size: Adult   Pulse: 90   SpO2: 98%   Weight: 81.2 kg (179 lb)   Height: 165.1 cm (65\")         General Appearance:    Alert, cooperative, no distress, appears stated age   Head:    Normocephalic, without obvious abnormality, atraumatic   Eyes:    conjunctiva/corneas clear   Nose:   Nares normal, septum midline, mucosa normal, no drainage   Throat:   Lips, teeth and gums normal   Neck:   Supple, symmetrical, trachea midline, no carotid    bruit or JVD   Lungs:     Clear to auscultation bilaterally, respirations unlabored   Chest Wall:    No tenderness or deformity    Heart:    Regular rate and rhythm, S1 and S2 normal, no murmur, rub   or gallop, normal carotid impulse bilaterally without bruit.   Abdomen:     Soft, non-tender   Extremities:   Extremities normal, atraumatic, no cyanosis.  Trivial pretibial edema   Pulses:   2+ and symmetric all extremities   Skin:   Skin color, texture, turgor normal, no rashes or lesions       Diagnostic Data:    ECG 12 Lead    Date/Time: 4/1/2025 9:57 AM  Performed by: Sven Escamilla III, MD    Authorized by: Sven Escamilla III, MD  Comparison: compared with previous ECG from 8/22/2023  Similar to previous ECG  Rhythm: sinus rhythm  Other findings: low voltage    Clinical impression: non-specific ECG        Lab Results   Component Value Date    TRIG 131 11/23/2021    HDL 34 (L) 11/23/2021     Lab Results   Component Value Date    GLUCOSE 139 (H) 08/21/2023    BUN 11 08/21/2023    CREATININE 0.58 08/21/2023     08/21/2023    K 3.7 08/21/2023    CL 99 08/21/2023    CO2 26.0 08/21/2023     Lab " Results   Component Value Date    HGBA1C 5.80 (H) 08/25/2022     Lab Results   Component Value Date    WBC 14.42 (H) 03/14/2024    HGB 15.8 (H) 03/14/2024    HCT 46.6 (H) 03/14/2024     03/14/2024       Allergies  Allergies   Allergen Reactions    Percocet [Oxycodone-Acetaminophen] Nausea And Vomiting       Current Medications    Current Outpatient Medications:     albuterol sulfate  (90 Base) MCG/ACT inhaler, Inhale 2 puffs Every 4 (Four) Hours As Needed for Wheezing., Disp: , Rfl:     amLODIPine (NORVASC) 2.5 MG tablet, TAKE 1 TABLET BY MOUTH DAILY, Disp: 30 tablet, Rfl: 5    aspirin 81 MG EC tablet, Take 1 tablet by mouth Daily., Disp: 30 tablet, Rfl: 0    atorvastatin (LIPITOR) 80 MG tablet, Take 1 tablet by mouth Daily., Disp: , Rfl:     chlorthalidone (HYGROTON) 25 MG tablet, Take 1/2 (one-half) tablet by mouth once daily, Disp: 45 tablet, Rfl: 1    Diphenhydramine-APAP, sleep, (TYLENOL PM EXTRA STRENGTH PO), Take 1 tablet by mouth As Needed., Disp: , Rfl:     folic acid (FOLVITE) 400 MCG tablet, Take 1 tablet by mouth Daily., Disp: , Rfl:     gabapentin (NEURONTIN) 300 MG capsule, Take 1 capsule by mouth As Needed., Disp: , Rfl:     lisinopril (PRINIVIL,ZESTRIL) 5 MG tablet, Take 1 tablet by mouth Daily., Disp: , Rfl:     metoprolol tartrate (LOPRESSOR) 25 MG tablet, TAKE 1 TABLET BY MOUTH TWICE DAILY, Disp: 180 tablet, Rfl: 1    Mounjaro 7.5 MG/0.5ML solution auto-injector, Inject 7.5 mg as directed Every 7 (Seven) Days., Disp: , Rfl:     nitroglycerin (NITROSTAT) 0.4 MG SL tablet, Place 1 tablet under the tongue Every 5 (Five) Minutes As Needed for Chest Pain. Take no more than 3 doses in 15 minutes., Disp: 25 tablet, Rfl: 3    Premarin 0.625 MG/GM vaginal cream, As Needed., Disp: , Rfl:     Unable to find, 1 each 1 (One) Time. Med Name: c pap machine at night, Disp: , Rfl:     diclofenac (VOLTAREN) 75 MG EC tablet, Take 1 tablet by mouth As Needed. (Patient not taking: Reported on 4/1/2025),  Disp: , Rfl:           ROS  Review of Systems   Cardiovascular:  Negative for chest pain, claudication, dyspnea on exertion, irregular heartbeat, palpitations and syncope.   Respiratory:  Negative for cough, shortness of breath and wheezing.          SOCIAL HX  Social History     Socioeconomic History    Marital status:    Tobacco Use    Smoking status: Former     Current packs/day: 0.00     Average packs/day: 1 pack/day for 15.0 years (15.0 ttl pk-yrs)     Types: Cigarettes     Quit date: 2011     Years since quittin.1     Passive exposure: Past    Smokeless tobacco: Never   Vaping Use    Vaping status: Never Used   Substance and Sexual Activity    Alcohol use: No    Drug use: No    Sexual activity: Not Currently     Partners: Male     Birth control/protection: None       FAMILY HX  Family History   Problem Relation Age of Onset    Cancer Other     Heart disease Other     Diabetes Other     Arthritis Other     Stroke Mother             Diabetes Mother             Heart disease Father     Heart attack Father     Coronary artery disease Father             No Known Problems Sister     No Known Problems Brother     Cancer Maternal Grandmother     Arthritis Maternal Grandmother         Deseased    No Known Problems Sister     Cancer Maternal Grandfather     Diabetes Paternal Grandfather     Stroke Paternal Grandmother     COPD Paternal Aunt                      Sven Escamilla III, MD, FACC

## 2025-05-27 RX ORDER — CHLORTHALIDONE 25 MG/1
12.5 TABLET ORAL DAILY
Qty: 45 TABLET | Refills: 0 | Status: SHIPPED | OUTPATIENT
Start: 2025-05-27

## 2025-06-22 NOTE — PROGRESS NOTES
[facility]  Office visit  Bri Boyce  1960  [phone]    VISIT DATE:  6/23/2025      PCP:   Romulo Costa, APRN  [address] [place] [id number]    CC:  Chief Complaint   Patient presents with    Follow-up     6 month       PROBLEM LIST:  Non-obstructive CAD  Left  heart catheterization, October 2011, 40% blockage off the circumflex  and 30% blockage of the LAD.  40% blockage of the RCA.  Echocardiogram, 04/09/2015: EF 55% to 60% with no regional wall motion abnormalities.  Exercise Cardiolite, 04/09/2015: Normal exercise Cardiolite with decreased exercise capacity.  Hypertension  Hypercholesterolemia  T2DM  A1c 6.4  HARMONY with CPAP, not using since 1/2025  Arthritis  Past surgeries:   Cyst removed from ovary.  Cyst removed from right breast.  Tubal ligation    Previous cardiac studies and procedures:  June 2020  2-week ambulatory ECG monitor  A relatively benign monitor study.  Shortness of air correlates with sinus rhythm. Palpitations correlate with PACs and short bursts of nonsustained atrial tachycardia.  Exercise myocardial perfusion imaging  Mild mild calcifications visualized in the mid LAD and proximal circumflex.  Pt denied any chest pain or discomfort, did report significant SOA (O2 sat 93-98%) during stress.  Expected exercise time: 7:05, actual time: 4:00  No ECG evidence of myocardial ischemia  Left ventricular ejection fraction is hyperdynamic (Calculated EF > 70%).  Myocardial perfusion imaging indicates a normal myocardial perfusion study with no evidence of ischemia.  Impressions are consistent with a low risk study.  Echo  Estimated EF appears to be in the range of 56 - 60%.  Left ventricular diastolic dysfunction (grade II) consistent with pseudonormalization.    December 2022  Carotid duplex    Proximal right internal carotid artery plaque without significant stenosis.    Right internal carotid artery stenosis of 0-49%.    Proximal left  internal carotid artery plaque without significant stenosis.    Left internal carotid artery stenosis of 0-49%.    October 2023 exercise myocardial perfusion imaging    Patient denied chest pain/discomfort during exercise. Pt did c/o SOA (SP02 WNL on RA) that resolved in recovery.    Expected exercise duration = 6:45. Actual exercise duration = 4:25. Pt stopped due to SOA.    Equivocal ECG evidence of myocardial ischemia.    Mild three-vessel coronary calcifications.    Left ventricular ejection fraction is normal (Calculated EF = 69%).    Myocardial perfusion imaging indicates a normal myocardial perfusion study with no evidence of ischemia.    Impressions are consistent with a low risk study.    4/25/25  Tilt Table ([facility])  Mild symptoms of dizziness with significant drop in SBP of 55 mmHg from supine to standing. 30 bpm increase in HR from supine to standing.      ASSESSMENT:   Diagnosis Plan   1. Primary hypertension        2. Coronary artery disease involving native coronary artery of native heart without angina pectoris        3. Mixed hyperlipidemia              PLAN:  Coronary artery disease, nonobstructive: denies angina. Continue amlodipine and as needed sublingual nitroglycerin.  Not recommending repeat cardiac catheterization at this time, most recent perfusion imaging reassuring. Taking low-dose aspirin every other day due to easy bruising.    Hyperlipidemia: Goal LDL less than 100. LDL 64 March 2024. Continue atorvastatin 80 mg by mouth daily. annual lipid panel and CMP.  Encouraged regular exercise and dietary modification.    Hypertension: Goal less than 130/80 mmHg.  Currently well-controlled with orthostatic symptoms. We will discontinue chlorthalidone. Continue low dose amlodipine for anti-anginal effects. Continue lisinopril. She will continue to monitor home BP and notify our office if BP remains low normal with orthostatic symptoms.      Symptomatic PACs and PVCs: Continue metoprolol tartrate 25  "mg p.o. twice daily.    Diastolic dysfunction, grade 2: Currently euvolemic.  Continue current medical therapy.  Encouraged dietary modifications and regular exercise to achieve weight loss.  Improving functional capacity with regular exercise.    Obtain labs from PCP.    Subjective  Bri Boyce presents today for follow up after neuro testing completed. Tilt table test ordered by neurologist reported:  mild symptoms of dizziness with significant drop in SBP of 55 mmHg from supine to standing. 30 bpm increase in HR from supine to standing. She denies recurrent falls/injury. She has bilateral hand tremors and some residual dizziness. BP at target. She denies chest pain, unusual shortness of breath, orthopnea, PND, palpitations and presyncope/syncope. She would like to either stop amlodipine or chlorthalidone (both low doses) due to orthostatic symptoms.     PHYSICAL EXAMINATION:  Vitals:    06/23/25 1050   BP: 126/78   BP Location: Left arm   Patient Position: Sitting   Cuff Size: Adult   Pulse: 91   SpO2: 98%   Weight: 79.3 kg (174 lb 14.4 oz)   Height: 165.1 cm (65\")     Wt Readings from Last 3 Encounters:   06/23/25 79.3 kg (174 lb 14.4 oz)   04/01/25 81.2 kg (179 lb)   02/28/25 85.4 kg (188 lb 3.2 oz)      Vitals reviewed.   Constitutional:       Appearance: Normal and healthy appearance. Well-developed.   Pulmonary:      Effort: Pulmonary effort is normal.      Breath sounds: Normal breath sounds.   Cardiovascular:      Normal rate. Regular rhythm. Normal S1. Normal S2.       Murmurs: There is no murmur.      No gallop.  No rub.   Pulses:     Intact distal pulses.   Edema:     Peripheral edema absent.   Skin:     General: Skin is warm and dry.      Comments: Various bruises bilateral arms and recent dog bite wounds and scratches on right arm.    Neurological:      General: No focal deficit present.      Mental Status: Alert and oriented to person, place and time.      Motor: Tremor present.      " Comments: Bilateral hands     Psychiatric:         Behavior: Behavior is cooperative.        Diagnostic Data:  Procedures  Lab Results   Component Value Date    CHOL 148 11/23/2021    TRIG 131 11/23/2021    HDL 34 (L) 11/23/2021    LDL 90 11/23/2021        Lab Results   Component Value Date    GLUCOSE 139 (H) 08/21/2023    BUN 11 08/21/2023    CREATININE 0.58 08/21/2023     08/21/2023    K 3.7 08/21/2023    CL 99 08/21/2023    CO2 26.0 08/21/2023     Lab Results   Component Value Date    HGBA1C 5.80 (H) 08/25/2022     Lab Results   Component Value Date    WBC 14.42 (H) 03/14/2024    HGB 15.8 (H) 03/14/2024    HCT 46.6 (H) 03/14/2024     03/14/2024       Allergies  Allergies   Allergen Reactions    Percocet [Oxycodone-Acetaminophen] Nausea And Vomiting       Current Medications  Current Outpatient Medications   Medication Instructions    albuterol sulfate  (90 Base) MCG/ACT inhaler 2 puffs, Every 4 Hours PRN    amLODIPine (NORVASC) 2.5 mg, Oral, Daily    Amoxicillin-Pot Clavulanate (AUGMENTIN PO) 2 Times Daily    aspirin 81 mg, Oral, Daily    atorvastatin (LIPITOR) 80 mg, Daily    diclofenac (VOLTAREN) 75 MG EC tablet 1 tablet, As Needed    Diphenhydramine-APAP, sleep, (TYLENOL PM EXTRA STRENGTH PO) 1 tablet, As Needed    Doxepin HCl 3 MG tablet Nightly    folic acid (FOLVITE) 400 MCG tablet 1 tablet, Daily    gabapentin (NEURONTIN) 300 mg, As Needed    lisinopril (PRINIVIL,ZESTRIL) 5 mg, Daily    metoprolol tartrate (LOPRESSOR) 25 MG tablet TAKE 1 TABLET BY MOUTH TWICE DAILY    Mounjaro 7.5 mg, Every 7 Days    nitroglycerin (NITROSTAT) 0.4 mg, Sublingual, Every 5 Minutes PRN, Take no more than 3 doses in 15 minutes.    potassium chloride ER (K-TAB) 20 MEQ tablet controlled-release ER tablet 1 tablet, Daily    Premarin 0.625 MG/GM vaginal cream As Needed    Unable to find 1 each, Once        ROS  Review of Systems   Constitutional: Positive for weight loss.   Cardiovascular:  Negative for chest  pain, claudication, dyspnea on exertion, irregular heartbeat, palpitations and syncope.   Respiratory:  Negative for cough, shortness of breath and wheezing.    Neurological:  Positive for dizziness and tremors.       SOCIAL HX  Social History     Socioeconomic History    Marital status:    Tobacco Use    Smoking status: Former     Current packs/day: 0.00     Average packs/day: 1 pack/day for 15.0 years (15.0 ttl pk-yrs)     Types: Cigarettes     Quit date: 2011     Years since quittin.3     Passive exposure: Past    Smokeless tobacco: Never   Vaping Use    Vaping status: Never Used   Substance and Sexual Activity    Alcohol use: No    Drug use: No    Sexual activity: Not Currently     Partners: Male     Birth control/protection: None, Tubal ligation       FAMILY HX  Family History   Problem Relation Age of Onset    Cancer Other     Heart disease Other     Diabetes Other     Arthritis Other     Stroke Mother             Diabetes Mother             Heart disease Father     Heart attack Father     Coronary artery disease Father             No Known Problems Sister     No Known Problems Brother     Cancer Maternal Grandmother     Arthritis Maternal Grandmother         Deseased    No Known Problems Sister     Cancer Maternal Grandfather     Diabetes Paternal Grandfather     Stroke Paternal Grandmother     COPD Paternal Aunt                  Wen Bo, APRN

## 2025-06-23 ENCOUNTER — OFFICE VISIT (OUTPATIENT)
Dept: CARDIOLOGY | Facility: CLINIC | Age: 65
End: 2025-06-23
Payer: COMMERCIAL

## 2025-06-23 VITALS
OXYGEN SATURATION: 98 % | HEIGHT: 65 IN | HEART RATE: 91 BPM | BODY MASS INDEX: 29.14 KG/M2 | WEIGHT: 174.9 LBS | SYSTOLIC BLOOD PRESSURE: 126 MMHG | DIASTOLIC BLOOD PRESSURE: 78 MMHG

## 2025-06-23 DIAGNOSIS — E78.2 MIXED HYPERLIPIDEMIA: ICD-10-CM

## 2025-06-23 DIAGNOSIS — I10 PRIMARY HYPERTENSION: Primary | ICD-10-CM

## 2025-06-23 DIAGNOSIS — I25.10 CORONARY ARTERY DISEASE INVOLVING NATIVE CORONARY ARTERY OF NATIVE HEART WITHOUT ANGINA PECTORIS: ICD-10-CM

## 2025-06-23 PROCEDURE — 99214 OFFICE O/P EST MOD 30 MIN: CPT

## 2025-06-23 RX ORDER — DOXEPIN 3 MG/1
TABLET, FILM COATED ORAL NIGHTLY
COMMUNITY
Start: 2025-06-02

## 2025-06-23 RX ORDER — POTASSIUM CHLORIDE 1500 MG/1
1 TABLET, EXTENDED RELEASE ORAL DAILY
COMMUNITY
Start: 2025-04-25

## 2025-08-25 RX ORDER — CHLORTHALIDONE 25 MG/1
12.5 TABLET ORAL DAILY
Qty: 45 TABLET | Refills: 0 | OUTPATIENT
Start: 2025-08-25